# Patient Record
Sex: MALE | Race: WHITE | Employment: OTHER | URBAN - METROPOLITAN AREA
[De-identification: names, ages, dates, MRNs, and addresses within clinical notes are randomized per-mention and may not be internally consistent; named-entity substitution may affect disease eponyms.]

---

## 2018-01-19 ENCOUNTER — APPOINTMENT (OUTPATIENT)
Dept: GENERAL RADIOLOGY | Facility: HOSPITAL | Age: 58
End: 2018-01-19
Attending: EMERGENCY MEDICINE
Payer: MEDICARE

## 2018-01-19 ENCOUNTER — HOSPITAL ENCOUNTER (EMERGENCY)
Facility: HOSPITAL | Age: 58
Discharge: HOME | End: 2018-01-19
Attending: EMERGENCY MEDICINE
Payer: MEDICARE

## 2018-01-19 VITALS
HEIGHT: 70 IN | DIASTOLIC BLOOD PRESSURE: 72 MMHG | OXYGEN SATURATION: 92 % | RESPIRATION RATE: 18 BRPM | SYSTOLIC BLOOD PRESSURE: 117 MMHG | WEIGHT: 200 LBS | BODY MASS INDEX: 28.63 KG/M2 | HEART RATE: 50 BPM

## 2018-01-19 DIAGNOSIS — T42.4X1S: ICD-10-CM

## 2018-01-19 DIAGNOSIS — F41.9 ANXIETY: ICD-10-CM

## 2018-01-19 DIAGNOSIS — R52 WHOLE BODY PAIN: Primary | ICD-10-CM

## 2018-01-19 LAB
ALBUMIN SERPL-MCNC: 4 G/DL (ref 3.5–4.8)
ALP LIVER SERPL-CCNC: 48 U/L (ref 45–117)
ALT SERPL-CCNC: 22 U/L (ref 17–63)
AST SERPL-CCNC: 13 U/L (ref 15–41)
ATRIAL RATE: 55 BPM
BILIRUB SERPL-MCNC: 0.5 MG/DL (ref 0.1–2)
BUN BLD-MCNC: 32 MG/DL (ref 8–20)
CALCIUM BLD-MCNC: 9.2 MG/DL (ref 8.3–10.3)
CHLORIDE: 109 MMOL/L (ref 101–111)
CK: 106 IU/L (ref 39–308)
CO2: 30 MMOL/L (ref 22–32)
CREAT BLD-MCNC: 1.09 MG/DL (ref 0.7–1.3)
GLUCOSE BLD-MCNC: 87 MG/DL (ref 70–99)
M PROTEIN MFR SERPL ELPH: 7.4 G/DL (ref 6.1–8.3)
P AXIS: 50 DEGREES
P-R INTERVAL: 144 MS
POTASSIUM SERPL-SCNC: 4.2 MMOL/L (ref 3.6–5.1)
Q-T INTERVAL: 432 MS
QRS DURATION: 132 MS
QTC CALCULATION (BEZET): 413 MS
R AXIS: 32 DEGREES
SODIUM SERPL-SCNC: 144 MMOL/L (ref 136–144)
T AXIS: 5 DEGREES
TROPONIN: <0.046 NG/ML (ref ?–0.05)
VENTRICULAR RATE: 55 BPM

## 2018-01-19 PROCEDURE — 99285 EMERGENCY DEPT VISIT HI MDM: CPT

## 2018-01-19 PROCEDURE — 84484 ASSAY OF TROPONIN QUANT: CPT | Performed by: EMERGENCY MEDICINE

## 2018-01-19 PROCEDURE — 71046 X-RAY EXAM CHEST 2 VIEWS: CPT | Performed by: EMERGENCY MEDICINE

## 2018-01-19 PROCEDURE — 93010 ELECTROCARDIOGRAM REPORT: CPT

## 2018-01-19 PROCEDURE — 82550 ASSAY OF CK (CPK): CPT | Performed by: EMERGENCY MEDICINE

## 2018-01-19 PROCEDURE — 93005 ELECTROCARDIOGRAM TRACING: CPT

## 2018-01-19 PROCEDURE — 80053 COMPREHEN METABOLIC PANEL: CPT | Performed by: EMERGENCY MEDICINE

## 2018-01-19 PROCEDURE — 36415 COLL VENOUS BLD VENIPUNCTURE: CPT

## 2018-01-19 RX ORDER — ARIPIPRAZOLE 15 MG/1
15 TABLET ORAL DAILY
Status: ON HOLD | COMMUNITY
End: 2018-07-25

## 2018-01-19 RX ORDER — LISINOPRIL 2.5 MG/1
2.5 TABLET ORAL DAILY
Status: ON HOLD | COMMUNITY
End: 2018-07-25

## 2018-01-19 RX ORDER — CLONAZEPAM 2 MG/1
2 TABLET, ORALLY DISINTEGRATING ORAL 3 TIMES DAILY PRN
Status: ON HOLD | COMMUNITY
End: 2018-07-25

## 2018-01-19 NOTE — ED PROVIDER NOTES
Patient Seen in: [facility] Emergency Department    History   Patient presents with:  Dyspnea SALUD SOB (respiratory)    Stated Complaint: SOB, THIS MNG     HPI    Hx of anxiety  Supposed to only take 3 klonopin a day and has been taking [address] [facility] 90.7 kg   SpO2 92%   BMI 28.70 kg/m²         Physical Exam    Patient lying on an emergency department bed flat he has very flat facial affect.   His HEENT exam reveals mildly dry mucosa his neck is supple his lungs are clear to auscultation his heart has a clear.  No focal consolidation. Mediastinal and hilar contours are normal.         =====    CONCLUSION:  No focal consolidation is seen. If clinical symptoms persist     consider followup radiographs or CT.                    Dictated by: Catherine Cranker, Jeremiah Milo declined to give him more benzodiazepines or narcotics. Spoke with patient's primary care physician who wants the patient to be evaluated by psychiatry. I spoke with the patient about this.   He states that if his anxiety could be addressed by a psych

## 2018-01-19 NOTE — ED NOTES
While giving pt discharge instructions - pt states he doesn't feel [facility] is the best way to go. Pt states he will find his own psychiatrist - I recommended that he call the PMD and get a referral through is primary.

## 2018-01-19 NOTE — ED NOTES
MD ordered for pt to go to [facility] for assessment per pt request to receive help for his anxiety. RN called [facility] and was told if pt comes now he will wait a few hours to be seen or can make an appointment to been seen on Monday.   MD ok with pt being seen on Mo

## 2018-01-19 NOTE — ED INITIAL ASSESSMENT (HPI)
Couple hours ago pt started to feel anxious. Pt took extra Clonazepam, states he is suppose to take 3 a day and has been recently taking 6 tabs a day. Afterward started to have heaviness in chest and shortness of breath. Pt called 911.   Currently denies

## 2018-01-22 ENCOUNTER — HOSPITAL (OUTPATIENT)
Dept: OTHER | Age: 58
End: 2018-01-22
Attending: PSYCHIATRY & NEUROLOGY

## 2018-01-22 LAB
ALBUMIN SERPL-MCNC: 4.3 GM/DL (ref 3.6–5.1)
ALBUMIN/GLOB SERPL: 1.3 {RATIO} (ref 1–2.4)
ALP SERPL-CCNC: 39 UNIT/L (ref 45–117)
ALT SERPL-CCNC: 21 UNIT/L
AMPHETAMINES UR QL SCN>500 NG/ML: NEGATIVE
ANALYZER ANC (IANC): NORMAL
ANION GAP SERPL CALC-SCNC: 14 MMOL/L (ref 10–20)
APAP SERPL-MCNC: <2 MCG/ML (ref 10–30)
AST SERPL-CCNC: 24 UNIT/L
BARBITURATES UR QL SCN>200 NG/ML: NEGATIVE
BASOPHILS # BLD: 0.1 THOUSAND/MCL (ref 0–0.3)
BASOPHILS NFR BLD: 1 %
BENZODIAZ UR QL SCN>200 NG/ML: POSITIVE
BILIRUB SERPL-MCNC: 0.5 MG/DL (ref 0.2–1)
BUN SERPL-MCNC: 18 MG/DL (ref 6–20)
BUN/CREAT SERPL: 23 (ref 7–25)
BZE UR QL SCN>150 NG/ML: NEGATIVE
CALCIUM SERPL-MCNC: 9.7 MG/DL (ref 8.4–10.2)
CANNABINOIDS UR QL SCN>50 NG/ML: NEGATIVE
CHLORIDE: 106 MMOL/L (ref 98–107)
CO2 SERPL-SCNC: 26 MMOL/L (ref 21–32)
CREAT SERPL-MCNC: 0.8 MG/DL (ref 0.67–1.17)
DIFFERENTIAL METHOD BLD: NORMAL
EOSINOPHIL # BLD: 0.1 THOUSAND/MCL (ref 0.1–0.5)
EOSINOPHIL NFR BLD: 1 %
ERYTHROCYTE [DISTWIDTH] IN BLOOD: 13.3 % (ref 11–15)
ETHANOL SERPL-MCNC: NORMAL MG/DL
GLOBULIN SER-MCNC: 3.4 GM/DL (ref 2–4)
GLUCOSE SERPL-MCNC: 108 MG/DL (ref 65–99)
HEMATOCRIT: 45.7 % (ref 39–51)
HGB BLD-MCNC: 15.3 GM/DL (ref 13–17)
LIPASE SERPL-CCNC: 126 UNIT/L (ref 73–393)
LYMPHOCYTES # BLD: 3.6 THOUSAND/MCL (ref 1–4)
LYMPHOCYTES NFR BLD: 37 %
MAGNESIUM SERPL-MCNC: 2.1 MG/DL (ref 1.7–2.4)
MCH RBC QN AUTO: 31.4 PG (ref 26–34)
MCHC RBC AUTO-ENTMCNC: 33.5 GM/DL (ref 32–36.5)
MCV RBC AUTO: 93.8 FL (ref 78–100)
MONOCYTES # BLD: 0.7 THOUSAND/MCL (ref 0.3–0.9)
MONOCYTES NFR BLD: 7 %
NEUTROPHILS # BLD: 5.3 THOUSAND/MCL (ref 1.8–7.7)
NEUTROPHILS NFR BLD: 54 %
NEUTS SEG NFR BLD: NORMAL %
OPIATES UR QL SCN>300 NG/ML: NEGATIVE
PCP UR QL SCN>25 NG/ML: NEGATIVE
PERCENT NRBC: NORMAL
PLATELET # BLD: 222 THOUSAND/MCL (ref 140–450)
POTASSIUM SERPL-SCNC: 3.8 MMOL/L (ref 3.4–5.1)
PROT SERPL-MCNC: 7.7 GM/DL (ref 6.4–8.2)
RBC # BLD: 4.87 MILLION/MCL (ref 4.5–5.9)
SALICYLATES SERPL-MCNC: <2.8 MG/DL
SODIUM SERPL-SCNC: 142 MMOL/L (ref 135–145)
WBC # BLD: 9.8 THOUSAND/MCL (ref 4.2–11)

## 2018-01-23 LAB
AMORPH SED URNS QL MICRO: ABNORMAL
APPEARANCE UR: CLEAR
BILIRUB UR QL: NEGATIVE
CAOX CRY URNS QL MICRO: ABNORMAL
CHOLEST SERPL-MCNC: 155 MG/DL
CHOLEST/HDLC SERPL: 3.9 {RATIO}
COLOR UR: YELLOW
EPITH CASTS #/AREA URNS LPF: ABNORMAL /[LPF]
FATTY CASTS #/AREA URNS LPF: ABNORMAL /[LPF]
GLUCOSE UR-MCNC: NEGATIVE MG/DL
GLYCOHEMOGLOBIN: 5.4 % (ref 4.5–5.6)
GRAN CASTS #/AREA URNS LPF: ABNORMAL /[LPF]
HDLC SERPL-MCNC: 40 MG/DL
HGB UR QL: NEGATIVE
HYALINE CASTS #/AREA URNS LPF: ABNORMAL /[LPF]
KETONES UR-MCNC: NEGATIVE MG/DL
LDLC SERPL CALC-MCNC: 98 MG/DL
LEUKOCYTE ESTERASE UR QL STRIP: NEGATIVE
MICROSCOPIC (MT): ABNORMAL
MIXED CELL CASTS #/AREA URNS LPF: ABNORMAL /[LPF]
MUCOUS THREADS URNS QL MICRO: ABNORMAL
NITRITE UR QL: NEGATIVE
NONHDLC SERPL-MCNC: 115 MG/DL
PH UR: 6 UNIT (ref 5–7)
PROT UR QL: NEGATIVE MG/DL
RBC CASTS #/AREA URNS LPF: ABNORMAL /[LPF]
RENAL EPI CELLS #/AREA URNS HPF: ABNORMAL /[HPF]
SP GR UR: 1.02 (ref 1–1.03)
SPECIMEN SOURCE: ABNORMAL
SPERM URNS QL MICRO: ABNORMAL
T VAGINALIS URNS QL MICRO: ABNORMAL
T3 SERPL-MCNC: 1.01 NG/ML (ref 0.6–1.81)
T4 SERPL-MCNC: 10.9 MCG/DL (ref 4.7–13.3)
TRI-PHOS CRY URNS QL MICRO: ABNORMAL
TRIGLYCERIDE (TRIGP): 84 MG/DL
TSH SERPL-ACNC: 1.4 MCUNIT/ML (ref 0.35–5)
URATE CRY URNS QL MICRO: ABNORMAL
URNS CMNT MICRO: ABNORMAL
UROBILINOGEN UR QL: 4 MG/DL (ref 0–1)
WAXY CASTS #/AREA URNS LPF: ABNORMAL /[LPF]
WBC CASTS #/AREA URNS LPF: ABNORMAL /[LPF]
YEAST HYPHAE URNS QL MICRO: ABNORMAL
YEAST URNS QL MICRO: ABNORMAL

## 2018-01-29 ENCOUNTER — DIAGNOSTIC TRANS (OUTPATIENT)
Dept: OTHER | Age: 58
End: 2018-01-29

## 2018-01-31 LAB
ALBUMIN SERPL-MCNC: 4 GM/DL (ref 3.6–5.1)
ALBUMIN/GLOB SERPL: 1.2 {RATIO} (ref 1–2.4)
ALP SERPL-CCNC: 41 UNIT/L (ref 45–117)
ALT SERPL-CCNC: 15 UNIT/L
ANALYZER ANC (IANC): NORMAL
ANION GAP SERPL CALC-SCNC: 12 MMOL/L (ref 10–20)
AST SERPL-CCNC: 13 UNIT/L
BASOPHILS # BLD: 0 THOUSAND/MCL (ref 0–0.3)
BASOPHILS NFR BLD: 0 %
BILIRUB SERPL-MCNC: 0.5 MG/DL (ref 0.2–1)
BUN SERPL-MCNC: 19 MG/DL (ref 6–20)
BUN/CREAT SERPL: 22 (ref 7–25)
CALCIUM SERPL-MCNC: 9.3 MG/DL (ref 8.4–10.2)
CHLORIDE: 103 MMOL/L (ref 98–107)
CO2 SERPL-SCNC: 32 MMOL/L (ref 21–32)
CREAT SERPL-MCNC: 0.85 MG/DL (ref 0.67–1.17)
DIFFERENTIAL METHOD BLD: NORMAL
EOSINOPHIL # BLD: 0.2 THOUSAND/MCL (ref 0.1–0.5)
EOSINOPHIL NFR BLD: 4 %
ERYTHROCYTE [DISTWIDTH] IN BLOOD: 13.2 % (ref 11–15)
GLOBULIN SER-MCNC: 3.3 GM/DL (ref 2–4)
GLUCOSE SERPL-MCNC: 94 MG/DL (ref 65–99)
HEMATOCRIT: 44.5 % (ref 39–51)
HGB BLD-MCNC: 15 GM/DL (ref 13–17)
LYMPHOCYTES # BLD: 2 THOUSAND/MCL (ref 1–4)
LYMPHOCYTES NFR BLD: 44 %
MCH RBC QN AUTO: 31.6 PG (ref 26–34)
MCHC RBC AUTO-ENTMCNC: 33.7 GM/DL (ref 32–36.5)
MCV RBC AUTO: 93.9 FL (ref 78–100)
MONOCYTES # BLD: 0.5 THOUSAND/MCL (ref 0.3–0.9)
MONOCYTES NFR BLD: 11 %
NEUTROPHILS # BLD: 1.8 THOUSAND/MCL (ref 1.8–7.7)
NEUTROPHILS NFR BLD: 41 %
NEUTS SEG NFR BLD: NORMAL %
PERCENT NRBC: NORMAL
PLATELET # BLD: 192 THOUSAND/MCL (ref 140–450)
POTASSIUM SERPL-SCNC: 4.1 MMOL/L (ref 3.4–5.1)
PROT SERPL-MCNC: 7.3 GM/DL (ref 6.4–8.2)
RBC # BLD: 4.74 MILLION/MCL (ref 4.5–5.9)
SODIUM SERPL-SCNC: 143 MMOL/L (ref 135–145)
WBC # BLD: 4.5 THOUSAND/MCL (ref 4.2–11)

## 2018-03-03 ENCOUNTER — HOSPITAL ENCOUNTER (EMERGENCY)
Facility: HOSPITAL | Age: 58
Discharge: HOME | End: 2018-03-03
Payer: MEDICARE

## 2018-03-03 ENCOUNTER — APPOINTMENT (OUTPATIENT)
Dept: CT IMAGING | Facility: HOSPITAL | Age: 58
End: 2018-03-03
Payer: MEDICARE

## 2018-03-03 VITALS
WEIGHT: 195 LBS | OXYGEN SATURATION: 98 % | HEART RATE: 70 BPM | RESPIRATION RATE: 16 BRPM | DIASTOLIC BLOOD PRESSURE: 76 MMHG | BODY MASS INDEX: 27.92 KG/M2 | SYSTOLIC BLOOD PRESSURE: 102 MMHG | HEIGHT: 70 IN | TEMPERATURE: 97 F

## 2018-03-03 DIAGNOSIS — R10.9 ABDOMINAL PAIN OF UNKNOWN ETIOLOGY: Primary | ICD-10-CM

## 2018-03-03 DIAGNOSIS — R30.0 DYSURIA: ICD-10-CM

## 2018-03-03 LAB
ALBUMIN SERPL-MCNC: 4 G/DL (ref 3.5–4.8)
ALP LIVER SERPL-CCNC: 41 U/L (ref 45–117)
ALT SERPL-CCNC: 17 U/L (ref 17–63)
AST SERPL-CCNC: 24 U/L (ref 15–41)
BASOPHILS # BLD AUTO: 0.03 X10(3) UL (ref 0–0.1)
BASOPHILS NFR BLD AUTO: 0.6 %
BILIRUB SERPL-MCNC: 0.6 MG/DL (ref 0.1–2)
BUN BLD-MCNC: 20 MG/DL (ref 8–20)
CALCIUM BLD-MCNC: 9.7 MG/DL (ref 8.3–10.3)
CHLORIDE: 105 MMOL/L (ref 101–111)
CO2: 29 MMOL/L (ref 22–32)
CREAT BLD-MCNC: 0.91 MG/DL (ref 0.7–1.3)
EOSINOPHIL # BLD AUTO: 0.38 X10(3) UL (ref 0–0.3)
EOSINOPHIL NFR BLD AUTO: 7.2 %
ERYTHROCYTE [DISTWIDTH] IN BLOOD BY AUTOMATED COUNT: 13.1 % (ref 11.5–16)
GLUCOSE BLD-MCNC: 97 MG/DL (ref 70–99)
GLUCOSE UR STRIP.AUTO-MCNC: NEGATIVE MG/DL
HCT VFR BLD AUTO: 43.8 % (ref 37–53)
HGB BLD-MCNC: 14.5 G/DL (ref 13–17)
IMMATURE GRANULOCYTE COUNT: 0.01 X10(3) UL (ref 0–1)
IMMATURE GRANULOCYTE RATIO %: 0.2 %
LEUKOCYTE ESTERASE UR QL STRIP.AUTO: NEGATIVE
LYMPHOCYTES # BLD AUTO: 2.41 X10(3) UL (ref 0.9–4)
LYMPHOCYTES NFR BLD AUTO: 45.9 %
M PROTEIN MFR SERPL ELPH: 7 G/DL (ref 6.1–8.3)
MCH RBC QN AUTO: 30.9 PG (ref 27–33.2)
MCHC RBC AUTO-ENTMCNC: 33.1 G/DL (ref 31–37)
MCV RBC AUTO: 93.4 FL (ref 80–99)
MONOCYTES # BLD AUTO: 0.42 X10(3) UL (ref 0.1–1)
MONOCYTES NFR BLD AUTO: 8 %
NEUTROPHIL ABS PRELIM: 2 X10 (3) UL (ref 1.3–6.7)
NEUTROPHILS # BLD AUTO: 2 X10(3) UL (ref 1.3–6.7)
NEUTROPHILS NFR BLD AUTO: 38.1 %
NITRITE UR QL STRIP.AUTO: NEGATIVE
PH UR STRIP.AUTO: 5 [PH] (ref 4.5–8)
PLATELET # BLD AUTO: 170 10(3)UL (ref 150–450)
POTASSIUM SERPL-SCNC: 4.7 MMOL/L (ref 3.6–5.1)
PROT UR STRIP.AUTO-MCNC: 100 MG/DL
RBC # BLD AUTO: 4.69 X10(6)UL (ref 4.3–5.7)
RBC #/AREA URNS AUTO: >10 /HPF
RED CELL DISTRIBUTION WIDTH-SD: 44.2 FL (ref 35.1–46.3)
SODIUM SERPL-SCNC: 141 MMOL/L (ref 136–144)
SP GR UR STRIP.AUTO: 1.04 (ref 1–1.03)
UROBILINOGEN UR STRIP.AUTO-MCNC: 2 MG/DL
WBC # BLD AUTO: 5.3 X10(3) UL (ref 4–13)

## 2018-03-03 PROCEDURE — 87086 URINE CULTURE/COLONY COUNT: CPT

## 2018-03-03 PROCEDURE — 81001 URINALYSIS AUTO W/SCOPE: CPT

## 2018-03-03 PROCEDURE — 80053 COMPREHEN METABOLIC PANEL: CPT

## 2018-03-03 PROCEDURE — 74176 CT ABD & PELVIS W/O CONTRAST: CPT

## 2018-03-03 PROCEDURE — 85025 COMPLETE CBC W/AUTO DIFF WBC: CPT

## 2018-03-03 PROCEDURE — 99284 EMERGENCY DEPT VISIT MOD MDM: CPT

## 2018-03-03 PROCEDURE — 96361 HYDRATE IV INFUSION ADD-ON: CPT

## 2018-03-03 PROCEDURE — 96360 HYDRATION IV INFUSION INIT: CPT

## 2018-03-03 PROCEDURE — 51701 INSERT BLADDER CATHETER: CPT

## 2018-03-03 RX ORDER — TAMSULOSIN HYDROCHLORIDE 0.4 MG/1
0.4 CAPSULE ORAL DAILY
Qty: 7 CAPSULE | Refills: 0 | Status: SHIPPED | OUTPATIENT
Start: 2018-03-03 | End: 2018-03-10

## 2018-03-03 RX ORDER — SODIUM CHLORIDE 9 MG/ML
INJECTION, SOLUTION INTRAVENOUS CONTINUOUS
Status: DISCONTINUED | OUTPATIENT
Start: 2018-03-03 | End: 2018-03-03

## 2018-03-03 RX ORDER — SODIUM CHLORIDE 9 MG/ML
1000 INJECTION, SOLUTION INTRAVENOUS ONCE
Status: COMPLETED | OUTPATIENT
Start: 2018-03-03 | End: 2018-03-03

## 2018-03-03 RX ORDER — LIDOCAINE HYDROCHLORIDE 20 MG/ML
10 JELLY TOPICAL ONCE
Status: COMPLETED | OUTPATIENT
Start: 2018-03-03 | End: 2018-03-03

## 2018-03-03 NOTE — ED NOTES
Pt with suprapubic tenderness and lower abdominal distention. Pt straight cathed under aseptic technique. 30 ml of carmenza urine drained. Pt reports feeling worse with straight cath. Reports urge to urinate. Urinal provided.

## 2018-03-03 NOTE — ED PROVIDER NOTES
Patient Seen in: [facility] Emergency Department    History   Patient presents with:  Eval-G (gynecologic)    Stated Complaint: eval g    HPI    Pleasant 66-year-old male who presents to this emergency department in the middle the night with a compl Normocephalic and atraumatic. Sclera anicteric, conjunctiva pink and moist.    Mucus membranes pink and moist,   Neck: Supple with normal range of motion.    Chest: clear breath sounds      Abdomen: Soft, nondistended,  No tenderness, benign abdominal e intra-abdominal or intra-pelvic abnormality.    -No evidence of an obstructive uropathy. Small nonobstructing left renal calyceal stones.   Urinary bladder and prostate gland demonstrate a normal noncontrast appearance.    -Unremarkable appendix, and small and prescription treatment options and Physician follow up plan was discussed. The patient is discharged home in good condition.              Disposition and Plan     Clinical Impression:  Abdominal pain of unknown etiology  (primary encounter diagnosis)

## 2018-03-03 NOTE — ED INITIAL ASSESSMENT (HPI)
Patient has been treating for the past month for a swollen prostate. Patient sts unable to urinate since this afternoon. Pain in abd.  Denies n/v.

## 2018-07-25 ENCOUNTER — APPOINTMENT (OUTPATIENT)
Dept: GENERAL RADIOLOGY | Facility: HOSPITAL | Age: 58
DRG: 199 | End: 2018-07-25
Attending: EMERGENCY MEDICINE
Payer: MEDICARE

## 2018-07-25 ENCOUNTER — APPOINTMENT (OUTPATIENT)
Dept: GENERAL RADIOLOGY | Facility: HOSPITAL | Age: 58
DRG: 199 | End: 2018-07-25
Attending: INTERNAL MEDICINE
Payer: MEDICARE

## 2018-07-25 ENCOUNTER — HOSPITAL ENCOUNTER (INPATIENT)
Facility: HOSPITAL | Age: 58
LOS: 6 days | Discharge: HOME | DRG: 199 | End: 2018-07-31
Attending: EMERGENCY MEDICINE | Admitting: SPECIALIST
Payer: MEDICARE

## 2018-07-25 DIAGNOSIS — S27.0XXA TRAUMATIC PNEUMOTHORAX, INITIAL ENCOUNTER: Primary | ICD-10-CM

## 2018-07-25 LAB
ALBUMIN SERPL-MCNC: 3.9 G/DL (ref 3.5–4.8)
ALBUMIN/GLOB SERPL: 1.3 {RATIO} (ref 1–2)
ALP LIVER SERPL-CCNC: 53 U/L (ref 45–117)
ALT SERPL-CCNC: 35 U/L (ref 17–63)
AMPHET UR QL SCN: NEGATIVE
ANION GAP SERPL CALC-SCNC: 7 MMOL/L (ref 0–18)
AST SERPL-CCNC: 34 U/L (ref 15–41)
BARBITURATES UR QL SCN: NEGATIVE
BASOPHILS # BLD AUTO: 0.03 X10(3) UL (ref 0–0.1)
BASOPHILS NFR BLD AUTO: 0.3 %
BILIRUB SERPL-MCNC: 0.4 MG/DL (ref 0.1–2)
BUN BLD-MCNC: 28 MG/DL (ref 8–20)
BUN/CREAT SERPL: 32.6 (ref 10–20)
CALCIUM BLD-MCNC: 8.9 MG/DL (ref 8.3–10.3)
CANNABINOIDS UR QL SCN: NEGATIVE
CHLORIDE SERPL-SCNC: 105 MMOL/L (ref 101–111)
CO2 SERPL-SCNC: 29 MMOL/L (ref 22–32)
COCAINE UR QL: NEGATIVE
CREAT BLD-MCNC: 0.86 MG/DL (ref 0.7–1.3)
EOSINOPHIL # BLD AUTO: 0.18 X10(3) UL (ref 0–0.3)
EOSINOPHIL NFR BLD AUTO: 1.8 %
ERYTHROCYTE [DISTWIDTH] IN BLOOD BY AUTOMATED COUNT: 13.3 % (ref 11.5–16)
ETHANOL UR-MCNC: NEGATIVE MG/DL
ETHYL ALCOHOL: <3 MG/DL (ref ?–3)
GLOBULIN PLAS-MCNC: 3 G/DL (ref 2.5–3.7)
GLUCOSE BLD-MCNC: 99 MG/DL (ref 70–99)
HCT VFR BLD AUTO: 42.2 % (ref 37–53)
HGB BLD-MCNC: 14.2 G/DL (ref 13–17)
IMMATURE GRANULOCYTE COUNT: 0.04 X10(3) UL (ref 0–1)
IMMATURE GRANULOCYTE RATIO %: 0.4 %
LYMPHOCYTES # BLD AUTO: 1.76 X10(3) UL (ref 0.9–4)
LYMPHOCYTES NFR BLD AUTO: 17.9 %
M PROTEIN MFR SERPL ELPH: 6.9 G/DL (ref 6.1–8.3)
MCH RBC QN AUTO: 31.1 PG (ref 27–33.2)
MCHC RBC AUTO-ENTMCNC: 33.6 G/DL (ref 31–37)
MCV RBC AUTO: 92.5 FL (ref 80–99)
MONOCYTES # BLD AUTO: 0.77 X10(3) UL (ref 0.1–1)
MONOCYTES NFR BLD AUTO: 7.8 %
NEUTROPHIL ABS PRELIM: 7.03 X10 (3) UL (ref 1.3–6.7)
NEUTROPHILS # BLD AUTO: 7.03 X10(3) UL (ref 1.3–6.7)
NEUTROPHILS NFR BLD AUTO: 71.8 %
OSMOLALITY SERPL CALC.SUM OF ELEC: 298 MOSM/KG (ref 275–295)
PCP URINE: NEGATIVE
PLATELET # BLD AUTO: 180 10(3)UL (ref 150–450)
POTASSIUM SERPL-SCNC: 4.2 MMOL/L (ref 3.6–5.1)
RBC # BLD AUTO: 4.56 X10(6)UL (ref 4.3–5.7)
RED CELL DISTRIBUTION WIDTH-SD: 45.5 FL (ref 35.1–46.3)
SODIUM SERPL-SCNC: 141 MMOL/L (ref 136–144)
WBC # BLD AUTO: 9.8 X10(3) UL (ref 4–13)

## 2018-07-25 PROCEDURE — 71045 X-RAY EXAM CHEST 1 VIEW: CPT | Performed by: EMERGENCY MEDICINE

## 2018-07-25 PROCEDURE — 71045 X-RAY EXAM CHEST 1 VIEW: CPT | Performed by: INTERNAL MEDICINE

## 2018-07-25 PROCEDURE — 71101 X-RAY EXAM UNILAT RIBS/CHEST: CPT | Performed by: EMERGENCY MEDICINE

## 2018-07-25 PROCEDURE — 99223 1ST HOSP IP/OBS HIGH 75: CPT | Performed by: SURGERY

## 2018-07-25 PROCEDURE — 0W9930Z DRAINAGE OF RIGHT PLEURAL CAVITY WITH DRAINAGE DEVICE, PERCUTANEOUS APPROACH: ICD-10-PCS | Performed by: EMERGENCY MEDICINE

## 2018-07-25 RX ORDER — HYDROCODONE BITARTRATE AND ACETAMINOPHEN 10; 325 MG/1; MG/1
1 TABLET ORAL EVERY 6 HOURS PRN
COMMUNITY
End: 2020-06-20

## 2018-07-25 RX ORDER — DULOXETIN HYDROCHLORIDE 30 MG/1
60 CAPSULE, DELAYED RELEASE ORAL 2 TIMES DAILY
Status: DISCONTINUED | OUTPATIENT
Start: 2018-07-25 | End: 2018-07-31

## 2018-07-25 RX ORDER — MORPHINE SULFATE 4 MG/ML
INJECTION, SOLUTION INTRAMUSCULAR; INTRAVENOUS
Status: COMPLETED
Start: 2018-07-25 | End: 2018-07-25

## 2018-07-25 RX ORDER — SODIUM CHLORIDE 9 MG/ML
INJECTION, SOLUTION INTRAVENOUS CONTINUOUS
Status: DISCONTINUED | OUTPATIENT
Start: 2018-07-25 | End: 2018-07-29

## 2018-07-25 RX ORDER — AMITRIPTYLINE HYDROCHLORIDE 25 MG/1
25 TABLET, FILM COATED ORAL DAILY
COMMUNITY

## 2018-07-25 RX ORDER — DULOXETIN HYDROCHLORIDE 60 MG/1
60 CAPSULE, DELAYED RELEASE ORAL 2 TIMES DAILY
COMMUNITY

## 2018-07-25 RX ORDER — HYDROCODONE BITARTRATE AND ACETAMINOPHEN 5; 325 MG/1; MG/1
1 TABLET ORAL EVERY 6 HOURS PRN
Status: DISCONTINUED | OUTPATIENT
Start: 2018-07-25 | End: 2018-07-28

## 2018-07-25 RX ORDER — ARIPIPRAZOLE 5 MG/1
15 TABLET ORAL DAILY
Status: DISCONTINUED | OUTPATIENT
Start: 2018-07-25 | End: 2018-07-25

## 2018-07-25 RX ORDER — CLONAZEPAM 2 MG/1
2 TABLET ORAL 3 TIMES DAILY
Status: ON HOLD | COMMUNITY
End: 2018-07-30

## 2018-07-25 RX ORDER — ONDANSETRON 2 MG/ML
INJECTION INTRAMUSCULAR; INTRAVENOUS
Status: COMPLETED
Start: 2018-07-25 | End: 2018-07-25

## 2018-07-25 RX ORDER — AMITRIPTYLINE HYDROCHLORIDE 25 MG/1
25 TABLET, FILM COATED ORAL DAILY
Status: DISCONTINUED | OUTPATIENT
Start: 2018-07-25 | End: 2018-07-31

## 2018-07-25 RX ORDER — CLONAZEPAM 1 MG/1
2 TABLET ORAL 3 TIMES DAILY PRN
Status: DISCONTINUED | OUTPATIENT
Start: 2018-07-25 | End: 2018-07-25

## 2018-07-25 RX ORDER — CLONAZEPAM 0.5 MG/1
2 TABLET ORAL 3 TIMES DAILY
Status: DISCONTINUED | OUTPATIENT
Start: 2018-07-25 | End: 2018-07-27

## 2018-07-25 RX ORDER — IBUPROFEN 200 MG
400 TABLET ORAL NIGHTLY PRN
Status: ON HOLD | COMMUNITY
End: 2018-07-26

## 2018-07-25 RX ORDER — LISINOPRIL 2.5 MG/1
2.5 TABLET ORAL DAILY
Status: DISCONTINUED | OUTPATIENT
Start: 2018-07-25 | End: 2018-07-25

## 2018-07-25 RX ORDER — FENOFIBRATE 134 MG/1
134 CAPSULE ORAL
Status: DISCONTINUED | OUTPATIENT
Start: 2018-07-25 | End: 2018-07-31

## 2018-07-25 RX ORDER — FENOFIBRATE 160 MG/1
160 TABLET ORAL DAILY
COMMUNITY

## 2018-07-25 RX ORDER — MORPHINE SULFATE 4 MG/ML
4 INJECTION, SOLUTION INTRAMUSCULAR; INTRAVENOUS ONCE
Status: COMPLETED | OUTPATIENT
Start: 2018-07-25 | End: 2018-07-25

## 2018-07-25 RX ORDER — ENOXAPARIN SODIUM 100 MG/ML
40 INJECTION SUBCUTANEOUS DAILY
Status: DISCONTINUED | OUTPATIENT
Start: 2018-07-25 | End: 2018-07-31

## 2018-07-25 RX ORDER — PHENOL 1.4 %
10 AEROSOL, SPRAY (ML) MUCOUS MEMBRANE EVERY EVENING
COMMUNITY
End: 2020-06-20

## 2018-07-25 NOTE — ED PROVIDER NOTES
Patient Seen in: [facility] Emergency Department    History   Patient presents with:  Trauma (cardiovascular, musculoskeletal)    Stated Complaint: Jojo REINOSO    The patient is a 43-year-old male presenting to the [place] [address] %  O2 Device: None (Room air)    Current:/76   Pulse 77   Temp 97.5 °F (36.4 °C) (Temporal)   Resp 18   Ht 177.8 cm (5' 10\")   Wt 95.3 kg   SpO2 99%   BMI 30.13 kg/m²         Physical Exam  General: Well-appearing, nontoxic adult male who is convers ED Course     Labs Reviewed   COMP METABOLIC PANEL (14) - Abnormal; Notable for the following:        Result Value    BUN 28 (*)     BUN/CREA Ratio 32.6 (*)     Calculated Osmolality 298 (*)     All other components within normal limits   CBC W/ DIFFER was sterilized with chlorhexidine x 5 swabs and a sterile gown, sterile gloves, sterile drape were used in sterile fashion.   A linear incision was created with an #11 scalpel, blunt dissection was used to violate the pleural cavity immediately above the fi
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
## 2018-07-25 NOTE — CONSULTS
Pulmonary H&P/Consult       NAME: Wolf Bolden - ROOM: 9235/6598-E - MRN: [id number] - Age: 62year old - :  1960    Date of Admission: 2018  3:44 AM  Admission Diagnosis: Traumatic pneumothorax, initial encounter [S27. 0XXA]  Reason for [employer] Disp:  Rfl:  7/24/2018 at Unknown time   HYDROcodone-acetaminophen  MG Oral Tab Take 1 tablet by mouth every 6 (six) hours as needed for Pain. Disp:  Rfl:  Past Month at Unknown time   Amitriptyline HCl 25 MG Oral Tab Take 25 mg by mouth daily.  Disp: denies blurred vision or double vision   HEENT:  denies nasal congestion, sinus pain/tenderness  RESPIRATORY:  denies SOB, cough, dyspnea   CARDIOVASCULAR: no h/o arhythmia    GI:  denies abdominal pain  :  denies dysuria or changes in stream   MUSCULOSK in place + air leak   Heart:    Regular rate and rhythm, S1 and S2 normal, no murmur, rub   or gallop   Abdomen:     Soft, non-tender, bowel sounds active all four quadrants,     no masses, no organomegaly   Extremities:   Extremities normal, atraumatic, n

## 2018-07-25 NOTE — PROGRESS NOTES
Assumed care of patient at 0730  AOx4, 2LNC, NSR on tele  Reports controlled pain currently  Right side chest tube in place to wall suction, dressing C/D/I  Up with 1 assist  Admission database done, PTA meds reviewed with patient, patient not quite sure o
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
## 2018-07-25 NOTE — PROGRESS NOTES
Found patient sitting on side of bed, on his own. Has removed his gown, using urinal. Part of chest tube dressing off- patient did not call for assistance. Dr Marlene Aranda at bedside. CT dressing reapplied/reinforced.  Patient answering questions appropriatel

## 2018-07-25 NOTE — ED NOTES
Pt sitting uprt on cart, voiced feeling well, pt given telephone to talk to family. Pt updated with ED process awaiting transfer to floor.
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
## 2018-07-26 ENCOUNTER — APPOINTMENT (OUTPATIENT)
Dept: GENERAL RADIOLOGY | Facility: HOSPITAL | Age: 58
DRG: 199 | End: 2018-07-26
Attending: INTERNAL MEDICINE
Payer: MEDICARE

## 2018-07-26 PROCEDURE — 99232 SBSQ HOSP IP/OBS MODERATE 35: CPT | Performed by: SURGERY

## 2018-07-26 PROCEDURE — 71045 X-RAY EXAM CHEST 1 VIEW: CPT | Performed by: INTERNAL MEDICINE

## 2018-07-26 RX ORDER — ALFUZOSIN HYDROCHLORIDE 10 MG/1
10 TABLET, EXTENDED RELEASE ORAL
Status: DISCONTINUED | OUTPATIENT
Start: 2018-07-26 | End: 2018-07-31

## 2018-07-26 RX ORDER — ALFUZOSIN HYDROCHLORIDE 10 MG/1
10 TABLET, EXTENDED RELEASE ORAL
Status: DISCONTINUED | OUTPATIENT
Start: 2018-07-26 | End: 2018-07-26

## 2018-07-26 NOTE — PROGRESS NOTES
[facility]  Progress Note    Jacklyn Alfaro Patient Status:  Inpatient    1960 MRN [id number]   [facility] 7NE-A Attending Sahra Mckinney MD   Hosp Day # 1 PCP Gabriela Mijares MD         SUBJECTIVE:  Subjective:  Jacklyn Alfaro is chloride 0.9%  1,000 mL Intravenous Once   • enoxaparin  40 mg Subcutaneous Daily   • Amitriptyline HCl  25 mg Oral Daily   • ClonazePAM  2 mg Oral TID   • DULoxetine HCl  60 mg Oral BID   • fenofibrate micronized  134 mg Oral Daily with breakfast   • adán

## 2018-07-26 NOTE — PROGRESS NOTES
[facility]  Progress Note    Carmen Ying Patient Status:  Inpatient    1960 MRN [id number]   [facility] 7NE-A Attending Sheng Weiss MD   Hosp Day # 1 PCP Marta Araujo MD     Subjective:  Feels ok but sore.   No nausea or

## 2018-07-26 NOTE — PLAN OF CARE
Assumed care at 1100. Pt A&O x4. On 2L O2 via nc. Rt lung sounds diminished. CXR completed- see results. Rt chest tube to water seal- scant amounts of sanguineous drainage noted. NSR/ST on tele.  Pt reports pain to Rt chest and shoulder, states he generally

## 2018-07-26 NOTE — PLAN OF CARE
Assumed care at 1930  AOx4, VSS on 2L  NSR per tele  CT in place, Dressing C/D/I. IVF infusing. C/o of urinary urgency and unable to void. Bladder scan 646mL, Refusing straight cath. Able to void 200cc. Dr. Michale Lewis notified. Uroxatral ordered.     Via Contreras Castillo
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
## 2018-07-26 NOTE — CM/SW NOTE
07/26/18 1000   CM/SW Screening   Referral Source Social Work (self-referral)   [address] staff; Chart review;Nursing rounds     Patient was screened during rounds and no needs are identified at this time.   RN to contact SW/CM if needs arise

## 2018-07-27 ENCOUNTER — APPOINTMENT (OUTPATIENT)
Dept: CT IMAGING | Facility: HOSPITAL | Age: 58
DRG: 199 | End: 2018-07-27
Attending: INTERNAL MEDICINE
Payer: MEDICARE

## 2018-07-27 ENCOUNTER — APPOINTMENT (OUTPATIENT)
Dept: GENERAL RADIOLOGY | Facility: HOSPITAL | Age: 58
DRG: 199 | End: 2018-07-27
Attending: INTERNAL MEDICINE
Payer: MEDICARE

## 2018-07-27 PROCEDURE — 71045 X-RAY EXAM CHEST 1 VIEW: CPT | Performed by: INTERNAL MEDICINE

## 2018-07-27 PROCEDURE — 0W9930Z DRAINAGE OF RIGHT PLEURAL CAVITY WITH DRAINAGE DEVICE, PERCUTANEOUS APPROACH: ICD-10-PCS | Performed by: INTERNAL MEDICINE

## 2018-07-27 PROCEDURE — 70450 CT HEAD/BRAIN W/O DYE: CPT | Performed by: INTERNAL MEDICINE

## 2018-07-27 PROCEDURE — 99232 SBSQ HOSP IP/OBS MODERATE 35: CPT | Performed by: SURGERY

## 2018-07-27 RX ORDER — LIDOCAINE HYDROCHLORIDE 10 MG/ML
INJECTION, SOLUTION EPIDURAL; INFILTRATION; INTRACAUDAL; PERINEURAL
Status: DISCONTINUED
Start: 2018-07-27 | End: 2018-07-27 | Stop reason: WASHOUT

## 2018-07-27 RX ORDER — CLONAZEPAM 0.5 MG/1
1 TABLET ORAL 3 TIMES DAILY
Status: DISCONTINUED | OUTPATIENT
Start: 2018-07-27 | End: 2018-07-31

## 2018-07-27 RX ORDER — MORPHINE SULFATE 4 MG/ML
2 INJECTION, SOLUTION INTRAMUSCULAR; INTRAVENOUS ONCE
Status: COMPLETED | OUTPATIENT
Start: 2018-07-27 | End: 2018-07-27

## 2018-07-27 NOTE — PROGRESS NOTES
[facility]  Progress Note    Matthew Wood Patient Status:  Inpatient    1960 MRN [id number]   [facility] 7NE-A Attending Bruno Aragon MD   Hosp Day # 2 PCP Shankar Muñiz MD         SUBJECTIVE:  Subjective:  Matthew Wood is enoxaparin  40 mg Subcutaneous Daily   • Amitriptyline HCl  25 mg Oral Daily   • DULoxetine HCl  60 mg Oral BID   • fenofibrate micronized  134 mg Oral Daily with breakfast   • melatonin  10 mg Oral QPM     • sodium chloride 75 mL/hr at 07/27/18 0737     H

## 2018-07-27 NOTE — PROGRESS NOTES
[facility]  Progress Note    [facility] Patient Status:  Inpatient    1960 MRN [id number]   [facility] 7NE-A Attending Isai Deng MD   Hosp Day # 2 PCP Ai Caba MD     Subjective:  Sitting up, chest tube just remov

## 2018-07-27 NOTE — PROGRESS NOTES
[address] Patient Status:  Inpatient    1960 MRN [id number]   [facility] 7NE-A Attending Jeremias Major MD   Hosp Day # 2 PCP Jakub Lynn MD     SUBJECTIVE: No acute events overnight.   Pt complains of right non-tender, non-distended, positive BS.                         Extremity: No clubbing or cyanosis.  no edema                          Skin: No rashes or lesions        No results found for: PT, INR       Imaging: I have independently visualized all releva

## 2018-07-27 NOTE — PLAN OF CARE
Assumed care at 0730  A&Ox4, VSS, NSR on tele  Dr. Melina Lopez notified of critical CXR results. CT placed to suction as instructed  1530 - CT removed by Dr. Raphael Ochoa. Vaseline gauze and pressure dressing applied. Repeat CXR ordered for 1630.  Abigail JEFFRIES  C/O ri

## 2018-07-27 NOTE — PLAN OF CARE
Assumed care of pt at [address]. Pt A/Ox4. VSS. C/o of pain to right shoulder and chest and ribs 5/10, norco given. On 2L of O2 via NC. Rt lung sounds diminished. Right chest tube to water seal- scant amount of sanguineous drainage noted. NSR/ST on tele.  Voidin

## 2018-07-28 ENCOUNTER — APPOINTMENT (OUTPATIENT)
Dept: GENERAL RADIOLOGY | Facility: HOSPITAL | Age: 58
DRG: 199 | End: 2018-07-28
Attending: INTERNAL MEDICINE
Payer: MEDICARE

## 2018-07-28 PROCEDURE — 71045 X-RAY EXAM CHEST 1 VIEW: CPT | Performed by: INTERNAL MEDICINE

## 2018-07-28 RX ORDER — HYDROCODONE BITARTRATE AND ACETAMINOPHEN 5; 325 MG/1; MG/1
1 TABLET ORAL EVERY 4 HOURS PRN
Status: DISCONTINUED | OUTPATIENT
Start: 2018-07-28 | End: 2018-07-31
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
## 2018-07-28 NOTE — PROCEDURES
[address] Patient Status:  Inpatient    1960 MRN [id number]   [facility] 7NE-A Attending Daniel Jacobs MD   Hosp Day # 2 PCP Lisa Sabillon MD     Chest Tube Insertion    Indications:Pneumothorax    Anesthes

## 2018-07-28 NOTE — PLAN OF CARE
Assumed care at 0730  A&Ox4, VSS, NSR on tele  CT placed to water seal at 1400. Repeat CXR showing worsening pneumo. Dr. Andria Rachel notified  CT placed back to suction at 1730 as directed. Incision site redressed with vaseline gauze and tegaderm.  Plan for r

## 2018-07-28 NOTE — PLAN OF CARE
Assumed care at [address] [place] [address]. A&Ox4. VSS on RA. Right chest tube to suction. PRN Norco for pain. Voiding adequately. Resting in bed comfortably. Will continue to monitor.      DISCHARGE PLANNING    • Discharge to home or other facility with appropriate r

## 2018-07-28 NOTE — PROGRESS NOTES
[facility]  Progress Note    Nancy Simmons Patient Status:  Inpatient    1960 MRN [id number]   [facility] 7NE-A Attending Federica Lobo MD   Hosp Day # 3 PCP Dada Alberto MD         SUBJECTIVE:  Subjective:  Nancy Simmons is enoxaparin  40 mg Subcutaneous Daily   • Amitriptyline HCl  25 mg Oral Daily   • DULoxetine HCl  60 mg Oral BID   • fenofibrate micronized  134 mg Oral Daily with breakfast   • melatonin  10 mg Oral QPM     • sodium chloride Stopped (07/27/18 1100)     HYD

## 2018-07-28 NOTE — PROGRESS NOTES
Pulmonary Progress Note        NAME: Candance Jerry - ROOM: 8302/6068-L - MRN: [id number] - Age: 62year old - : 1960        SUBJECTIVE: No events overnight, feels ok this afternoon    OBJECTIVE:   18  0000 18  0445 18  0830  last 72 hours. Invalid input(s): ALPHOS, TBIL, DBIL, TPROT    Invalid input(s): ARTERIALPH, ARTERIALPO2, ARTERIALPCO2, ARTERIALHCO3    No results for input(s): BNP in the last 72 hours.     Invalid input(s): TROPI      Albumin   Date/Time Value Ref Range

## 2018-07-29 LAB
ANION GAP SERPL CALC-SCNC: 5 MMOL/L (ref 0–18)
BASOPHILS # BLD AUTO: 0.03 X10(3) UL (ref 0–0.1)
BASOPHILS NFR BLD AUTO: 0.6 %
BUN BLD-MCNC: 13 MG/DL (ref 8–20)
BUN/CREAT SERPL: 18.6 (ref 10–20)
CALCIUM BLD-MCNC: 9.3 MG/DL (ref 8.3–10.3)
CHLORIDE SERPL-SCNC: 102 MMOL/L (ref 101–111)
CO2 SERPL-SCNC: 31 MMOL/L (ref 22–32)
CREAT BLD-MCNC: 0.7 MG/DL (ref 0.7–1.3)
EOSINOPHIL # BLD AUTO: 0.32 X10(3) UL (ref 0–0.3)
EOSINOPHIL NFR BLD AUTO: 5.9 %
ERYTHROCYTE [DISTWIDTH] IN BLOOD BY AUTOMATED COUNT: 13.4 % (ref 11.5–16)
GLUCOSE BLD-MCNC: 91 MG/DL (ref 70–99)
HCT VFR BLD AUTO: 39.8 % (ref 37–53)
HGB BLD-MCNC: 13.1 G/DL (ref 13–17)
IMMATURE GRANULOCYTE COUNT: 0.02 X10(3) UL (ref 0–1)
IMMATURE GRANULOCYTE RATIO %: 0.4 %
LYMPHOCYTES # BLD AUTO: 1.88 X10(3) UL (ref 0.9–4)
LYMPHOCYTES NFR BLD AUTO: 34.7 %
MCH RBC QN AUTO: 30.5 PG (ref 27–33.2)
MCHC RBC AUTO-ENTMCNC: 32.9 G/DL (ref 31–37)
MCV RBC AUTO: 92.8 FL (ref 80–99)
MONOCYTES # BLD AUTO: 0.51 X10(3) UL (ref 0.1–1)
MONOCYTES NFR BLD AUTO: 9.4 %
NEUTROPHIL ABS PRELIM: 2.66 X10 (3) UL (ref 1.3–6.7)
NEUTROPHILS # BLD AUTO: 2.66 X10(3) UL (ref 1.3–6.7)
NEUTROPHILS NFR BLD AUTO: 49 %
OSMOLALITY SERPL CALC.SUM OF ELEC: 286 MOSM/KG (ref 275–295)
PLATELET # BLD AUTO: 190 10(3)UL (ref 150–450)
POTASSIUM SERPL-SCNC: 3.9 MMOL/L (ref 3.6–5.1)
RBC # BLD AUTO: 4.29 X10(6)UL (ref 4.3–5.7)
RED CELL DISTRIBUTION WIDTH-SD: 45.6 FL (ref 35.1–46.3)
SODIUM SERPL-SCNC: 138 MMOL/L (ref 136–144)
WBC # BLD AUTO: 5.4 X10(3) UL (ref 4–13)

## 2018-07-29 RX ORDER — DOCUSATE SODIUM 100 MG/1
100 CAPSULE, LIQUID FILLED ORAL 2 TIMES DAILY
Status: DISCONTINUED | OUTPATIENT
Start: 2018-07-29 | End: 2018-07-31

## 2018-07-29 RX ORDER — POLYETHYLENE GLYCOL 3350 17 G/17G
17 POWDER, FOR SOLUTION ORAL DAILY
Status: DISCONTINUED | OUTPATIENT
Start: 2018-07-29 | End: 2018-07-31

## 2018-07-29 RX ORDER — FLUTICASONE PROPIONATE 50 MCG
1 SPRAY, SUSPENSION (ML) NASAL DAILY
Status: DISCONTINUED | OUTPATIENT
Start: 2018-07-29 | End: 2018-07-31

## 2018-07-29 NOTE — PROGRESS NOTES
Pulmonary Progress Note        NAME: Mildred Vyas - ROOM: 2962/9122-A - MRN: [id number] - Age: 62year old - : 1960        SUBJECTIVE: no complaints this AM, denies chest pain/SOB    OBJECTIVE:   18  0005 18  0525 18  0844  72 hours. Invalid input(s): CREATININE    No results for input(s): ALT, AST, ALB, AMYLASE, LIPASE, LDH in the last 72 hours.     Invalid input(s): ALPHOS, TBIL, DBIL, TPROT    Invalid input(s): ARTERIALPH, ARTERIALPO2, ARTERIALPCO2, ARTERIALHCO3    No re

## 2018-07-29 NOTE — PROGRESS NOTES
[facility]  Progress Note    Jadiel Fang Patient Status:  Inpatient    1960 MRN [id number]   [facility] 7NE-A Attending Sunita Falk MD   [address] Day # 4 PCP Najma Smith MD         SUBJECTIVE:  Subjective:  Jadiel Fang is TID   • Alfuzosin HCl ER  10 mg Oral Daily with breakfast   • sodium chloride 0.9%  1,000 mL Intravenous Once   • enoxaparin  40 mg Subcutaneous Daily   • Amitriptyline HCl  25 mg Oral Daily   • DULoxetine HCl  60 mg Oral BID   • fenofibrate micronized  13

## 2018-07-29 NOTE — PLAN OF CARE
Assumed care at 0700. No acute issues today. Prn Norco for pain. A/ox 4  RA  NSR on tele. Right anterior CT to -20cm suction. No output today. Up walking in hallway w/ PCT, Richrd Duty. Vitals stable.      Repeat CXR in am.     DISCHARGE PLANNING    • Mikayla Ayala

## 2018-07-29 NOTE — PLAN OF CARE
Assumed care at [address] [facility]. A&Ox4. VSS on RA. NSR per tele. Norco given for pain with relief. Chest tube intact to suction. Resting in bed comfortably. Will continue to monitor.      DISCHARGE PLANNING    • Discharge to home or other facility with approp

## 2018-07-30 ENCOUNTER — APPOINTMENT (OUTPATIENT)
Dept: GENERAL RADIOLOGY | Facility: HOSPITAL | Age: 58
DRG: 199 | End: 2018-07-30
Attending: INTERNAL MEDICINE
Payer: MEDICARE

## 2018-07-30 PROCEDURE — 71045 X-RAY EXAM CHEST 1 VIEW: CPT | Performed by: INTERNAL MEDICINE

## 2018-07-30 RX ORDER — CLONAZEPAM 1 MG/1
1 TABLET ORAL 3 TIMES DAILY
Qty: 15 TABLET | Refills: 0 | Status: SHIPPED | OUTPATIENT
Start: 2018-07-30

## 2018-07-30 RX ORDER — ALFUZOSIN HYDROCHLORIDE 10 MG/1
10 TABLET, EXTENDED RELEASE ORAL
Qty: 30 TABLET | Refills: 0 | Status: SHIPPED | OUTPATIENT
Start: 2018-07-31 | End: 2020-06-20

## 2018-07-30 NOTE — PLAN OF CARE
Pt A/Ox4, on RA, NSR per tele  C/o Rt chest tube site pain, norco given with relief  Pt up with 1 assist, tolerating well  Rt chest tube in place, to water seal at 1000  PLAN: repeat CXR at 1200  Will cont to monitor     Addendum:  1445 Chest tube put back
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
## 2018-07-30 NOTE — PLAN OF CARE
Assumed care at [address] [facility]. A&Ox4. VSS on RA. NSR per tele. C/o pain to rt chest, PRN Norco w/ relief. CT to suction intact. Plan for repeat cxray today. Resting in bed comfortably. Will continue to monitor.     DISCHARGE PLANNING    • Discharge to mable

## 2018-07-30 NOTE — PROGRESS NOTES
[facility]  Progress Note    Karol Martinez Patient Status:  Inpatient    1960 MRN [id number]   [facility] 7NE-A Attending Gayathri Noyola MD   Hosp Day # 5 PCP Irene Fuchs MD         SUBJECTIVE:  Subjective:  Karol Martinez is sodium  100 mg Oral BID   • Fluticasone Propionate  1 spray Each Nare Daily   • ClonazePAM  1 mg Oral TID   • Alfuzosin HCl ER  10 mg Oral Daily with breakfast   • enoxaparin  40 mg Subcutaneous Daily   • Amitriptyline HCl  25 mg Oral Daily   • DULoxetine
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
## 2018-07-31 ENCOUNTER — APPOINTMENT (OUTPATIENT)
Dept: CT IMAGING | Facility: HOSPITAL | Age: 58
DRG: 199 | End: 2018-07-31
Attending: INTERNAL MEDICINE
Payer: MEDICARE

## 2018-07-31 ENCOUNTER — APPOINTMENT (OUTPATIENT)
Dept: GENERAL RADIOLOGY | Facility: HOSPITAL | Age: 58
DRG: 199 | End: 2018-07-31
Attending: INTERNAL MEDICINE
Payer: MEDICARE

## 2018-07-31 VITALS
HEIGHT: 70 IN | SYSTOLIC BLOOD PRESSURE: 117 MMHG | BODY MASS INDEX: 30.06 KG/M2 | WEIGHT: 210 LBS | RESPIRATION RATE: 18 BRPM | HEART RATE: 101 BPM | DIASTOLIC BLOOD PRESSURE: 80 MMHG | TEMPERATURE: 99 F | OXYGEN SATURATION: 94 %

## 2018-07-31 PROCEDURE — 71045 X-RAY EXAM CHEST 1 VIEW: CPT | Performed by: INTERNAL MEDICINE

## 2018-07-31 PROCEDURE — 71250 CT THORAX DX C-: CPT | Performed by: INTERNAL MEDICINE

## 2018-07-31 NOTE — PROGRESS NOTES
[facility]  Progress Note    Rachelle Arleth Patient Status:  Inpatient    1960 MRN [id number]   [facility] 7NE-A Attending Romario Kilpatrick MD   [facility] Day # 6 PCP Roby Barksdale MD         SUBJECTIVE:  Subjective:  Rachellecandice Reinoso is Fluticasone Propionate  1 spray Each Nare Daily   • ClonazePAM  1 mg Oral TID   • Alfuzosin HCl ER  10 mg Oral Daily with breakfast   • enoxaparin  40 mg Subcutaneous Daily   • Amitriptyline HCl  25 mg Oral Daily   • DULoxetine HCl  60 mg Oral BID   • feno

## 2018-07-31 NOTE — PROGRESS NOTES
THORACIC SURGERY PROGRESS NOTE  Hosea Dawson is a 62year old male. MRN [id number]. Admitted 2018    [address] [place] [address] EVENTS:     No acute events overnight. Pain at chest tube site. CT this morning.     VITALS:     Temp (24hrs), Av.3 °F (36.8 °C), Ngoc Vann  Thoracic Surgery  Pager: [phone]

## 2018-07-31 NOTE — PLAN OF CARE
Patient alert and oriented times four. Meds given per MAR. Vital signs stable. CT to suction. Up with 1 person standby to bathroom. Call light in reach. Resting comfortably in bed.      DISCHARGE PLANNING    • Discharge to home or other facility with appro
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
## 2018-07-31 NOTE — PLAN OF CARE
NURSING DISCHARGE NOTE    Discharged Home via Wheelchair. Accompanied by Support staff  Belongings Taken by patient/family. Discharge instructions given to patient, all questions and concerns answered.  Patient aware to get follow up chest xray in a

## 2018-08-01 NOTE — CM/SW NOTE
08/01/18 0900   Discharge disposition   Expected discharge disposition Home or Self   Name of [address] [place] [address] services after discharge None   Discharge transportation Private car

## 2018-08-07 NOTE — DISCHARGE SUMMARY
[facility]  Discharge Summary    Lemuel Hitchcock Patient Status:  Inpatient    1960 MRN [id number]   [facility] 7NE-A Attending No att. providers found   Hosp Day # 6 PCP Isabela Stokes MD     Date of Admission: 2018    Date o 138   K  4.2  3.9   CL  105  102   CO2  29.0  31.0   BUN  28*  13   CREATSERUM  0.86  0.70   CA  8.9  9.3   GLU  99  91             Recent Labs   Lab  07/25/18   0456   ALT  35   AST  34   ALB  3.9         No results for input(s): PGLU in the last 168 hour ct brain     8       NIDIA- pulm/ pcp to address after dc- he refuses cpap              See tests ordered,  Available and radiology reviewed  All consultant notes reviewed  Discussed with nursing on floor  dvt prophylaxis reviewed  PT and/or OT            Co

## 2019-04-30 ENCOUNTER — APPOINTMENT (OUTPATIENT)
Dept: CT IMAGING | Facility: HOSPITAL | Age: 59
End: 2019-04-30
Payer: MEDICARE

## 2019-04-30 PROCEDURE — 70486 CT MAXILLOFACIAL W/O DYE: CPT

## 2019-04-30 PROCEDURE — 70450 CT HEAD/BRAIN W/O DYE: CPT

## 2019-04-30 PROCEDURE — 76377 3D RENDER W/INTRP POSTPROCES: CPT

## 2019-04-30 PROCEDURE — 72125 CT NECK SPINE W/O DYE: CPT
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
## 2019-09-13 ENCOUNTER — HOSPITAL ENCOUNTER (OUTPATIENT)
Dept: GENERAL RADIOLOGY | Facility: HOSPITAL | Age: 59
Discharge: HOME | End: 2019-09-13
Attending: SPECIALIST
Payer: MEDICARE

## 2019-09-13 DIAGNOSIS — R05.9 COUGH: ICD-10-CM

## 2019-09-13 PROCEDURE — 71046 X-RAY EXAM CHEST 2 VIEWS: CPT | Performed by: SPECIALIST

## 2020-06-20 ENCOUNTER — APPOINTMENT (OUTPATIENT)
Dept: GENERAL RADIOLOGY | Facility: HOSPITAL | Age: 60
End: 2020-06-20
Attending: EMERGENCY MEDICINE
Payer: MEDICARE

## 2020-06-20 ENCOUNTER — HOSPITAL ENCOUNTER (EMERGENCY)
Facility: HOSPITAL | Age: 60
Discharge: HOME | End: 2020-06-20
Attending: EMERGENCY MEDICINE
Payer: MEDICARE

## 2020-06-20 ENCOUNTER — APPOINTMENT (OUTPATIENT)
Dept: CT IMAGING | Facility: HOSPITAL | Age: 60
End: 2020-06-20
Attending: EMERGENCY MEDICINE
Payer: MEDICARE

## 2020-06-20 VITALS
TEMPERATURE: 97 F | BODY MASS INDEX: 34.36 KG/M2 | SYSTOLIC BLOOD PRESSURE: 113 MMHG | DIASTOLIC BLOOD PRESSURE: 81 MMHG | HEIGHT: 70 IN | RESPIRATION RATE: 18 BRPM | WEIGHT: 240 LBS | OXYGEN SATURATION: 98 % | HEART RATE: 83 BPM

## 2020-06-20 DIAGNOSIS — S20.212A CONTUSION OF RIB ON LEFT SIDE, INITIAL ENCOUNTER: ICD-10-CM

## 2020-06-20 DIAGNOSIS — S63.91XA HAND SPRAIN, RIGHT, INITIAL ENCOUNTER: ICD-10-CM

## 2020-06-20 DIAGNOSIS — S09.8XXA BLUNT HEAD TRAUMA, INITIAL ENCOUNTER: Primary | ICD-10-CM

## 2020-06-20 PROCEDURE — 99284 EMERGENCY DEPT VISIT MOD MDM: CPT

## 2020-06-20 PROCEDURE — 99285 EMERGENCY DEPT VISIT HI MDM: CPT

## 2020-06-20 PROCEDURE — 70450 CT HEAD/BRAIN W/O DYE: CPT | Performed by: EMERGENCY MEDICINE

## 2020-06-20 PROCEDURE — 93010 ELECTROCARDIOGRAM REPORT: CPT

## 2020-06-20 PROCEDURE — 73130 X-RAY EXAM OF HAND: CPT | Performed by: EMERGENCY MEDICINE

## 2020-06-20 PROCEDURE — 71045 X-RAY EXAM CHEST 1 VIEW: CPT | Performed by: EMERGENCY MEDICINE

## 2020-06-20 PROCEDURE — 93005 ELECTROCARDIOGRAM TRACING: CPT

## 2020-06-20 RX ORDER — QUETIAPINE 50 MG/1
TABLET, FILM COATED ORAL
COMMUNITY

## 2020-06-21 NOTE — ED PROVIDER NOTES
Patient Seen in: [facility] Emergency Department      History   Patient presents with:  Fall    Stated Complaint: Fall    HPI    This is a 61-year-old male with past medical history of hypertension, obstructive sleep apnea, hyperlipidemia, anxiety, parietal scalp. Pupils equally round and reactive to light. Conjuctiva clear. Oropharynx is clear and moist.  No cervical spine tenderness. Lungs: Clear to auscultation bilaterally with no rales, no retractions, and no wheezing.   HEART:  Regular rate an Radiology Data Registry) which includes the Dose Index Registry. PATIENT STATED HISTORY: (As transcribed by Technologist)  Patient presents after a fall yesterday    FINDINGS:  VENTRICLES/SULCI:  Ventricles and sulci are normal in size.   INTRACRANIAL:  Th with primary Monday. Patient was discharged home in good condition.               Disposition and Plan     Clinical Impression:  Blunt head trauma, initial encounter  (primary encounter diagnosis)  Contusion of rib on left side, initial encounter  Hand spr

## 2021-05-31 ENCOUNTER — HOSPITAL ENCOUNTER (EMERGENCY)
Facility: HOSPITAL | Age: 61
Discharge: HOME | End: 2021-05-31
Attending: EMERGENCY MEDICINE
Payer: MEDICARE

## 2021-05-31 ENCOUNTER — APPOINTMENT (OUTPATIENT)
Dept: CT IMAGING | Facility: HOSPITAL | Age: 61
End: 2021-05-31
Attending: EMERGENCY MEDICINE
Payer: MEDICARE

## 2021-05-31 ENCOUNTER — APPOINTMENT (OUTPATIENT)
Dept: GENERAL RADIOLOGY | Facility: HOSPITAL | Age: 61
End: 2021-05-31
Attending: EMERGENCY MEDICINE
Payer: MEDICARE

## 2021-05-31 VITALS
RESPIRATION RATE: 13 BRPM | DIASTOLIC BLOOD PRESSURE: 92 MMHG | SYSTOLIC BLOOD PRESSURE: 131 MMHG | OXYGEN SATURATION: 100 % | HEART RATE: 87 BPM | TEMPERATURE: 98 F

## 2021-05-31 DIAGNOSIS — S82.141A CLOSED FRACTURE OF RIGHT TIBIAL PLATEAU, INITIAL ENCOUNTER: Primary | ICD-10-CM

## 2021-05-31 DIAGNOSIS — F10.920 ALCOHOLIC INTOXICATION WITHOUT COMPLICATION (HCC): ICD-10-CM

## 2021-05-31 DIAGNOSIS — T07.XXXA MULTIPLE ABRASIONS: ICD-10-CM

## 2021-05-31 PROCEDURE — 73502 X-RAY EXAM HIP UNI 2-3 VIEWS: CPT | Performed by: EMERGENCY MEDICINE

## 2021-05-31 PROCEDURE — 99284 EMERGENCY DEPT VISIT MOD MDM: CPT

## 2021-05-31 PROCEDURE — 36415 COLL VENOUS BLD VENIPUNCTURE: CPT

## 2021-05-31 PROCEDURE — 99285 EMERGENCY DEPT VISIT HI MDM: CPT

## 2021-05-31 PROCEDURE — 85025 COMPLETE CBC W/AUTO DIFF WBC: CPT | Performed by: EMERGENCY MEDICINE

## 2021-05-31 PROCEDURE — 72040 X-RAY EXAM NECK SPINE 2-3 VW: CPT | Performed by: EMERGENCY MEDICINE

## 2021-05-31 PROCEDURE — 70450 CT HEAD/BRAIN W/O DYE: CPT | Performed by: EMERGENCY MEDICINE

## 2021-05-31 PROCEDURE — 72125 CT NECK SPINE W/O DYE: CPT | Performed by: EMERGENCY MEDICINE

## 2021-05-31 PROCEDURE — 82077 ASSAY SPEC XCP UR&BREATH IA: CPT | Performed by: EMERGENCY MEDICINE

## 2021-05-31 PROCEDURE — 73080 X-RAY EXAM OF ELBOW: CPT | Performed by: EMERGENCY MEDICINE

## 2021-05-31 PROCEDURE — 80053 COMPREHEN METABOLIC PANEL: CPT | Performed by: EMERGENCY MEDICINE

## 2021-05-31 PROCEDURE — 73560 X-RAY EXAM OF KNEE 1 OR 2: CPT | Performed by: EMERGENCY MEDICINE

## 2021-05-31 RX ORDER — HYDROCODONE BITARTRATE AND ACETAMINOPHEN 5; 325 MG/1; MG/1
1 TABLET ORAL ONCE
Status: COMPLETED | OUTPATIENT
Start: 2021-05-31 | End: 2021-05-31

## 2021-05-31 RX ORDER — HYDROCODONE BITARTRATE AND ACETAMINOPHEN 5; 325 MG/1; MG/1
1-2 TABLET ORAL EVERY 6 HOURS PRN
Qty: 10 TABLET | Refills: 0 | Status: SHIPPED | OUTPATIENT
Start: 2021-05-31 | End: 2021-06-07
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
## 2021-06-02 PROCEDURE — 36415 COLL VENOUS BLD VENIPUNCTURE: CPT

## 2021-06-02 PROCEDURE — 99284 EMERGENCY DEPT VISIT MOD MDM: CPT

## 2021-06-03 ENCOUNTER — APPOINTMENT (OUTPATIENT)
Dept: GENERAL RADIOLOGY | Facility: HOSPITAL | Age: 61
End: 2021-06-03
Attending: EMERGENCY MEDICINE
Payer: MEDICARE

## 2021-06-03 ENCOUNTER — HOSPITAL ENCOUNTER (EMERGENCY)
Facility: HOSPITAL | Age: 61
Discharge: HOME | End: 2021-06-03
Attending: EMERGENCY MEDICINE
Payer: MEDICARE

## 2021-06-03 ENCOUNTER — APPOINTMENT (OUTPATIENT)
Dept: CT IMAGING | Facility: HOSPITAL | Age: 61
End: 2021-06-03
Attending: EMERGENCY MEDICINE
Payer: MEDICARE

## 2021-06-03 VITALS
WEIGHT: 250 LBS | TEMPERATURE: 97 F | RESPIRATION RATE: 20 BRPM | OXYGEN SATURATION: 94 % | HEART RATE: 98 BPM | SYSTOLIC BLOOD PRESSURE: 126 MMHG | HEIGHT: 70 IN | DIASTOLIC BLOOD PRESSURE: 77 MMHG | BODY MASS INDEX: 35.79 KG/M2

## 2021-06-03 DIAGNOSIS — S22.32XA CLOSED FRACTURE OF ONE RIB OF LEFT SIDE, INITIAL ENCOUNTER: Primary | ICD-10-CM

## 2021-06-03 PROCEDURE — 74177 CT ABD & PELVIS W/CONTRAST: CPT | Performed by: EMERGENCY MEDICINE

## 2021-06-03 PROCEDURE — 71260 CT THORAX DX C+: CPT | Performed by: EMERGENCY MEDICINE

## 2021-06-03 PROCEDURE — 80053 COMPREHEN METABOLIC PANEL: CPT | Performed by: EMERGENCY MEDICINE

## 2021-06-03 PROCEDURE — 85025 COMPLETE CBC W/AUTO DIFF WBC: CPT | Performed by: EMERGENCY MEDICINE

## 2021-06-03 PROCEDURE — 71045 X-RAY EXAM CHEST 1 VIEW: CPT | Performed by: EMERGENCY MEDICINE

## 2021-06-03 PROCEDURE — 82550 ASSAY OF CK (CPK): CPT | Performed by: EMERGENCY MEDICINE
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
## 2021-06-04 ENCOUNTER — TELEPHONE (OUTPATIENT)
Dept: ORTHOPEDICS CLINIC | Facility: CLINIC | Age: 61
End: 2021-06-04

## 2021-06-04 DIAGNOSIS — S82.141A CLOSED FRACTURE OF RIGHT TIBIAL PLATEAU, INITIAL ENCOUNTER: Primary | ICD-10-CM

## 2021-06-04 NOTE — TELEPHONE ENCOUNTER
OK to book for next Thursday  I put in an order for CT of the knee - patient should have this done prior to the appointment

## 2021-06-04 NOTE — TELEPHONE ENCOUNTER
Spoke with patient and notified him that Dr. Charline Gill would like him to have an appointment on Thursday, with CT scan done beforehand. Pt verbalized understanding. Appt time/date/location confirmed with patient. Pt verbalized understanding.  No further ques
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
## 2021-06-08 ENCOUNTER — HOSPITAL ENCOUNTER (OUTPATIENT)
Dept: CT IMAGING | Facility: HOSPITAL | Age: 61
Discharge: HOME | End: 2021-06-08
Attending: ORTHOPAEDIC SURGERY
Payer: MEDICARE

## 2021-06-08 DIAGNOSIS — S82.141A CLOSED FRACTURE OF RIGHT TIBIAL PLATEAU, INITIAL ENCOUNTER: ICD-10-CM

## 2021-06-08 PROCEDURE — 76377 3D RENDER W/INTRP POSTPROCES: CPT | Performed by: ORTHOPAEDIC SURGERY

## 2021-06-08 PROCEDURE — 73700 CT LOWER EXTREMITY W/O DYE: CPT | Performed by: ORTHOPAEDIC SURGERY

## 2021-06-10 ENCOUNTER — OFFICE VISIT (OUTPATIENT)
Dept: ORTHOPEDICS CLINIC | Facility: CLINIC | Age: 61
End: 2021-06-10
Payer: COMMERCIAL

## 2021-06-10 VITALS — OXYGEN SATURATION: 97 % | HEART RATE: 112 BPM

## 2021-06-10 DIAGNOSIS — S82.141A CLOSED FRACTURE OF RIGHT TIBIAL PLATEAU, INITIAL ENCOUNTER: Primary | ICD-10-CM

## 2021-06-10 PROCEDURE — L1833 KO ADJ JNT POS R SUP PRE OTS: HCPCS | Performed by: ORTHOPAEDIC SURGERY

## 2021-06-10 PROCEDURE — 27530 TREAT KNEE FRACTURE: CPT | Performed by: ORTHOPAEDIC SURGERY

## 2021-06-10 PROCEDURE — 99203 OFFICE O/P NEW LOW 30 MIN: CPT | Performed by: ORTHOPAEDIC SURGERY
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
## 2021-06-11 ENCOUNTER — TELEPHONE (OUTPATIENT)
Dept: ORTHOPEDICS CLINIC | Facility: CLINIC | Age: 61
End: 2021-06-11

## 2021-06-11 NOTE — TELEPHONE ENCOUNTER
Patient called again regarding message below.  Patient would like percocet to be placed in to [facility]     [phone]
Patient states Dr Reese Lozano was going to order Percocet for his right knee pain, after he was seen in the office yesterday. It has not been ordered. His [facility] [address] in [place] is on file. Patient would like a call once is it ordered.
Please advise     LOV: 6/10/21 Patient is asking for a stronger pain medication, he states he has used Percocet in the past.  We will refill this for him today. He will follow up in 1 month with x-rays of the knee.
order will be placed later tell him he can  tonight
7

## 2021-07-08 RX ORDER — OXYCODONE HYDROCHLORIDE AND ACETAMINOPHEN 5; 325 MG/1; MG/1
1 TABLET ORAL EVERY 8 HOURS PRN
Qty: 30 TABLET | Refills: 0 | Status: SHIPPED | OUTPATIENT
Start: 2021-07-08

## 2021-07-08 NOTE — TELEPHONE ENCOUNTER
LOV: 6/10/21  RTC: 1 mos  Filled:   Medication Quantity Refills Start End   oxyCODONE-acetaminophen (PERCOCET) 5-325 MG Oral Tab 30 tablet 0 6/11/2021    Sig:   Take 1-2 tablets by mouth every 6 (six) hours as needed for Pain.        Future Appointments   D

## 2021-07-14 ENCOUNTER — OFFICE VISIT (OUTPATIENT)
Dept: ORTHOPEDICS CLINIC | Facility: CLINIC | Age: 61
End: 2021-07-14
Payer: COMMERCIAL

## 2021-07-14 ENCOUNTER — HOSPITAL ENCOUNTER (OUTPATIENT)
Dept: GENERAL RADIOLOGY | Age: 61
Discharge: HOME | End: 2021-07-14
Attending: ORTHOPAEDIC SURGERY
Payer: MEDICARE

## 2021-07-14 VITALS — RESPIRATION RATE: 16 BRPM | BODY MASS INDEX: 36 KG/M2 | HEART RATE: 112 BPM | HEIGHT: 70 IN | OXYGEN SATURATION: 99 %

## 2021-07-14 DIAGNOSIS — S82.141D CLOSED FRACTURE OF RIGHT TIBIAL PLATEAU WITH ROUTINE HEALING, SUBSEQUENT ENCOUNTER: Primary | ICD-10-CM

## 2021-07-14 DIAGNOSIS — S82.141A CLOSED FRACTURE OF RIGHT TIBIAL PLATEAU, INITIAL ENCOUNTER: ICD-10-CM

## 2021-07-14 PROCEDURE — 73560 X-RAY EXAM OF KNEE 1 OR 2: CPT | Performed by: ORTHOPAEDIC SURGERY

## 2021-07-14 PROCEDURE — 99024 POSTOP FOLLOW-UP VISIT: CPT | Performed by: ORTHOPAEDIC SURGERY
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
## 2021-08-27 RX ORDER — OXYCODONE HYDROCHLORIDE AND ACETAMINOPHEN 5; 325 MG/1; MG/1
1 TABLET ORAL EVERY 8 HOURS PRN
Qty: 30 TABLET | Refills: 0 | OUTPATIENT
Start: 2021-08-27

## 2021-08-27 NOTE — TELEPHONE ENCOUNTER
LOV: 7/14/21  RTC: 6wks  Filled:    Quantity Refills Start End   oxyCODONE-acetaminophen 5-325 MG Oral Tab 30 tablet 0 7/8/2021    Sig:   Take 1 tablet by mouth every 8 (eight) hours as needed for Pain.        [place] Written Strength Quantity R

## 2021-09-01 NOTE — TELEPHONE ENCOUNTER
Patient states has been more active and thinks he may have over done it with the activity and he is now having pain at night. Pain level is 3/10 with OTC medications. Patient states hes tried Tylenol and Ibuprofen and this has not helped.  Patient has also

## 2021-09-03 NOTE — TELEPHONE ENCOUNTER
Attempted to reach patient, no answer, left message on identifying voicemail, to contact the office to discuss further

## 2021-09-29 ENCOUNTER — TELEPHONE (OUTPATIENT)
Dept: ORTHOPEDICS CLINIC | Facility: CLINIC | Age: 61
End: 2021-09-29

## 2021-09-29 ENCOUNTER — OFFICE VISIT (OUTPATIENT)
Dept: ORTHOPEDICS CLINIC | Facility: CLINIC | Age: 61
End: 2021-09-29
Payer: COMMERCIAL

## 2021-09-29 ENCOUNTER — HOSPITAL ENCOUNTER (OUTPATIENT)
Dept: GENERAL RADIOLOGY | Age: 61
Discharge: HOME | End: 2021-09-29
Attending: ORTHOPAEDIC SURGERY
Payer: MEDICARE

## 2021-09-29 VITALS — HEART RATE: 99 BPM | OXYGEN SATURATION: 95 %

## 2021-09-29 DIAGNOSIS — S82.141D CLOSED FRACTURE OF RIGHT TIBIAL PLATEAU WITH ROUTINE HEALING, SUBSEQUENT ENCOUNTER: ICD-10-CM

## 2021-09-29 DIAGNOSIS — S82.141D CLOSED FRACTURE OF RIGHT TIBIAL PLATEAU WITH ROUTINE HEALING, SUBSEQUENT ENCOUNTER: Primary | ICD-10-CM

## 2021-09-29 PROCEDURE — 73560 X-RAY EXAM OF KNEE 1 OR 2: CPT | Performed by: ORTHOPAEDIC SURGERY

## 2021-09-29 PROCEDURE — 99024 POSTOP FOLLOW-UP VISIT: CPT | Performed by: ORTHOPAEDIC SURGERY
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
## 2021-09-29 NOTE — TELEPHONE ENCOUNTER
Pt requesting medical records from June -July 14 , 2021. ER, discharge summary, H&P, consultations, CD and films etc.  Please mail to pt address on file. Faxed to [employer] [phone], confirmatiion received and copy sent to scanning.

## 2022-04-29 ENCOUNTER — HOSPITAL ENCOUNTER (OUTPATIENT)
Dept: GENERAL RADIOLOGY | Facility: HOSPITAL | Age: 62
Discharge: HOME | End: 2022-04-29
Attending: SPECIALIST
Payer: MEDICARE

## 2022-04-29 DIAGNOSIS — M54.13 RADICULOPATHY OF CERVICOTHORACIC REGION: ICD-10-CM

## 2022-04-29 PROCEDURE — 72050 X-RAY EXAM NECK SPINE 4/5VWS: CPT | Performed by: SPECIALIST

## 2022-06-02 ENCOUNTER — HOSPITAL ENCOUNTER (EMERGENCY)
Facility: HOSPITAL | Age: 62
Discharge: HOME | End: 2022-06-02
Attending: EMERGENCY MEDICINE
Payer: MEDICARE

## 2022-06-02 VITALS
DIASTOLIC BLOOD PRESSURE: 82 MMHG | HEART RATE: 87 BPM | RESPIRATION RATE: 16 BRPM | TEMPERATURE: 98 F | OXYGEN SATURATION: 98 % | SYSTOLIC BLOOD PRESSURE: 118 MMHG

## 2022-06-02 DIAGNOSIS — T24.231A PARTIAL THICKNESS BURN OF RIGHT LOWER LEG, INITIAL ENCOUNTER: Primary | ICD-10-CM

## 2022-06-02 PROCEDURE — 16020 DRESS/DEBRID P-THICK BURN S: CPT

## 2022-06-02 PROCEDURE — 99283 EMERGENCY DEPT VISIT LOW MDM: CPT

## 2022-06-02 RX ORDER — HYDROCODONE BITARTRATE AND ACETAMINOPHEN 5; 325 MG/1; MG/1
1 TABLET ORAL EVERY 6 HOURS PRN
Qty: 10 TABLET | Refills: 0 | Status: SHIPPED | OUTPATIENT
Start: 2022-06-02

## 2022-06-02 RX ORDER — IBUPROFEN 200 MG
400 TABLET ORAL ONCE
Status: COMPLETED | OUTPATIENT
Start: 2022-06-02 | End: 2022-06-02

## 2022-06-02 RX ORDER — HYDROCODONE BITARTRATE AND ACETAMINOPHEN 5; 325 MG/1; MG/1
1 TABLET ORAL ONCE
Status: COMPLETED | OUTPATIENT
Start: 2022-06-02 | End: 2022-06-02
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
## 2022-08-01 DIAGNOSIS — S82.091K: Primary | ICD-10-CM

## 2022-09-22 ENCOUNTER — HOSPITAL ENCOUNTER (OUTPATIENT)
Dept: GENERAL RADIOLOGY | Facility: HOSPITAL | Age: 62
Discharge: HOME | End: 2022-09-22
Attending: STUDENT IN AN ORGANIZED HEALTH CARE EDUCATION/TRAINING PROGRAM

## 2022-09-22 ENCOUNTER — HOSPITAL ENCOUNTER (OUTPATIENT)
Dept: MRI IMAGING | Facility: HOSPITAL | Age: 62
Discharge: HOME | End: 2022-09-22
Attending: STUDENT IN AN ORGANIZED HEALTH CARE EDUCATION/TRAINING PROGRAM

## 2022-09-22 DIAGNOSIS — S82.091K: ICD-10-CM

## 2022-09-22 DIAGNOSIS — M54.16 LUMBAR RADICULOPATHY: ICD-10-CM

## 2022-09-22 DIAGNOSIS — R42 DIZZINESS: ICD-10-CM

## 2022-09-22 PROCEDURE — 70551 MRI BRAIN STEM W/O DYE: CPT | Performed by: STUDENT IN AN ORGANIZED HEALTH CARE EDUCATION/TRAINING PROGRAM

## 2022-09-22 PROCEDURE — 73590 X-RAY EXAM OF LOWER LEG: CPT | Performed by: STUDENT IN AN ORGANIZED HEALTH CARE EDUCATION/TRAINING PROGRAM

## 2022-09-22 PROCEDURE — 72148 MRI LUMBAR SPINE W/O DYE: CPT | Performed by: STUDENT IN AN ORGANIZED HEALTH CARE EDUCATION/TRAINING PROGRAM

## 2022-09-22 PROCEDURE — 73562 X-RAY EXAM OF KNEE 3: CPT | Performed by: STUDENT IN AN ORGANIZED HEALTH CARE EDUCATION/TRAINING PROGRAM

## 2022-11-01 ENCOUNTER — EXTERNAL RECORD (OUTPATIENT)
Dept: OTHER | Age: 62
End: 2022-11-01

## 2022-12-07 ENCOUNTER — OFFICE VISIT (OUTPATIENT)
Dept: NEUROLOGY | Facility: CLINIC | Age: 62
End: 2022-12-07
Payer: COMMERCIAL

## 2022-12-07 VITALS
RESPIRATION RATE: 16 BRPM | HEART RATE: 80 BPM | DIASTOLIC BLOOD PRESSURE: 72 MMHG | BODY MASS INDEX: 31 KG/M2 | WEIGHT: 214 LBS | SYSTOLIC BLOOD PRESSURE: 128 MMHG

## 2022-12-07 DIAGNOSIS — R26.9 GAIT DISORDER: ICD-10-CM

## 2022-12-07 DIAGNOSIS — R42 DIZZINESS: Primary | ICD-10-CM

## 2022-12-07 DIAGNOSIS — G62.9 PERIPHERAL POLYNEUROPATHY: ICD-10-CM

## 2022-12-07 PROCEDURE — 99205 OFFICE O/P NEW HI 60 MIN: CPT | Performed by: OTHER

## 2022-12-07 PROCEDURE — 3078F DIAST BP <80 MM HG: CPT | Performed by: OTHER

## 2022-12-07 PROCEDURE — 3074F SYST BP LT 130 MM HG: CPT | Performed by: OTHER

## 2022-12-07 RX ORDER — MECOBAL/LEVOMEFOLAT CA/B6 PHOS 2-3-35 MG
1 TABLET ORAL DAILY
COMMUNITY
End: 2022-12-07

## 2022-12-07 RX ORDER — CLONAZEPAM 2 MG/1
1 TABLET ORAL 3 TIMES DAILY
COMMUNITY
Start: 2022-12-06

## 2022-12-07 RX ORDER — PRAVASTATIN SODIUM 20 MG
20 TABLET ORAL DAILY
COMMUNITY
Start: 2022-10-01

## 2022-12-07 RX ORDER — FOLIC ACID 1 MG/1
1 TABLET ORAL DAILY
COMMUNITY
Start: 2022-11-22

## 2022-12-07 RX ORDER — GABAPENTIN 300 MG/1
1 CAPSULE ORAL 4 TIMES DAILY
COMMUNITY
End: 2022-12-07

## 2022-12-07 RX ORDER — LOSARTAN POTASSIUM 50 MG/1
1 TABLET ORAL DAILY
COMMUNITY
Start: 2022-10-03

## 2022-12-07 RX ORDER — PREGABALIN 100 MG/1
100 CAPSULE ORAL 2 TIMES DAILY
COMMUNITY
Start: 2022-11-22

## 2022-12-07 RX ORDER — MULTIVITAMIN
TABLET ORAL
COMMUNITY

## 2022-12-07 RX ORDER — GAUZE BANDAGE 2" X 2"
1 BANDAGE TOPICAL DAILY
COMMUNITY
Start: 2022-07-26

## 2022-12-07 RX ORDER — ASPIRIN 81 MG/1
81 TABLET ORAL DAILY
COMMUNITY

## 2022-12-07 NOTE — PROGRESS NOTES
Patient states waking up with dizziness which is causing balance issues. Patient states nausea with dizziness. Patient fell last week due to the dizziness. Patient states last two falls included head trauma. Denies HA, however head pressure. Denies changes in speech. Patient states when turning his head to the right he begins to feel nauseous and a fainting sensation. Tingling in both shoulder blades. Denies vision changes. Patient states LBP, right side has numbness. Patient states burning and numbness sensation in feet. Patient states losing 60 pounds from October 2021 to May 2022.

## 2022-12-19 ENCOUNTER — HOSPITAL ENCOUNTER (EMERGENCY)
Facility: HOSPITAL | Age: 62
Discharge: HOME | End: 2022-12-20
Attending: EMERGENCY MEDICINE
Payer: MEDICARE

## 2022-12-19 ENCOUNTER — APPOINTMENT (OUTPATIENT)
Dept: GENERAL RADIOLOGY | Facility: HOSPITAL | Age: 62
End: 2022-12-19
Attending: EMERGENCY MEDICINE
Payer: MEDICARE

## 2022-12-19 DIAGNOSIS — S90.851A FOREIGN BODY IN RIGHT FOOT, INITIAL ENCOUNTER: Primary | ICD-10-CM

## 2022-12-19 PROCEDURE — 99283 EMERGENCY DEPT VISIT LOW MDM: CPT

## 2022-12-19 PROCEDURE — 73630 X-RAY EXAM OF FOOT: CPT | Performed by: EMERGENCY MEDICINE

## 2022-12-20 VITALS
OXYGEN SATURATION: 98 % | DIASTOLIC BLOOD PRESSURE: 86 MMHG | HEART RATE: 68 BPM | RESPIRATION RATE: 16 BRPM | SYSTOLIC BLOOD PRESSURE: 139 MMHG | TEMPERATURE: 99 F
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
## 2023-01-11 ENCOUNTER — TELEPHONE (OUTPATIENT)
Dept: ADMINISTRATIVE | Facility: HOSPITAL | Age: 63
End: 2023-01-11

## 2023-01-11 DIAGNOSIS — R42 VERTIGO: Primary | ICD-10-CM

## 2023-01-11 DIAGNOSIS — I20.8 STABLE ANGINA PECTORIS (HCC): Primary | ICD-10-CM

## 2023-01-11 NOTE — TELEPHONE ENCOUNTER
Good Afternoon, Please be advise that the [address] (ZRK=[id number]) was Denied by the Patient Health Plan. According to [facility] Dr. Romel Gonzalez can call and initiate a P2P to be done to see if the Denial can be overturned. All clinicals was uploaded. Patient is scheduled for tomorrow. [facility]#[id number] Tel#[phone] option#1    Thanks,  Arvid Blunt    Denial Reason:    Urgent Action Needed: Information needed in order to approve request for member   Agustin Carrion. Respond Before 1/19/2023. This notice is not a denial of coverage notice. This notice is an opportunity to provide   additional information, not previously provided, or to engage in a Xpcz-qy-Ujqv discussion,   prior to issuing a denial decision. Date: 1/10/2023 Member number: [id number]  Beneficiary's name: Agustin Carrion  This fax is to inform you that our Medical Director has reviewed your request for Pro Zhou, procedure code  Procedure Description Units   Requested  63539 Computed Tomography Angiography (CTA), a special kind   of picture of the blood vessels in your head without and   with contrast (dye)  1  28882 Computed Tomography Angiography (CTA), a special kind   of picture of the blood vessels in your neck, without and   with contrast (dye)  1  If we do not receive additional information, or a request for a Rffq-mt-Hudu discussion, to   support an approval by the date specified, the information below will be used in making a   determination. This information contains the reason why the request does not currently   meet medical necessity, and the information needed to support an approval:  For 42123 Computed Tomography Angiography (CTA), a special kind of picture of the   blood vessels in your head without and with contrast (dye) Based on evi[employer] Head   Imaging Guidelines Section(s): Dizziness/Vertigo (HD 23.1), we cannot approve this   request. Your healthcare provider told us that you have dizzy spells.  The request cannot   be approved because: You must have one of the following signs or symptoms suggestive of decreased blood   flow to the back or your brain.   -Vertigo (a sensation of feeling off balance) associated with nausea and vomiting.   -Diplopia (double vision). -Visual loss. -Dysarthria (weakness or difficulty controlling the muscles you use for speech). -Associated weakness. -Ataxia (a lack of muscle coordination and control). -Drop attack (fainting spell). For 57243 Computed Tomography Angiography (CTA), a special kind of picture of the   blood vessels in your neck, without and with contrast (dye) Based on eviCore Head   Imaging Guidelines Section(s): Dizziness/Vertigo (HD 23.1), we cannot approve this   request. Your healthcare provider told us that you have dizzy spells. The request cannot   be approved because: You must have one of the following signs or symptoms suggestive of decreased blood   flow to the back or your brain.   -Vertigo (a sensation of feeling off balance) associated with nausea and vomiting. Diplopia (double vision). -Visual loss. -Dysarthria (weakness or difficulty controlling the muscles you use for speech). -Associated weakness. -Ataxia (a lack of muscle coordination and control). -Drop attack (fainting spell). Please provide the requested clinical information Before 1/19/2023. Please call [phone],   select the prompt associated with the request type and enter in reference number [id number] to   speak with a clinician about this request. You may fax additional clinical information to support   this request to [phone] for review. You may also schedule a Puwy-zm-Pcss discussion regarding the case. You may schedule a   discussion with a Medical Director by calling [phone], select the prompt associated with   the request type and enter in reference number [id number].  You also have the opportunity to   schedule a Peer to Peer discussion online at [email or web address]. Search for the   case by using the Authorization Lookup tab, and then go to P2P Availability, to schedule the   discussion.

## 2023-01-11 NOTE — TELEPHONE ENCOUNTER
Denial d/t   You must have one of the following signs or symptoms suggestive of decreased blood   flow to the back or your brain.   -Vertigo (a sensation of feeling off balance) associated with nausea and vomiting.   -Diplopia (double vision). -Visual loss. -Dysarthria (weakness or difficulty controlling the muscles you use for speech). -Associated weakness. -Ataxia (a lack of muscle coordination and control). -Drop attack (fainting spell).

## 2023-01-11 NOTE — TELEPHONE ENCOUNTER
Spoke with Nelia in the clinical review department and gave her new Dx Vertigo (R42) and that Dr Gustavo Galindo ordered the CTA to r/o vascular narrowing/stenosis. Nelia states she will give the updated information to the clinical director as Urgent. Left message on patient's VM (ok per HIPAA consent) with above information. Bhanu Abebe in radiology also notified of above.

## 2023-01-12 ENCOUNTER — LAB ENCOUNTER (OUTPATIENT)
Dept: LAB | Facility: HOSPITAL | Age: 63
End: 2023-01-12
Attending: Other
Payer: MEDICARE

## 2023-01-12 ENCOUNTER — HOSPITAL ENCOUNTER (OUTPATIENT)
Dept: CT IMAGING | Facility: HOSPITAL | Age: 63
Discharge: HOME | End: 2023-01-12
Attending: Other
Payer: MEDICARE

## 2023-01-12 DIAGNOSIS — R42 DIZZINESS: ICD-10-CM

## 2023-01-12 DIAGNOSIS — G62.9 PERIPHERAL NERVE DISORDER: Primary | ICD-10-CM

## 2023-01-12 DIAGNOSIS — G62.9 PERIPHERAL POLYNEUROPATHY: ICD-10-CM

## 2023-01-12 LAB
CREAT BLD-MCNC: 0.7 MG/DL
ERYTHROCYTE [SEDIMENTATION RATE] IN BLOOD: 5 MM/HR
FOLATE SERPL-MCNC: 43.9 NG/ML (ref 8.7–?)
GFR SERPLBLD BASED ON 1.73 SQ M-ARVRAT: 104 ML/MIN/1.73M2 (ref 60–?)
HBV SURFACE AG SER-ACNC: <0.1 [IU]/L
HBV SURFACE AG SERPL QL IA: NONREACTIVE
IGA SERPL-MCNC: 85.8 MG/DL (ref 70–312)
IGM SERPL-MCNC: 73.1 MG/DL (ref 43–279)
IMMUNOGLOBULIN PNL SER-MCNC: 949 MG/DL (ref 791–1643)
RHEUMATOID FACT SERPL-ACNC: <10 IU/ML (ref ?–15)
VIT B12 SERPL-MCNC: 669 PG/ML (ref 193–986)

## 2023-01-12 PROCEDURE — 84165 PROTEIN E-PHORESIS SERUM: CPT

## 2023-01-12 PROCEDURE — 36415 COLL VENOUS BLD VENIPUNCTURE: CPT

## 2023-01-12 PROCEDURE — 86431 RHEUMATOID FACTOR QUANT: CPT

## 2023-01-12 PROCEDURE — 86334 IMMUNOFIX E-PHORESIS SERUM: CPT

## 2023-01-12 PROCEDURE — 84425 ASSAY OF VITAMIN B-1: CPT

## 2023-01-12 PROCEDURE — 87340 HEPATITIS B SURFACE AG IA: CPT

## 2023-01-12 PROCEDURE — 82784 ASSAY IGA/IGD/IGG/IGM EACH: CPT

## 2023-01-12 PROCEDURE — 83521 IG LIGHT CHAINS FREE EACH: CPT

## 2023-01-12 PROCEDURE — 70498 CT ANGIOGRAPHY NECK: CPT | Performed by: OTHER

## 2023-01-12 PROCEDURE — 70496 CT ANGIOGRAPHY HEAD: CPT | Performed by: OTHER

## 2023-01-12 PROCEDURE — 82746 ASSAY OF FOLIC ACID SERUM: CPT

## 2023-01-12 PROCEDURE — 82565 ASSAY OF CREATININE: CPT

## 2023-01-12 PROCEDURE — 85652 RBC SED RATE AUTOMATED: CPT | Performed by: OTHER

## 2023-01-12 PROCEDURE — 83921 ORGANIC ACID SINGLE QUANT: CPT

## 2023-01-12 PROCEDURE — 82607 VITAMIN B-12: CPT

## 2023-01-16 LAB
KAPPA LC FREE SER-MCNC: 1.89 MG/DL (ref 0.33–1.94)
KAPPA LC FREE/LAMBDA FREE SER NEPH: 1.7 {RATIO} (ref 0.26–1.65)
LAMBDA LC FREE SERPL-MCNC: 1.11 MG/DL (ref 0.57–2.63)
MMA: 0.18 UMOL/L

## 2023-01-17 LAB
ALBUMIN SERPL ELPH-MCNC: 4.13 G/DL (ref 3.75–5.21)
ALBUMIN/GLOB SERPL: 1.49 {RATIO} (ref 1–2)
ALPHA1 GLOB SERPL ELPH-MCNC: 0.26 G/DL (ref 0.19–0.46)
ALPHA2 GLOB SERPL ELPH-MCNC: 1.08 G/DL (ref 0.48–1.05)
B-GLOBULIN SERPL ELPH-MCNC: 0.55 G/DL (ref 0.68–1.23)
GAMMA GLOB SERPL ELPH-MCNC: 0.88 G/DL (ref 0.62–1.7)
PROT SERPL-MCNC: 6.9 G/DL (ref 6.4–8.2)

## 2023-01-18 LAB — VITAMIN B1 (THIAMINE), WHOLE B: 158 NMOL/L

## 2023-01-30 ENCOUNTER — HOSPITAL ENCOUNTER (OUTPATIENT)
Dept: ULTRASOUND IMAGING | Age: 63
Discharge: HOME | End: 2023-01-30
Attending: SURGERY
Payer: MEDICARE

## 2023-01-30 DIAGNOSIS — I65.21 STENOSIS OF RIGHT CAROTID ARTERY: ICD-10-CM

## 2023-01-30 PROCEDURE — 93880 EXTRACRANIAL BILAT STUDY: CPT | Performed by: SURGERY

## 2023-03-06 ENCOUNTER — OFFICE VISIT (OUTPATIENT)
Dept: CARDIOLOGY | Age: 63
End: 2023-03-06

## 2023-03-06 VITALS
WEIGHT: 207.12 LBS | SYSTOLIC BLOOD PRESSURE: 120 MMHG | OXYGEN SATURATION: 97 % | RESPIRATION RATE: 16 BRPM | BODY MASS INDEX: 29.65 KG/M2 | HEIGHT: 70 IN | HEART RATE: 89 BPM | DIASTOLIC BLOOD PRESSURE: 73 MMHG

## 2023-03-06 DIAGNOSIS — I65.23 BILATERAL CAROTID ARTERY STENOSIS: Primary | ICD-10-CM

## 2023-03-06 DIAGNOSIS — E78.2 MIXED HYPERLIPIDEMIA: ICD-10-CM

## 2023-03-06 DIAGNOSIS — I10 PRIMARY HYPERTENSION: ICD-10-CM

## 2023-03-06 PROCEDURE — 99205 OFFICE O/P NEW HI 60 MIN: CPT | Performed by: INTERNAL MEDICINE

## 2023-03-06 PROCEDURE — 3078F DIAST BP <80 MM HG: CPT | Performed by: INTERNAL MEDICINE

## 2023-03-06 PROCEDURE — 3074F SYST BP LT 130 MM HG: CPT | Performed by: INTERNAL MEDICINE

## 2023-03-06 RX ORDER — ROSUVASTATIN CALCIUM 20 MG/1
20 TABLET, COATED ORAL DAILY
COMMUNITY
Start: 2023-02-17

## 2023-03-06 RX ORDER — FOLIC ACID 1 MG/1
TABLET ORAL EVERY 24 HOURS
COMMUNITY
Start: 2022-11-22

## 2023-03-06 RX ORDER — QUETIAPINE FUMARATE 50 MG/1
TABLET, FILM COATED ORAL EVERY 24 HOURS
Status: ON HOLD | COMMUNITY
End: 2023-03-22 | Stop reason: HOSPADM

## 2023-03-06 RX ORDER — PRAVASTATIN SODIUM 20 MG
TABLET ORAL
Status: ON HOLD | COMMUNITY
Start: 2022-10-01 | End: 2023-03-22 | Stop reason: HOSPADM

## 2023-03-06 RX ORDER — PRAVASTATIN SODIUM 10 MG
TABLET ORAL NIGHTLY
COMMUNITY
End: 2023-03-06

## 2023-03-06 RX ORDER — BUSPIRONE HYDROCHLORIDE 7.5 MG/1
7.5 TABLET ORAL 2 TIMES DAILY
COMMUNITY
Start: 2023-02-14

## 2023-03-06 RX ORDER — DULOXETIN HYDROCHLORIDE 60 MG/1
CAPSULE, DELAYED RELEASE ORAL EVERY 24 HOURS
Status: ON HOLD | COMMUNITY
Start: 2023-01-30 | End: 2023-03-22 | Stop reason: HOSPADM

## 2023-03-06 RX ORDER — CLONAZEPAM 2 MG/1
2 TABLET ORAL AT BEDTIME
COMMUNITY
Start: 2023-02-21

## 2023-03-06 RX ORDER — HYDROCODONE BITARTRATE AND ACETAMINOPHEN 5; 325 MG/1; MG/1
TABLET ORAL
Status: ON HOLD | COMMUNITY
Start: 2023-01-11 | End: 2023-03-22 | Stop reason: HOSPADM

## 2023-03-06 RX ORDER — CLOPIDOGREL BISULFATE 75 MG/1
TABLET ORAL DAILY
COMMUNITY
Start: 2023-01-24

## 2023-03-06 RX ORDER — FENOFIBRATE 160 MG/1
160 TABLET ORAL DAILY
Status: ON HOLD | COMMUNITY
End: 2023-03-22 | Stop reason: HOSPADM

## 2023-03-06 RX ORDER — LOSARTAN POTASSIUM 50 MG/1
50 TABLET ORAL DAILY
COMMUNITY
Start: 2023-02-23

## 2023-03-06 RX ORDER — PREGABALIN 100 MG/1
100 CAPSULE ORAL 2 TIMES DAILY
COMMUNITY
Start: 2023-02-22

## 2023-03-06 RX ORDER — AMITRIPTYLINE HYDROCHLORIDE 25 MG/1
25 TABLET, FILM COATED ORAL DAILY
Status: ON HOLD | COMMUNITY
End: 2023-03-22 | Stop reason: HOSPADM

## 2023-03-06 RX ORDER — CYCLOBENZAPRINE HCL 10 MG
TABLET ORAL
Status: ON HOLD | COMMUNITY
Start: 2022-12-01 | End: 2023-03-22 | Stop reason: HOSPADM

## 2023-03-06 RX ORDER — ATORVASTATIN CALCIUM 20 MG/1
TABLET, FILM COATED ORAL DAILY
Status: ON HOLD | COMMUNITY
Start: 2023-01-30 | End: 2023-03-22 | Stop reason: HOSPADM

## 2023-03-06 SDOH — HEALTH STABILITY: MENTAL HEALTH: LITTLE INTEREST OR PLEASURE IN ACTIVITY?: NOT AT ALL

## 2023-03-06 SDOH — HEALTH STABILITY: MENTAL HEALTH: DEPRESSION SCREENING SCORE: 0

## 2023-03-06 SDOH — HEALTH STABILITY: MENTAL HEALTH: FEELING DOWN, DEPRESSED OR HOPELESS?: NOT AT ALL

## 2023-03-06 SDOH — HEALTH STABILITY: PHYSICAL HEALTH: ON AVERAGE, HOW MANY MINUTES DO YOU ENGAGE IN EXERCISE AT THIS LEVEL?: 120 MIN

## 2023-03-06 SDOH — HEALTH STABILITY: PHYSICAL HEALTH: ON AVERAGE, HOW MANY DAYS PER WEEK DO YOU ENGAGE IN MODERATE TO STRENUOUS EXERCISE (LIKE A BRISK WALK)?: 5 DAYS

## 2023-03-06 SDOH — HEALTH STABILITY: MENTAL HEALTH: PHQ2 INTERPRETATION: NO FURTHER SCREENING NEEDED

## 2023-03-06 ASSESSMENT — PATIENT HEALTH QUESTIONNAIRE - PHQ9: SUM OF ALL RESPONSES TO PHQ9 QUESTIONS 1 AND 2: 0

## 2023-03-07 PROBLEM — I65.23 BILATERAL CAROTID ARTERY STENOSIS: Status: ACTIVE | Noted: 2023-03-07

## 2023-03-07 PROBLEM — I10 PRIMARY HYPERTENSION: Status: ACTIVE | Noted: 2023-03-07

## 2023-03-07 PROBLEM — E78.2 MIXED HYPERLIPIDEMIA: Status: ACTIVE | Noted: 2023-03-07

## 2023-03-08 ENCOUNTER — OFFICE VISIT (OUTPATIENT)
Dept: NEUROLOGY | Facility: CLINIC | Age: 63
End: 2023-03-08
Payer: MEDICARE

## 2023-03-08 VITALS — WEIGHT: 210.63 LBS | BODY MASS INDEX: 30 KG/M2

## 2023-03-08 DIAGNOSIS — R26.9 GAIT DISORDER: Primary | ICD-10-CM

## 2023-03-10 ENCOUNTER — APPOINTMENT (OUTPATIENT)
Dept: CARDIOLOGY | Age: 63
End: 2023-03-10

## 2023-03-10 ENCOUNTER — EXTERNAL RECORD (OUTPATIENT)
Dept: HEALTH INFORMATION MANAGEMENT | Facility: OTHER | Age: 63
End: 2023-03-10

## 2023-03-14 ENCOUNTER — APPOINTMENT (OUTPATIENT)
Dept: GENERAL RADIOLOGY | Facility: HOSPITAL | Age: 63
End: 2023-03-14
Payer: MEDICARE

## 2023-03-14 ENCOUNTER — HOSPITAL ENCOUNTER (EMERGENCY)
Facility: HOSPITAL | Age: 63
Discharge: LEFT BEFORE DISCHARGE | End: 2023-03-14
Payer: MEDICARE

## 2023-03-14 VITALS
SYSTOLIC BLOOD PRESSURE: 113 MMHG | HEART RATE: 73 BPM | OXYGEN SATURATION: 95 % | RESPIRATION RATE: 14 BRPM | WEIGHT: 209.44 LBS | TEMPERATURE: 98 F | BODY MASS INDEX: 30 KG/M2 | DIASTOLIC BLOOD PRESSURE: 69 MMHG

## 2023-03-14 LAB
ALBUMIN SERPL-MCNC: 3.8 G/DL (ref 3.4–5)
ALBUMIN/GLOB SERPL: 1.2 {RATIO} (ref 1–2)
ALP LIVER SERPL-CCNC: 83 U/L
ALT SERPL-CCNC: 40 U/L
ANION GAP SERPL CALC-SCNC: 5 MMOL/L (ref 0–18)
AST SERPL-CCNC: 29 U/L (ref 15–37)
ATRIAL RATE: 71 BPM
BASOPHILS # BLD AUTO: 0.03 X10(3) UL (ref 0–0.2)
BASOPHILS NFR BLD AUTO: 0.5 %
BILIRUB SERPL-MCNC: 0.2 MG/DL (ref 0.1–2)
BUN BLD-MCNC: 19 MG/DL (ref 7–18)
CALCIUM BLD-MCNC: 9.2 MG/DL (ref 8.5–10.1)
CHLORIDE SERPL-SCNC: 106 MMOL/L (ref 98–112)
CO2 SERPL-SCNC: 29 MMOL/L (ref 21–32)
CREAT BLD-MCNC: 0.84 MG/DL
EOSINOPHIL # BLD AUTO: 0.29 X10(3) UL (ref 0–0.7)
EOSINOPHIL NFR BLD AUTO: 4.7 %
ERYTHROCYTE [DISTWIDTH] IN BLOOD BY AUTOMATED COUNT: 14.9 %
GFR SERPLBLD BASED ON 1.73 SQ M-ARVRAT: 98 ML/MIN/1.73M2 (ref 60–?)
GLOBULIN PLAS-MCNC: 3.2 G/DL (ref 2.8–4.4)
GLUCOSE BLD-MCNC: 91 MG/DL (ref 70–99)
HCT VFR BLD AUTO: 42.6 %
HGB BLD-MCNC: 13.8 G/DL
IMM GRANULOCYTES # BLD AUTO: 0.02 X10(3) UL (ref 0–1)
IMM GRANULOCYTES NFR BLD: 0.3 %
LYMPHOCYTES # BLD AUTO: 2.16 X10(3) UL (ref 1–4)
LYMPHOCYTES NFR BLD AUTO: 35.2 %
MCH RBC QN AUTO: 30.7 PG (ref 26–34)
MCHC RBC AUTO-ENTMCNC: 32.4 G/DL (ref 31–37)
MCV RBC AUTO: 94.7 FL
MONOCYTES # BLD AUTO: 0.58 X10(3) UL (ref 0.1–1)
MONOCYTES NFR BLD AUTO: 9.5 %
NEUTROPHILS # BLD AUTO: 3.05 X10 (3) UL (ref 1.5–7.7)
NEUTROPHILS # BLD AUTO: 3.05 X10(3) UL (ref 1.5–7.7)
NEUTROPHILS NFR BLD AUTO: 49.8 %
OSMOLALITY SERPL CALC.SUM OF ELEC: 292 MOSM/KG (ref 275–295)
P AXIS: 46 DEGREES
P-R INTERVAL: 140 MS
PLATELET # BLD AUTO: 196 10(3)UL (ref 150–450)
POTASSIUM SERPL-SCNC: 4.2 MMOL/L (ref 3.5–5.1)
PROT SERPL-MCNC: 7 G/DL (ref 6.4–8.2)
Q-T INTERVAL: 406 MS
QRS DURATION: 98 MS
QTC CALCULATION (BEZET): 441 MS
R AXIS: 22 DEGREES
RBC # BLD AUTO: 4.5 X10(6)UL
SODIUM SERPL-SCNC: 140 MMOL/L (ref 136–145)
T AXIS: 21 DEGREES
TROPONIN I HIGH SENSITIVITY: 8 NG/L
VENTRICULAR RATE: 71 BPM
WBC # BLD AUTO: 6.1 X10(3) UL (ref 4–11)

## 2023-03-14 PROCEDURE — 84484 ASSAY OF TROPONIN QUANT: CPT

## 2023-03-14 PROCEDURE — 80053 COMPREHEN METABOLIC PANEL: CPT

## 2023-03-14 PROCEDURE — 85025 COMPLETE CBC W/AUTO DIFF WBC: CPT

## 2023-03-14 PROCEDURE — 93005 ELECTROCARDIOGRAM TRACING: CPT

## 2023-03-14 PROCEDURE — 71046 X-RAY EXAM CHEST 2 VIEWS: CPT

## 2023-03-14 NOTE — ED INITIAL ASSESSMENT (HPI)
Pt presents to ed with chest pain for a few months with no relief with medication, pt was seen by primary for same complaint

## 2023-03-15 ENCOUNTER — TELEPHONE (OUTPATIENT)
Dept: CARDIOLOGY | Age: 63
End: 2023-03-15

## 2023-03-15 RX ORDER — ASPIRIN 325 MG
325 TABLET ORAL DAILY
Status: ON HOLD | COMMUNITY
End: 2023-03-22 | Stop reason: HOSPADM

## 2023-03-16 ENCOUNTER — PREP FOR CASE (OUTPATIENT)
Dept: CARDIOLOGY | Age: 63
End: 2023-03-16

## 2023-03-16 DIAGNOSIS — I65.21 CAROTID STENOSIS, RIGHT: Primary | ICD-10-CM

## 2023-03-20 ENCOUNTER — TELEPHONE (OUTPATIENT)
Dept: CARDIOLOGY | Age: 63
End: 2023-03-20

## 2023-03-21 ENCOUNTER — HOSPITAL ENCOUNTER (INPATIENT)
Age: 63
LOS: 1 days | Discharge: HOME | DRG: 034 | End: 2023-03-22
Attending: SURGERY | Admitting: SURGERY

## 2023-03-21 ENCOUNTER — ANESTHESIA EVENT (OUTPATIENT)
Dept: CARDIOLOGY | Age: 63
DRG: 034 | End: 2023-03-21

## 2023-03-21 ENCOUNTER — ANESTHESIA (OUTPATIENT)
Dept: CARDIOLOGY | Age: 63
DRG: 034 | End: 2023-03-21

## 2023-03-21 DIAGNOSIS — I65.21 CAROTID STENOSIS, RIGHT: ICD-10-CM

## 2023-03-21 LAB
ABO + RH BLD: NORMAL
ACT BLD: 160 BASELINE/TARGET RANGES ARE SET BY CLINICIANS FOR EACH PATIENT/PROCEDURE
ACT BLD: 382 BASELINE/TARGET RANGES ARE SET BY CLINICIANS FOR EACH PATIENT/PROCEDURE
ALBUMIN SERPL-MCNC: 3.9 G/DL (ref 3.6–5.1)
ALBUMIN/GLOB SERPL: 1.3 {RATIO} (ref 1–2.4)
ALP SERPL-CCNC: 76 UNITS/L (ref 45–117)
ALT SERPL-CCNC: 57 UNITS/L
ANION GAP SERPL CALC-SCNC: 8 MMOL/L (ref 7–19)
APTT PPP: 29 SEC (ref 22–30)
AST SERPL-CCNC: 32 UNITS/L
ATRIAL RATE (BPM): 83
BASOPHILS # BLD: 0 K/MCL (ref 0–0.3)
BASOPHILS NFR BLD: 1 %
BILIRUB SERPL-MCNC: 0.4 MG/DL (ref 0.2–1)
BLD GP AB SCN SERPL QL GEL: NEGATIVE
BUN SERPL-MCNC: 24 MG/DL (ref 6–20)
BUN/CREAT SERPL: 32 (ref 7–25)
CALCIUM SERPL-MCNC: 9.1 MG/DL (ref 8.4–10.2)
CHLORIDE SERPL-SCNC: 105 MMOL/L (ref 97–110)
CO2 SERPL-SCNC: 29 MMOL/L (ref 21–32)
CREAT SERPL-MCNC: 0.75 MG/DL (ref 0.67–1.17)
DEPRECATED RDW RBC: 52.7 FL (ref 39–50)
EOSINOPHIL # BLD: 0.3 K/MCL (ref 0–0.5)
EOSINOPHIL NFR BLD: 5 %
ERYTHROCYTE [DISTWIDTH] IN BLOOD: 14.7 % (ref 11–15)
FASTING DURATION TIME PATIENT: ABNORMAL H
GFR SERPLBLD BASED ON 1.73 SQ M-ARVRAT: >90 ML/MIN
GLOBULIN SER-MCNC: 3.1 G/DL (ref 2–4)
GLUCOSE BLDC GLUCOMTR-MCNC: 71 MG/DL (ref 70–99)
GLUCOSE SERPL-MCNC: 96 MG/DL (ref 70–99)
HCT VFR BLD CALC: 44.2 % (ref 39–51)
HGB BLD-MCNC: 14.4 G/DL (ref 13–17)
IMM GRANULOCYTES # BLD AUTO: 0 K/MCL (ref 0–0.2)
IMM GRANULOCYTES # BLD: 0 %
INR PPP: 1
LYMPHOCYTES # BLD: 1.9 K/MCL (ref 1–4)
LYMPHOCYTES NFR BLD: 32 %
MCH RBC QN AUTO: 31.2 PG (ref 26–34)
MCHC RBC AUTO-ENTMCNC: 32.6 G/DL (ref 32–36.5)
MCV RBC AUTO: 95.9 FL (ref 78–100)
MONOCYTES # BLD: 0.5 K/MCL (ref 0.3–0.9)
MONOCYTES NFR BLD: 8 %
NEUTROPHILS # BLD: 3.3 K/MCL (ref 1.8–7.7)
NEUTROPHILS NFR BLD: 54 %
NRBC BLD MANUAL-RTO: 0 /100 WBC
PLATELET # BLD AUTO: 186 K/MCL (ref 140–450)
POTASSIUM SERPL-SCNC: 4.1 MMOL/L (ref 3.4–5.1)
PR-INTERVAL (MSEC): 154
PROT SERPL-MCNC: 7 G/DL (ref 6.4–8.2)
PROTHROMBIN TIME: 10.5 SEC (ref 9.7–11.8)
QRS-INTERVAL (MSEC): 90
QT-INTERVAL (MSEC): 376
QTC: 442
R AXIS (DEGREES): 45
RBC # BLD: 4.61 MIL/MCL (ref 4.5–5.9)
REPORT TEXT: NORMAL
SODIUM SERPL-SCNC: 138 MMOL/L (ref 135–145)
T AXIS (DEGREES): 51
TYPE AND SCREEN EXPIRATION DATE: NORMAL
VENTRICULAR RATE EKG/MIN (BPM): 83
WBC # BLD: 6 K/MCL (ref 4.2–11)

## 2023-03-21 PROCEDURE — P9045 ALBUMIN (HUMAN), 5%, 250 ML: HCPCS | Performed by: SURGERY

## 2023-03-21 PROCEDURE — 13001086 HB INCENTIVE SPIROMETER W INSTRUCT

## 2023-03-21 PROCEDURE — 37215 TRANSCATH STENT CCA W/EPS: CPT | Performed by: SURGERY

## 2023-03-21 PROCEDURE — 10002803 HB RX 637: Performed by: SURGERY

## 2023-03-21 PROCEDURE — 99222 1ST HOSP IP/OBS MODERATE 55: CPT | Performed by: INTERNAL MEDICINE

## 2023-03-21 PROCEDURE — 10002800 HB RX 250 W HCPCS: Performed by: NURSE ANESTHETIST, CERTIFIED REGISTERED

## 2023-03-21 PROCEDURE — 10004651 HB RX, NO CHARGE ITEM: Performed by: INTERNAL MEDICINE

## 2023-03-21 PROCEDURE — 10004180 HB COUNTER-TRANSPORT

## 2023-03-21 PROCEDURE — 85347 COAGULATION TIME ACTIVATED: CPT | Performed by: SURGERY

## 2023-03-21 PROCEDURE — 10002807 HB RX 258: Performed by: NURSE ANESTHETIST, CERTIFIED REGISTERED

## 2023-03-21 PROCEDURE — 10002805 HB CONTRAST AGENT: Performed by: SURGERY

## 2023-03-21 PROCEDURE — 37215 TRANSCATH STENT CCA W/EPS: CPT | Performed by: INTERNAL MEDICINE

## 2023-03-21 PROCEDURE — 10004452 HB PACU ADDL 30 MINUTES: Performed by: INTERNAL MEDICINE

## 2023-03-21 PROCEDURE — 037K3DZ DILATION OF RIGHT INTERNAL CAROTID ARTERY WITH INTRALUMINAL DEVICE, PERCUTANEOUS APPROACH: ICD-10-PCS | Performed by: SURGERY

## 2023-03-21 PROCEDURE — 13000004 HB  ANESTHESIA  GENERAL OUTSIDE OR: Performed by: INTERNAL MEDICINE

## 2023-03-21 PROCEDURE — 80053 COMPREHEN METABOLIC PANEL: CPT | Performed by: SURGERY

## 2023-03-21 PROCEDURE — 10004651 HB RX, NO CHARGE ITEM: Performed by: SURGERY

## 2023-03-21 PROCEDURE — 85730 THROMBOPLASTIN TIME PARTIAL: CPT | Performed by: SURGERY

## 2023-03-21 PROCEDURE — 10002800 HB RX 250 W HCPCS: Performed by: SURGERY

## 2023-03-21 PROCEDURE — C1725 CATH, TRANSLUMIN NON-LASER: HCPCS | Performed by: INTERNAL MEDICINE

## 2023-03-21 PROCEDURE — 36415 COLL VENOUS BLD VENIPUNCTURE: CPT | Performed by: SURGERY

## 2023-03-21 PROCEDURE — 10004451 HB PACU RECOVERY 1ST 30 MINUTES: Performed by: INTERNAL MEDICINE

## 2023-03-21 PROCEDURE — 85025 COMPLETE CBC W/AUTO DIFF WBC: CPT | Performed by: SURGERY

## 2023-03-21 PROCEDURE — 10002807 HB RX 258: Performed by: INTERNAL MEDICINE

## 2023-03-21 PROCEDURE — 93005 ELECTROCARDIOGRAM TRACING: CPT | Performed by: SURGERY

## 2023-03-21 PROCEDURE — 10002801 HB RX 250 W/O HCPCS: Performed by: NURSE ANESTHETIST, CERTIFIED REGISTERED

## 2023-03-21 PROCEDURE — 10000008 HB ROOM CHARGE ICU OR CCU

## 2023-03-21 PROCEDURE — 10006027 HB SUPPLY 278: Performed by: INTERNAL MEDICINE

## 2023-03-21 PROCEDURE — C1876 STENT, NON-COA/NON-COV W/DEL: HCPCS | Performed by: INTERNAL MEDICINE

## 2023-03-21 PROCEDURE — 76937 US GUIDE VASCULAR ACCESS: CPT | Performed by: INTERNAL MEDICINE

## 2023-03-21 PROCEDURE — 10002016 HB COUNTER INCENTIVE SPIROMETRY

## 2023-03-21 PROCEDURE — 10006023 HB SUPPLY 272: Performed by: INTERNAL MEDICINE

## 2023-03-21 PROCEDURE — 10002807 HB RX 258: Performed by: SURGERY

## 2023-03-21 PROCEDURE — 86901 BLOOD TYPING SEROLOGIC RH(D): CPT | Performed by: SURGERY

## 2023-03-21 PROCEDURE — C1894 INTRO/SHEATH, NON-LASER: HCPCS | Performed by: INTERNAL MEDICINE

## 2023-03-21 PROCEDURE — 93010 ELECTROCARDIOGRAM REPORT: CPT | Performed by: SPECIALIST

## 2023-03-21 PROCEDURE — 85610 PROTHROMBIN TIME: CPT | Performed by: SURGERY

## 2023-03-21 PROCEDURE — C1769 GUIDE WIRE: HCPCS | Performed by: INTERNAL MEDICINE

## 2023-03-21 PROCEDURE — 99291 CRITICAL CARE FIRST HOUR: CPT | Performed by: SURGERY

## 2023-03-21 PROCEDURE — C1760 CLOSURE DEV, VASC: HCPCS | Performed by: INTERNAL MEDICINE

## 2023-03-21 DEVICE — HORIZON TI SMALL RED  24 CLIPS/POUCH
Type: IMPLANTABLE DEVICE | Site: EXTERNAL CAROTID ARTERY | Status: FUNCTIONAL
Brand: WECK

## 2023-03-21 DEVICE — CLIP INTERNAL MED CHEVRON 24 CRTDG LIGATE TRIANGULATE XSECT: Type: IMPLANTABLE DEVICE | Site: EXTERNAL CAROTID ARTERY | Status: FUNCTIONAL

## 2023-03-21 DEVICE — 8 MM X 30 MM
Type: IMPLANTABLE DEVICE | Site: CAROTID ARTERY | Status: FUNCTIONAL
Brand: ENROUTE TRANSCAROTID STENT

## 2023-03-21 DEVICE — THE VASCADE MVP VENOUS VASCULAR CLOSURE SYSTEM (VVCS) IS INTENDED TO SEAL FEMORAL VEINS WITH SINGLE OR MULTIPLE ACCESS SITES IN ONE OR BOTH LIMBS AT THE COMPLETION OF CATHETERIZATION PROCEDURES. THE SYSTEM IS DESIGNED TO DELIVER A RESORBABLE COLLAGEN PATCH, EXTRA-VASCULARLY, AT VESSEL PUNCTURE SITES TO ACHIEVE HEMOSTASIS. FOR USE WITH 6FR TO 12FR (15F MAXIMUM OUTER DIAMETER) INTRODUCER SHEATHS; OVERALL LENGTH OF THE SHEATH (INCLUDING THE HUB) NEEDS TO BE LESS THAN 15CM.
Type: IMPLANTABLE DEVICE | Site: FEMORAL VEIN | Status: FUNCTIONAL
Brand: CARDIVA VASCADE MVP VVCS 6-12F

## 2023-03-21 RX ORDER — SODIUM CHLORIDE 9 MG/ML
INJECTION, SOLUTION INTRAVENOUS CONTINUOUS
Status: DISCONTINUED | OUTPATIENT
Start: 2023-03-21 | End: 2023-03-22 | Stop reason: HOSPADM

## 2023-03-21 RX ORDER — 0.9 % SODIUM CHLORIDE 0.9 %
2 VIAL (ML) INJECTION EVERY 12 HOURS SCHEDULED
Status: DISCONTINUED | OUTPATIENT
Start: 2023-03-21 | End: 2023-03-22 | Stop reason: HOSPADM

## 2023-03-21 RX ORDER — TRAMADOL HYDROCHLORIDE 50 MG/1
50 TABLET ORAL EVERY 6 HOURS PRN
Status: DISCONTINUED | OUTPATIENT
Start: 2023-03-21 | End: 2023-03-22 | Stop reason: HOSPADM

## 2023-03-21 RX ORDER — ALBUMIN, HUMAN INJ 5% 5 %
12.5 SOLUTION INTRAVENOUS ONCE
Status: COMPLETED | OUTPATIENT
Start: 2023-03-21 | End: 2023-03-22

## 2023-03-21 RX ORDER — SODIUM CHLORIDE 9 MG/ML
INJECTION, SOLUTION INTRAVENOUS CONTINUOUS
Status: ACTIVE | OUTPATIENT
Start: 2023-03-21 | End: 2023-03-21

## 2023-03-21 RX ORDER — LIDOCAINE HYDROCHLORIDE 20 MG/ML
INJECTION, SOLUTION INFILTRATION; PERINEURAL PRN
Status: DISCONTINUED | OUTPATIENT
Start: 2023-03-21 | End: 2023-03-21

## 2023-03-21 RX ORDER — GLYCOPYRROLATE 0.2 MG/ML
INJECTION, SOLUTION INTRAMUSCULAR; INTRAVENOUS PRN
Status: DISCONTINUED | OUTPATIENT
Start: 2023-03-21 | End: 2023-03-21

## 2023-03-21 RX ORDER — ALBUMIN, HUMAN INJ 5% 5 %
12.5 SOLUTION INTRAVENOUS ONCE
Status: DISCONTINUED | OUTPATIENT
Start: 2023-03-21 | End: 2023-03-21

## 2023-03-21 RX ORDER — SODIUM CHLORIDE, SODIUM LACTATE, POTASSIUM CHLORIDE, CALCIUM CHLORIDE 600; 310; 30; 20 MG/100ML; MG/100ML; MG/100ML; MG/100ML
INJECTION, SOLUTION INTRAVENOUS CONTINUOUS PRN
Status: DISCONTINUED | OUTPATIENT
Start: 2023-03-21 | End: 2023-03-21

## 2023-03-21 RX ORDER — ROCURONIUM BROMIDE 10 MG/ML
INJECTION, SOLUTION INTRAVENOUS PRN
Status: DISCONTINUED | OUTPATIENT
Start: 2023-03-21 | End: 2023-03-21

## 2023-03-21 RX ORDER — PROPOFOL 10 MG/ML
INJECTION, EMULSION INTRAVENOUS PRN
Status: DISCONTINUED | OUTPATIENT
Start: 2023-03-21 | End: 2023-03-21

## 2023-03-21 RX ORDER — CLOPIDOGREL BISULFATE 75 MG/1
75 TABLET ORAL DAILY
Status: DISCONTINUED | OUTPATIENT
Start: 2023-03-22 | End: 2023-03-21

## 2023-03-21 RX ORDER — HEPARIN SODIUM 5000 [USP'U]/ML
5000 INJECTION, SOLUTION INTRAVENOUS; SUBCUTANEOUS EVERY 8 HOURS SCHEDULED
Status: DISCONTINUED | OUTPATIENT
Start: 2023-03-21 | End: 2023-03-22 | Stop reason: HOSPADM

## 2023-03-21 RX ORDER — CLOPIDOGREL BISULFATE 75 MG/1
75 TABLET ORAL DAILY
Status: DISCONTINUED | OUTPATIENT
Start: 2023-03-22 | End: 2023-03-22 | Stop reason: HOSPADM

## 2023-03-21 RX ORDER — ACETAMINOPHEN 325 MG/1
650 TABLET ORAL EVERY 4 HOURS PRN
Status: DISCONTINUED | OUTPATIENT
Start: 2023-03-21 | End: 2023-03-22 | Stop reason: HOSPADM

## 2023-03-21 RX ORDER — SODIUM CHLORIDE 9 MG/ML
INJECTION, SOLUTION INTRAVENOUS CONTINUOUS PRN
Status: DISCONTINUED | OUTPATIENT
Start: 2023-03-21 | End: 2023-03-22 | Stop reason: HOSPADM

## 2023-03-21 RX ORDER — NEOSTIGMINE METHYLSULFATE 4 MG/4 ML
SYRINGE (ML) INTRAVENOUS PRN
Status: DISCONTINUED | OUTPATIENT
Start: 2023-03-21 | End: 2023-03-21

## 2023-03-21 RX ORDER — MIDAZOLAM HYDROCHLORIDE 1 MG/ML
INJECTION, SOLUTION INTRAMUSCULAR; INTRAVENOUS PRN
Status: DISCONTINUED | OUTPATIENT
Start: 2023-03-21 | End: 2023-03-21

## 2023-03-21 RX ORDER — ONDANSETRON 2 MG/ML
4 INJECTION INTRAMUSCULAR; INTRAVENOUS
Status: DISCONTINUED | OUTPATIENT
Start: 2023-03-21 | End: 2023-03-21

## 2023-03-21 RX ORDER — HEPARIN SODIUM 1000 [USP'U]/ML
INJECTION, SOLUTION INTRAVENOUS; SUBCUTANEOUS PRN
Status: DISCONTINUED | OUTPATIENT
Start: 2023-03-21 | End: 2023-03-21

## 2023-03-21 RX ORDER — DROPERIDOL 2.5 MG/ML
0.62 INJECTION, SOLUTION INTRAMUSCULAR; INTRAVENOUS
Status: DISCONTINUED | OUTPATIENT
Start: 2023-03-21 | End: 2023-03-21

## 2023-03-21 RX ORDER — PROTAMINE SULFATE 10 MG/ML
INJECTION, SOLUTION INTRAVENOUS PRN
Status: DISCONTINUED | OUTPATIENT
Start: 2023-03-21 | End: 2023-03-21

## 2023-03-21 RX ORDER — ONDANSETRON 2 MG/ML
INJECTION INTRAMUSCULAR; INTRAVENOUS PRN
Status: DISCONTINUED | OUTPATIENT
Start: 2023-03-21 | End: 2023-03-21

## 2023-03-21 RX ORDER — DIPHENHYDRAMINE HYDROCHLORIDE 50 MG/ML
12.5 INJECTION INTRAMUSCULAR; INTRAVENOUS
Status: DISCONTINUED | OUTPATIENT
Start: 2023-03-21 | End: 2023-03-21

## 2023-03-21 RX ORDER — IODIXANOL 320 MG/ML
INJECTION, SOLUTION INTRAVASCULAR PRN
Status: DISCONTINUED | OUTPATIENT
Start: 2023-03-21 | End: 2023-03-21 | Stop reason: HOSPADM

## 2023-03-21 RX ORDER — SODIUM CHLORIDE, SODIUM LACTATE, POTASSIUM CHLORIDE, CALCIUM CHLORIDE 600; 310; 30; 20 MG/100ML; MG/100ML; MG/100ML; MG/100ML
INJECTION, SOLUTION INTRAVENOUS CONTINUOUS
Status: DISCONTINUED | OUTPATIENT
Start: 2023-03-21 | End: 2023-03-21 | Stop reason: HOSPADM

## 2023-03-21 RX ORDER — ROSUVASTATIN CALCIUM 20 MG/1
20 TABLET, COATED ORAL DAILY
Status: DISCONTINUED | OUTPATIENT
Start: 2023-03-22 | End: 2023-03-22 | Stop reason: HOSPADM

## 2023-03-21 RX ORDER — ALBUMIN, HUMAN INJ 5% 5 %
12.5 SOLUTION INTRAVENOUS ONCE
Status: COMPLETED | OUTPATIENT
Start: 2023-03-21 | End: 2023-03-21

## 2023-03-21 RX ORDER — CLONAZEPAM 1 MG/1
2 TABLET ORAL NIGHTLY
Status: DISCONTINUED | OUTPATIENT
Start: 2023-03-21 | End: 2023-03-22 | Stop reason: HOSPADM

## 2023-03-21 RX ORDER — NITROGLYCERIN 0.4 MG/1
0.4 TABLET SUBLINGUAL EVERY 5 MIN PRN
Status: DISCONTINUED | OUTPATIENT
Start: 2023-03-21 | End: 2023-03-22 | Stop reason: HOSPADM

## 2023-03-21 RX ORDER — 0.9 % SODIUM CHLORIDE 0.9 %
2 VIAL (ML) INJECTION EVERY 12 HOURS SCHEDULED
Status: DISCONTINUED | OUTPATIENT
Start: 2023-03-21 | End: 2023-03-21 | Stop reason: HOSPADM

## 2023-03-21 RX ORDER — SODIUM CHLORIDE 9 MG/ML
INJECTION, SOLUTION INTRAVENOUS CONTINUOUS PRN
Status: DISCONTINUED | OUTPATIENT
Start: 2023-03-21 | End: 2023-03-21

## 2023-03-21 RX ORDER — HYDRALAZINE HYDROCHLORIDE 20 MG/ML
10 INJECTION INTRAMUSCULAR; INTRAVENOUS EVERY 4 HOURS PRN
Status: DISCONTINUED | OUTPATIENT
Start: 2023-03-21 | End: 2023-03-21

## 2023-03-21 RX ORDER — ACETAMINOPHEN 650 MG/1
650 SUPPOSITORY RECTAL EVERY 4 HOURS PRN
Status: DISCONTINUED | OUTPATIENT
Start: 2023-03-21 | End: 2023-03-22 | Stop reason: HOSPADM

## 2023-03-21 RX ORDER — SODIUM CHLORIDE 9 MG/ML
INJECTION, SOLUTION INTRAVENOUS CONTINUOUS
Status: DISCONTINUED | OUTPATIENT
Start: 2023-03-21 | End: 2023-03-21 | Stop reason: HOSPADM

## 2023-03-21 RX ORDER — EPHEDRINE SULFATE/0.9% NACL/PF 50 MG/10ML
SYRINGE (ML) INTRAVENOUS PRN
Status: DISCONTINUED | OUTPATIENT
Start: 2023-03-21 | End: 2023-03-21

## 2023-03-21 RX ORDER — HYDRALAZINE HYDROCHLORIDE 20 MG/ML
5 INJECTION INTRAMUSCULAR; INTRAVENOUS EVERY 10 MIN PRN
Status: DISCONTINUED | OUTPATIENT
Start: 2023-03-21 | End: 2023-03-21 | Stop reason: HOSPADM

## 2023-03-21 RX ORDER — ASPIRIN 81 MG/1
81 TABLET, CHEWABLE ORAL DAILY
COMMUNITY

## 2023-03-21 RX ORDER — CLONIDINE HYDROCHLORIDE 0.1 MG/1
0.1 TABLET ORAL EVERY 4 HOURS PRN
Status: DISCONTINUED | OUTPATIENT
Start: 2023-03-21 | End: 2023-03-21

## 2023-03-21 RX ORDER — HYDRALAZINE HYDROCHLORIDE 20 MG/ML
10 INJECTION INTRAMUSCULAR; INTRAVENOUS EVERY 4 HOURS PRN
Status: DISCONTINUED | OUTPATIENT
Start: 2023-03-21 | End: 2023-03-22 | Stop reason: HOSPADM

## 2023-03-21 RX ADMIN — TRAMADOL HYDROCHLORIDE 50 MG: 50 TABLET, COATED ORAL at 22:14

## 2023-03-21 RX ADMIN — EPHEDRINE SULFATE 10 MG: 5 INJECTION INTRAVENOUS at 13:43

## 2023-03-21 RX ADMIN — LIDOCAINE HYDROCHLORIDE 3 ML: 20 INJECTION, SOLUTION INFILTRATION; PERINEURAL at 12:57

## 2023-03-21 RX ADMIN — SODIUM CHLORIDE 100 ML: 9 INJECTION, SOLUTION INTRAVENOUS at 15:06

## 2023-03-21 RX ADMIN — SODIUM CHLORIDE: 9 INJECTION, SOLUTION INTRAVENOUS at 16:22

## 2023-03-21 RX ADMIN — EPHEDRINE SULFATE 10 MG: 5 INJECTION INTRAVENOUS at 14:34

## 2023-03-21 RX ADMIN — SODIUM CHLORIDE, PRESERVATIVE FREE 2 ML: 5 INJECTION INTRAVENOUS at 20:34

## 2023-03-21 RX ADMIN — EPHEDRINE SULFATE 10 MG: 5 INJECTION INTRAVENOUS at 13:24

## 2023-03-21 RX ADMIN — EPHEDRINE SULFATE 10 MG: 5 INJECTION INTRAVENOUS at 14:38

## 2023-03-21 RX ADMIN — Medication 4.5 MG: at 13:57

## 2023-03-21 RX ADMIN — ONDANSETRON 4 MG: 2 INJECTION INTRAMUSCULAR; INTRAVENOUS at 13:57

## 2023-03-21 RX ADMIN — ALBUMIN HUMAN 12.5 G: 0.05 INJECTION, SOLUTION INTRAVENOUS at 22:14

## 2023-03-21 RX ADMIN — SODIUM CHLORIDE: 9 INJECTION, SOLUTION INTRAVENOUS at 12:48

## 2023-03-21 RX ADMIN — EPHEDRINE SULFATE 10 MG: 5 INJECTION INTRAVENOUS at 14:30

## 2023-03-21 RX ADMIN — ACETAMINOPHEN 650 MG: 325 TABLET ORAL at 20:34

## 2023-03-21 RX ADMIN — CLONAZEPAM 2 MG: 1 TABLET ORAL at 23:06

## 2023-03-21 RX ADMIN — HEPARIN SODIUM 5000 UNITS: 5000 INJECTION, SOLUTION INTRAVENOUS; SUBCUTANEOUS at 22:14

## 2023-03-21 RX ADMIN — SODIUM CHLORIDE: 9 INJECTION, SOLUTION INTRAVENOUS at 22:19

## 2023-03-21 RX ADMIN — HEPARIN SODIUM 10000 UNITS: 1000 INJECTION, SOLUTION INTRAVENOUS; SUBCUTANEOUS at 13:30

## 2023-03-21 RX ADMIN — TRAMADOL HYDROCHLORIDE 50 MG: 50 TABLET, COATED ORAL at 16:18

## 2023-03-21 RX ADMIN — PROTAMINE SULFATE 50 MG: 10 INJECTION, SOLUTION INTRAVENOUS at 13:56

## 2023-03-21 RX ADMIN — MIDAZOLAM HYDROCHLORIDE 2 MG: 1 INJECTION, SOLUTION INTRAMUSCULAR; INTRAVENOUS at 12:15

## 2023-03-21 RX ADMIN — PHENYLEPHRINE HYDROCHLORIDE 20 MCG/MIN: 10 INJECTION, SOLUTION INTRAVENOUS at 13:07

## 2023-03-21 RX ADMIN — SODIUM CHLORIDE: 9 INJECTION, SOLUTION INTRAVENOUS at 12:00

## 2023-03-21 RX ADMIN — EPHEDRINE SULFATE 10 MG: 5 INJECTION INTRAVENOUS at 14:23

## 2023-03-21 RX ADMIN — PROPOFOL 170 MG: 10 INJECTION, EMULSION INTRAVENOUS at 12:57

## 2023-03-21 RX ADMIN — EPHEDRINE SULFATE 10 MG: 5 INJECTION INTRAVENOUS at 14:26

## 2023-03-21 RX ADMIN — ALBUMIN HUMAN 12.5 G: 0.05 INJECTION, SOLUTION INTRAVENOUS at 23:13

## 2023-03-21 RX ADMIN — GLYCOPYRROLATE 0.5 MG: 0.2 INJECTION, SOLUTION INTRAMUSCULAR; INTRAVENOUS at 13:57

## 2023-03-21 RX ADMIN — SODIUM CHLORIDE, POTASSIUM CHLORIDE, SODIUM LACTATE AND CALCIUM CHLORIDE: 600; 310; 30; 20 INJECTION, SOLUTION INTRAVENOUS at 12:15

## 2023-03-21 RX ADMIN — ROCURONIUM BROMIDE 50 MG: 10 INJECTION, SOLUTION INTRAVENOUS at 12:58

## 2023-03-21 SDOH — SOCIAL STABILITY: SOCIAL INSECURITY: RISK FACTORS: BMI> 30 (OBESITY)

## 2023-03-21 SDOH — SOCIAL STABILITY: SOCIAL INSECURITY: RISK FACTORS: AGE

## 2023-03-21 SDOH — ECONOMIC STABILITY: FOOD INSECURITY: HOW OFTEN IN THE PAST 12 MONTHS WERE YOU WORRIED OR STRESSED ABOUT HAVING ENOUGH MONEY TO BUY NUTRITIOUS MEALS?: NEVER

## 2023-03-21 SDOH — SOCIAL STABILITY: SOCIAL INSECURITY: RISK FACTORS: SLEEP APNEA

## 2023-03-21 SDOH — SOCIAL STABILITY: SOCIAL NETWORK
HOW OFTEN DO YOU SEE OR TALK TO PEOPLE THAT YOU CARE ABOUT AND FEEL CLOSE TO? (FOR EXAMPLE: TALKING TO FRIENDS ON THE PHONE, VISITING FRIENDS OR FAMILY, GOING TO CHURCH OR CLUB MEETINGS): 5 OR MORE TIMES A WEEK

## 2023-03-21 SDOH — SOCIAL STABILITY: SOCIAL INSECURITY: RISK FACTORS: VASCULAR DISEASE

## 2023-03-21 SDOH — SOCIAL STABILITY: SOCIAL INSECURITY: RISK FACTORS: HEART DISEASE

## 2023-03-21 ASSESSMENT — LIFESTYLE VARIABLES
HOW MANY STANDARD DRINKS CONTAINING ALCOHOL DO YOU HAVE ON A TYPICAL DAY: 0,1 OR 2
HOW OFTEN DO YOU HAVE A DRINK CONTAINING ALCOHOL: NEVER
HOW OFTEN DO YOU HAVE 6 OR MORE DRINKS ON ONE OCCASION: NEVER
AUDIT-C TOTAL SCORE: 0

## 2023-03-21 ASSESSMENT — PAIN SCALES - GENERAL
PAINLEVEL_OUTOF10: 5
PAINLEVEL_OUTOF10: 0
PAINLEVEL_OUTOF10: 3
PAINLEVEL_OUTOF10: 5

## 2023-03-21 ASSESSMENT — ACTIVITIES OF DAILY LIVING (ADL)
ADL_BEFORE_ADMISSION: INDEPENDENT
RECENT_DECLINE_ADL: NO
ADL_SHORT_OF_BREATH: NO
ADL_SCORE: 12

## 2023-03-21 ASSESSMENT — PATIENT HEALTH QUESTIONNAIRE - PHQ9
1. LITTLE INTEREST OR PLEASURE IN DOING THINGS: NOT AT ALL
IS PATIENT ABLE TO COMPLETE PHQ2 OR PHQ9: YES
CLINICAL INTERPRETATION OF PHQ2 SCORE: NO FURTHER SCREENING NEEDED
SUM OF ALL RESPONSES TO PHQ9 QUESTIONS 1 AND 2: 0
SUM OF ALL RESPONSES TO PHQ9 QUESTIONS 1 AND 2: 0
2. FEELING DOWN, DEPRESSED OR HOPELESS: NOT AT ALL

## 2023-03-22 VITALS
WEIGHT: 213.85 LBS | BODY MASS INDEX: 30.61 KG/M2 | HEART RATE: 49 BPM | TEMPERATURE: 97.7 F | DIASTOLIC BLOOD PRESSURE: 67 MMHG | RESPIRATION RATE: 13 BRPM | HEIGHT: 70 IN | SYSTOLIC BLOOD PRESSURE: 99 MMHG | OXYGEN SATURATION: 98 %

## 2023-03-22 LAB
ANION GAP SERPL CALC-SCNC: 10 MMOL/L (ref 7–19)
BASOPHILS # BLD: 0 K/MCL (ref 0–0.3)
BASOPHILS NFR BLD: 0 %
BUN SERPL-MCNC: 25 MG/DL (ref 6–20)
BUN/CREAT SERPL: 38 (ref 7–25)
CALCIUM SERPL-MCNC: 7.9 MG/DL (ref 8.4–10.2)
CHLORIDE SERPL-SCNC: 107 MMOL/L (ref 97–110)
CO2 SERPL-SCNC: 25 MMOL/L (ref 21–32)
CREAT SERPL-MCNC: 0.66 MG/DL (ref 0.67–1.17)
DEPRECATED RDW RBC: 53.6 FL (ref 39–50)
EOSINOPHIL # BLD: 0 K/MCL (ref 0–0.5)
EOSINOPHIL NFR BLD: 0 %
ERYTHROCYTE [DISTWIDTH] IN BLOOD: 14.9 % (ref 11–15)
FASTING DURATION TIME PATIENT: ABNORMAL H
GFR SERPLBLD BASED ON 1.73 SQ M-ARVRAT: >90 ML/MIN
GLUCOSE SERPL-MCNC: 122 MG/DL (ref 70–99)
HCT VFR BLD CALC: 37 % (ref 39–51)
HGB BLD-MCNC: 11.9 G/DL (ref 13–17)
IMM GRANULOCYTES # BLD AUTO: 0.1 K/MCL (ref 0–0.2)
IMM GRANULOCYTES # BLD: 1 %
LYMPHOCYTES # BLD: 1 K/MCL (ref 1–4)
LYMPHOCYTES NFR BLD: 9 %
MCH RBC QN AUTO: 31.1 PG (ref 26–34)
MCHC RBC AUTO-ENTMCNC: 32.2 G/DL (ref 32–36.5)
MCV RBC AUTO: 96.6 FL (ref 78–100)
MONOCYTES # BLD: 0.5 K/MCL (ref 0.3–0.9)
MONOCYTES NFR BLD: 5 %
NEUTROPHILS # BLD: 9.6 K/MCL (ref 1.8–7.7)
NEUTROPHILS NFR BLD: 85 %
NRBC BLD MANUAL-RTO: 0 /100 WBC
PLATELET # BLD AUTO: 169 K/MCL (ref 140–450)
POTASSIUM SERPL-SCNC: 4.3 MMOL/L (ref 3.4–5.1)
RBC # BLD: 3.83 MIL/MCL (ref 4.5–5.9)
SODIUM SERPL-SCNC: 138 MMOL/L (ref 135–145)
WBC # BLD: 11.1 K/MCL (ref 4.2–11)

## 2023-03-22 PROCEDURE — 80048 BASIC METABOLIC PNL TOTAL CA: CPT | Performed by: SURGERY

## 2023-03-22 PROCEDURE — 10004651 HB RX, NO CHARGE ITEM: Performed by: SURGERY

## 2023-03-22 PROCEDURE — 10002807 HB RX 258: Performed by: SURGERY

## 2023-03-22 PROCEDURE — 10002803 HB RX 637: Performed by: SURGERY

## 2023-03-22 PROCEDURE — 10002800 HB RX 250 W HCPCS: Performed by: SURGERY

## 2023-03-22 PROCEDURE — 10002800 HB RX 250 W HCPCS: Performed by: INTERNAL MEDICINE

## 2023-03-22 PROCEDURE — 10004651 HB RX, NO CHARGE ITEM: Performed by: INTERNAL MEDICINE

## 2023-03-22 PROCEDURE — 99239 HOSP IP/OBS DSCHRG MGMT >30: CPT | Performed by: INTERNAL MEDICINE

## 2023-03-22 PROCEDURE — 85025 COMPLETE CBC W/AUTO DIFF WBC: CPT | Performed by: SURGERY

## 2023-03-22 PROCEDURE — 99291 CRITICAL CARE FIRST HOUR: CPT

## 2023-03-22 PROCEDURE — 99233 SBSQ HOSP IP/OBS HIGH 50: CPT | Performed by: NURSE PRACTITIONER

## 2023-03-22 PROCEDURE — 10002803 HB RX 637: Performed by: INTERNAL MEDICINE

## 2023-03-22 RX ADMIN — ATROPINE SULFATE 0.5 MG: 0.1 INJECTION INTRAVENOUS at 02:51

## 2023-03-22 RX ADMIN — ASPIRIN 81 MG: 81 TABLET, COATED ORAL at 08:06

## 2023-03-22 RX ADMIN — ACETAMINOPHEN 650 MG: 325 TABLET ORAL at 09:55

## 2023-03-22 RX ADMIN — SODIUM CHLORIDE, PRESERVATIVE FREE 2 ML: 5 INJECTION INTRAVENOUS at 08:06

## 2023-03-22 RX ADMIN — HEPARIN SODIUM 5000 UNITS: 5000 INJECTION, SOLUTION INTRAVENOUS; SUBCUTANEOUS at 06:50

## 2023-03-22 RX ADMIN — ROSUVASTATIN CALCIUM 20 MG: 20 TABLET, FILM COATED ORAL at 08:06

## 2023-03-22 RX ADMIN — TRAMADOL HYDROCHLORIDE 50 MG: 50 TABLET, COATED ORAL at 05:06

## 2023-03-22 RX ADMIN — CLOPIDOGREL BISULFATE 75 MG: 75 TABLET, FILM COATED ORAL at 08:05

## 2023-03-22 RX ADMIN — SODIUM CHLORIDE, POTASSIUM CHLORIDE, SODIUM LACTATE AND CALCIUM CHLORIDE 500 ML: 600; 310; 30; 20 INJECTION, SOLUTION INTRAVENOUS at 02:17

## 2023-03-22 RX ADMIN — HYDRALAZINE HYDROCHLORIDE 5 MG: 20 INJECTION, SOLUTION INTRAMUSCULAR; INTRAVENOUS at 04:24

## 2023-03-22 ASSESSMENT — PAIN SCALES - GENERAL
PAINLEVEL_OUTOF10: 5
PAINLEVEL_OUTOF10: 2
PAINLEVEL_OUTOF10: 3
PAINLEVEL_OUTOF10: 4

## 2023-04-21 DIAGNOSIS — I65.29 OCCLUSION AND STENOSIS OF UNSPECIFIED CAROTID ARTERY: Primary | ICD-10-CM

## 2023-05-06 ENCOUNTER — HOSPITAL ENCOUNTER (OUTPATIENT)
Age: 63
Discharge: HOME | End: 2023-05-06
Payer: MEDICARE

## 2023-05-06 ENCOUNTER — APPOINTMENT (OUTPATIENT)
Dept: GENERAL RADIOLOGY | Age: 63
End: 2023-05-06
Attending: PHYSICIAN ASSISTANT
Payer: MEDICARE

## 2023-05-06 ENCOUNTER — HOSPITAL ENCOUNTER (EMERGENCY)
Facility: HOSPITAL | Age: 63
Discharge: HOME | End: 2023-05-06
Attending: EMERGENCY MEDICINE
Payer: MEDICARE

## 2023-05-06 ENCOUNTER — APPOINTMENT (OUTPATIENT)
Dept: GENERAL RADIOLOGY | Facility: HOSPITAL | Age: 63
End: 2023-05-06
Attending: EMERGENCY MEDICINE
Payer: MEDICARE

## 2023-05-06 VITALS
HEART RATE: 80 BPM | TEMPERATURE: 97 F | RESPIRATION RATE: 18 BRPM | SYSTOLIC BLOOD PRESSURE: 110 MMHG | DIASTOLIC BLOOD PRESSURE: 88 MMHG | OXYGEN SATURATION: 97 %

## 2023-05-06 VITALS
WEIGHT: 213 LBS | HEART RATE: 83 BPM | DIASTOLIC BLOOD PRESSURE: 63 MMHG | RESPIRATION RATE: 20 BRPM | SYSTOLIC BLOOD PRESSURE: 115 MMHG | TEMPERATURE: 98 F | OXYGEN SATURATION: 96 % | BODY MASS INDEX: 31 KG/M2

## 2023-05-06 DIAGNOSIS — B34.9 VIRAL SYNDROME: Primary | ICD-10-CM

## 2023-05-06 DIAGNOSIS — J01.90 ACUTE SINUSITIS, RECURRENCE NOT SPECIFIED, UNSPECIFIED LOCATION: Primary | ICD-10-CM

## 2023-05-06 LAB
ALBUMIN SERPL-MCNC: 3.7 G/DL (ref 3.4–5)
ALBUMIN/GLOB SERPL: 1.1 {RATIO} (ref 1–2)
ALP LIVER SERPL-CCNC: 81 U/L
ALT SERPL-CCNC: 34 U/L
ANION GAP SERPL CALC-SCNC: 8 MMOL/L (ref 0–18)
AST SERPL-CCNC: 30 U/L (ref 15–37)
BASOPHILS # BLD AUTO: 0.04 X10(3) UL (ref 0–0.2)
BASOPHILS NFR BLD AUTO: 0.5 %
BILIRUB SERPL-MCNC: 0.2 MG/DL (ref 0.1–2)
BILIRUB UR QL STRIP.AUTO: NEGATIVE
BUN BLD-MCNC: 29 MG/DL (ref 7–18)
CALCIUM BLD-MCNC: 9.3 MG/DL (ref 8.5–10.1)
CHLORIDE SERPL-SCNC: 104 MMOL/L (ref 98–112)
CLARITY UR REFRACT.AUTO: CLEAR
CO2 SERPL-SCNC: 26 MMOL/L (ref 21–32)
COLOR UR AUTO: YELLOW
CREAT BLD-MCNC: 0.98 MG/DL
EOSINOPHIL # BLD AUTO: 0.25 X10(3) UL (ref 0–0.7)
EOSINOPHIL NFR BLD AUTO: 3.3 %
ERYTHROCYTE [DISTWIDTH] IN BLOOD BY AUTOMATED COUNT: 13.5 %
GFR SERPLBLD BASED ON 1.73 SQ M-ARVRAT: 87 ML/MIN/1.73M2 (ref 60–?)
GLOBULIN PLAS-MCNC: 3.4 G/DL (ref 2.8–4.4)
GLUCOSE BLD-MCNC: 106 MG/DL (ref 70–99)
GLUCOSE UR STRIP.AUTO-MCNC: NEGATIVE MG/DL
HCT VFR BLD AUTO: 42.8 %
HGB BLD-MCNC: 14 G/DL
IMM GRANULOCYTES # BLD AUTO: 0.02 X10(3) UL (ref 0–1)
IMM GRANULOCYTES NFR BLD: 0.3 %
KETONES UR STRIP.AUTO-MCNC: NEGATIVE MG/DL
LEUKOCYTE ESTERASE UR QL STRIP.AUTO: NEGATIVE
LIPASE SERPL-CCNC: 26 U/L (ref 13–75)
LYMPHOCYTES # BLD AUTO: 2.49 X10(3) UL (ref 1–4)
LYMPHOCYTES NFR BLD AUTO: 33.2 %
MCH RBC QN AUTO: 30.8 PG (ref 26–34)
MCHC RBC AUTO-ENTMCNC: 32.7 G/DL (ref 31–37)
MCV RBC AUTO: 94.3 FL
MONOCYTES # BLD AUTO: 0.68 X10(3) UL (ref 0.1–1)
MONOCYTES NFR BLD AUTO: 9.1 %
NEUTROPHILS # BLD AUTO: 4.01 X10 (3) UL (ref 1.5–7.7)
NEUTROPHILS # BLD AUTO: 4.01 X10(3) UL (ref 1.5–7.7)
NEUTROPHILS NFR BLD AUTO: 53.6 %
NITRITE UR QL STRIP.AUTO: NEGATIVE
OSMOLALITY SERPL CALC.SUM OF ELEC: 292 MOSM/KG (ref 275–295)
PH UR STRIP.AUTO: 7 [PH] (ref 5–8)
PLATELET # BLD AUTO: 201 10(3)UL (ref 150–450)
POCT INFLUENZA A: NEGATIVE
POCT INFLUENZA B: NEGATIVE
POTASSIUM SERPL-SCNC: 4.1 MMOL/L (ref 3.5–5.1)
PROT SERPL-MCNC: 7.1 G/DL (ref 6.4–8.2)
PROT UR STRIP.AUTO-MCNC: NEGATIVE MG/DL
RBC # BLD AUTO: 4.54 X10(6)UL
RBC UR QL AUTO: NEGATIVE
SARS-COV-2 RNA RESP QL NAA+PROBE: NOT DETECTED
SODIUM SERPL-SCNC: 138 MMOL/L (ref 136–145)
SP GR UR STRIP.AUTO: 1.02 (ref 1–1.03)
TROPONIN I HIGH SENSITIVITY: 4 NG/L
TSI SER-ACNC: 1.3 MIU/ML (ref 0.36–3.74)
UROBILINOGEN UR STRIP.AUTO-MCNC: <2 MG/DL
WBC # BLD AUTO: 7.5 X10(3) UL (ref 4–11)

## 2023-05-06 PROCEDURE — 87502 INFLUENZA DNA AMP PROBE: CPT | Performed by: PHYSICIAN ASSISTANT

## 2023-05-06 PROCEDURE — 96361 HYDRATE IV INFUSION ADD-ON: CPT

## 2023-05-06 PROCEDURE — 99215 OFFICE O/P EST HI 40 MIN: CPT | Performed by: PHYSICIAN ASSISTANT

## 2023-05-06 PROCEDURE — 71045 X-RAY EXAM CHEST 1 VIEW: CPT | Performed by: EMERGENCY MEDICINE

## 2023-05-06 PROCEDURE — 83690 ASSAY OF LIPASE: CPT | Performed by: EMERGENCY MEDICINE

## 2023-05-06 PROCEDURE — U0002 COVID-19 LAB TEST NON-CDC: HCPCS | Performed by: PHYSICIAN ASSISTANT

## 2023-05-06 PROCEDURE — 81001 URINALYSIS AUTO W/SCOPE: CPT | Performed by: EMERGENCY MEDICINE

## 2023-05-06 PROCEDURE — 93010 ELECTROCARDIOGRAM REPORT: CPT

## 2023-05-06 PROCEDURE — 80053 COMPREHEN METABOLIC PANEL: CPT | Performed by: EMERGENCY MEDICINE

## 2023-05-06 PROCEDURE — 93000 ELECTROCARDIOGRAM COMPLETE: CPT | Performed by: PHYSICIAN ASSISTANT

## 2023-05-06 PROCEDURE — 99284 EMERGENCY DEPT VISIT MOD MDM: CPT

## 2023-05-06 PROCEDURE — 84484 ASSAY OF TROPONIN QUANT: CPT | Performed by: EMERGENCY MEDICINE

## 2023-05-06 PROCEDURE — 84443 ASSAY THYROID STIM HORMONE: CPT | Performed by: EMERGENCY MEDICINE

## 2023-05-06 PROCEDURE — 99285 EMERGENCY DEPT VISIT HI MDM: CPT

## 2023-05-06 PROCEDURE — 96360 HYDRATION IV INFUSION INIT: CPT

## 2023-05-06 PROCEDURE — 93005 ELECTROCARDIOGRAM TRACING: CPT

## 2023-05-06 PROCEDURE — 85025 COMPLETE CBC W/AUTO DIFF WBC: CPT | Performed by: EMERGENCY MEDICINE

## 2023-05-06 RX ORDER — AMOXICILLIN AND CLAVULANATE POTASSIUM 875; 125 MG/1; MG/1
1 TABLET, FILM COATED ORAL 2 TIMES DAILY
Qty: 20 TABLET | Refills: 0 | Status: SHIPPED | OUTPATIENT
Start: 2023-05-06 | End: 2023-05-16

## 2023-05-06 NOTE — ED INITIAL ASSESSMENT (HPI)
C/o URI. Was in [place]. Has a sore throat, productive cough, n/v/d. Seen at  told \"Lungs clear\" Also having abdominal pain    - covid.

## 2023-05-06 NOTE — ED INITIAL ASSESSMENT (HPI)
Pt has had a cough , fatigue and loss of voice for the past 5-6 days with a runny nose, and some diarrhea.   Did have dark colored sputum, no fever

## 2023-05-07 LAB
ATRIAL RATE: 78 BPM
ATRIAL RATE: 78 BPM
P AXIS: 56 DEGREES
P AXIS: 59 DEGREES
P-R INTERVAL: 142 MS
P-R INTERVAL: 152 MS
Q-T INTERVAL: 388 MS
Q-T INTERVAL: 394 MS
QRS DURATION: 94 MS
QRS DURATION: 98 MS
QTC CALCULATION (BEZET): 442 MS
QTC CALCULATION (BEZET): 449 MS
R AXIS: 35 DEGREES
R AXIS: 45 DEGREES
T AXIS: 37 DEGREES
T AXIS: 52 DEGREES
VENTRICULAR RATE: 78 BPM
VENTRICULAR RATE: 78 BPM

## 2023-06-11 NOTE — PLAN OF CARE
History  Chief Complaint   Patient presents with   • Weakness - Generalized     Seen in Ed yesterday and signed out AMA     This is a 72-year-old male who presents with generalized weakness and confusion seen here yesterday signed out [address] was found to have hypothyroidism secondary to medication noncompliance CAT scan of the head was unremarkable he is oriented to person and place but not time without any focal neurologic deficits  History provided by:  Patient and relative  Medical Problem  Location:  Generalized  Quality:  Confusion and weakness  Severity:  Unable to specify  Onset quality:  Gradual  Timing:  Constant  Progression:  Waxing and waning  Chronicity:  New  Context:  Generalized weakness and confusion  Worsened by:  Medication noncompliance      Prior to Admission Medications   Prescriptions Last Dose Informant Patient Reported? Taking? Cyanocobalamin 1000 MCG SUBL   Yes No   Sig: every 24 hours   Levothyroxine Sodium 112 MCG CAPS   Yes No   Sig: Take 1 capsule by mouth every 24 hours   ferrous sulfate 325 (65 Fe) mg tablet   Yes No   Sig: Every 12 hours   fluticasone (FLONASE) 50 mcg/act nasal spray   No No   Si spray into each nostril daily   latanoprost (XALATAN) 0 005 % ophthalmic solution   Yes No   Sig: place 1 drop into both eyes at bedtime   levothyroxine 112 mcg tablet   Yes No   Sig: Take 112 mcg by mouth every morning Take on an empty stomach   losartan (COZAAR) 50 mg tablet   Yes No   Sig: Take 1 tablet by mouth every 24 hours   lovastatin (MEVACOR) 40 MG tablet   Yes No   Sig: Take 1 tablet by mouth every 24 hours      Facility-Administered Medications: None       Past Medical History:   Diagnosis Date   • Disease of thyroid gland    • Hyperlipidemia    • Hypertension        Past Surgical History:   Procedure Laterality Date   • HERNIA REPAIR     • REPLACEMENT TOTAL KNEE BILATERAL         History reviewed  No pertinent family history    I have reviewed and agree Pt A/Ox4, on RA, NSR per tele  C/o Rt chest tube site pain, norco given with relief  Pt up with 1 assist, tolerating well  Rt chest tube in place, to water seal at 0721  CT chest completed  PLAN: repeat CXR at 1200  Will cont to monitor     DISCHARGE PLANN with the history as documented  E-Cigarette/Vaping   • E-Cigarette Use Never User      E-Cigarette/Vaping Substances     Social History     Tobacco Use   • Smoking status: Former     Types: Cigarettes     Quit date: 10/23/2009     Years since quittin 6   • Smokeless tobacco: Never   Vaping Use   • Vaping Use: Never used   Substance Use Topics   • Alcohol use: Yes     Comment: socially   • Drug use: Never       Review of Systems   Neurological: Positive for weakness  All other systems reviewed and are negative  Physical Exam  Physical Exam  Vitals and nursing note reviewed  Constitutional:       General: He is not in acute distress  Appearance: He is not ill-appearing, toxic-appearing or diaphoretic  HENT:      Head: Normocephalic and atraumatic  Right Ear: Tympanic membrane, ear canal and external ear normal       Left Ear: Tympanic membrane, ear canal and external ear normal       Nose: Nose normal       Mouth/Throat:      Mouth: Mucous membranes are dry  Eyes:      General: No scleral icterus  Right eye: No discharge  Left eye: No discharge  Extraocular Movements: Extraocular movements intact  Pupils: Pupils are equal, round, and reactive to light  Cardiovascular:      Rate and Rhythm: Normal rate and regular rhythm  Pulses: Normal pulses  Heart sounds: No murmur heard  No friction rub  No gallop  Pulmonary:      Effort: Pulmonary effort is normal  No respiratory distress  Breath sounds: No stridor  No wheezing, rhonchi or rales  Abdominal:      General: There is no distension  Palpations: Abdomen is soft  Tenderness: There is no abdominal tenderness  There is no guarding or rebound  Musculoskeletal:         General: No swelling, tenderness, deformity or signs of injury  Normal range of motion  Cervical back: Normal range of motion and neck supple  No rigidity or tenderness  Right lower leg: No edema        Left lower leg: No edema  Skin:     General: Skin is warm and dry  Coloration: Skin is not jaundiced  Findings: No bruising, erythema or rash  Neurological:      General: No focal deficit present  Mental Status: He is alert  He is disoriented  Cranial Nerves: No cranial nerve deficit  Sensory: No sensory deficit  Motor: Weakness present        Coordination: Coordination normal    Psychiatric:         Mood and Affect: Mood normal          Vital Signs  ED Triage Vitals [06/11/23 1045]   Temperature Pulse Respirations Blood Pressure SpO2   98 9 °F (37 2 °C) 69 20 126/89 98 %      Temp Source Heart Rate Source Patient Position - Orthostatic VS BP Location FiO2 (%)   Oral Monitor Sitting Left arm --      Pain Score       No Pain           Vitals:    06/11/23 1045   BP: 126/89   Pulse: 69   Patient Position - Orthostatic VS: Sitting         Visual Acuity      ED Medications  Medications - No data to display    Diagnostic Studies  Results Reviewed     Procedure Component Value Units Date/Time    Comprehensive metabolic panel [[id number]]  (Abnormal) Collected: 06/11/23 1054    Lab Status: Final result Specimen: Blood from Arm, Right Updated: 06/11/23 1124     Sodium 138 mmol/L      Potassium 3 6 mmol/L      Chloride 101 mmol/L      CO2 29 mmol/L      ANION GAP 8 mmol/L      BUN 18 mg/dL      Creatinine 1 70 mg/dL      Glucose 135 mg/dL      Calcium 9 4 mg/dL      AST 52 U/L      ALT 31 U/L      Alkaline Phosphatase 48 U/L      Total Protein 7 2 g/dL      Albumin 4 6 g/dL      Total Bilirubin 2 66 mg/dL      eGFR 38 ml/min/1 73sq m     Narrative:      [place] guidelines for Chronic Kidney Disease (CKD):   •  Stage 1 with normal or high GFR (GFR > 90 mL/min/1 73 square meters)  •  Stage 2 Mild CKD (GFR = 60-89 mL/min/1 73 square meters)  •  Stage 3A Moderate CKD (GFR = 45-59 mL/min/1 73 square meters)  •  Stage 3B Moderate CKD (GFR = 30-44 mL/min/1 73 square meters)  • Stage 4 Severe CKD (GFR = 15-29 mL/min/1 73 square meters)  •  Stage 5 End Stage CKD (GFR <15 mL/min/1 73 square meters)  Note: GFR calculation is accurate only with a steady state creatinine    CBC and differential [[id number]] Collected: 06/11/23 1054    Lab Status: Final result Specimen: Blood from Arm, Right Updated: 06/11/23 1059     WBC 4 59 Thousand/uL      RBC 4 62 Million/uL      Hemoglobin 15 0 g/dL      Hematocrit 45 1 %      MCV 98 fL      MCH 32 5 pg      MCHC 33 3 g/dL      RDW 14 5 %      MPV 10 8 fL      Platelets 207 Thousands/uL      nRBC 0 /100 WBCs      Neutrophils Relative 61 %      Immat GRANS % 0 %      Lymphocytes Relative 29 %      Monocytes Relative 8 %      Eosinophils Relative 1 %      Basophils Relative 1 %      Neutrophils Absolute 2 80 Thousands/µL      Immature Grans Absolute 0 01 Thousand/uL      Lymphocytes Absolute 1 33 Thousands/µL      Monocytes Absolute 0 37 Thousand/µL      Eosinophils Absolute 0 05 Thousand/µL      Basophils Absolute 0 03 Thousands/µL     TSH [[id number]] Collected: 06/11/23 1054    Lab Status:  In process Specimen: Blood from Arm, Right Updated: 06/11/23 1057                 No orders to display              Procedures  ECG 12 Lead Documentation Only    Date/Time: 6/11/2023 12:37 PM    Performed by: Josep Heath DO  Authorized by: Josep Heath DO    ECG reviewed by me, the ED Provider: yes    Patient location:  ED  Rate:     ECG rate:  71  Rhythm:     Rhythm: sinus rhythm    Conduction:     Conduction: normal    T waves:     T waves: normal               ED Course                                             Medical Decision Making  Generalized weakness differential includes dehydration electrolyte abnormality hypothyroidism and exacerbation of possible underlying dementia work-up in progress we will recheck labs and admit for observation medical evaluation and PT OT and case management consultation    Amount and/or Complexity of Data Reviewed  Labs: ordered  Disposition  Final diagnoses:   Confusion   Weakness   Hypothyroidism     Time reflects when diagnosis was documented in both MDM as applicable and the Disposition within this note     Time User Action Codes Description Comment    6/11/2023 12:38 PM Anthonette Bucy Add [R41 0] Confusion     6/11/2023 12:38 PM Anthonette Bucy Add [R53 1] Weakness     6/11/2023 12:38 PM Anthonette Bucy Add [E03 9] Hypothyroidism       ED Disposition     ED Disposition   Admit    Condition   Stable    Date/Time   Sun Jun 11, 2023 12:38 PM    Comment   Case was discussed with *Dr Kary Perez** and the patient's admission status was agreed to be Admission Status: observation status to the service of Dr Kary Perez   Follow-up Information    None         Patient's Medications   Discharge Prescriptions    No medications on file       No discharge procedures on file      PDMP Review     None          ED Provider  Electronically Signed by           Ruben Torrez DO  06/11/23 0056

## 2023-07-06 ENCOUNTER — HOSPITAL ENCOUNTER (EMERGENCY)
Facility: HOSPITAL | Age: 63
Discharge: HOME | End: 2023-07-06
Attending: EMERGENCY MEDICINE
Payer: MEDICARE

## 2023-07-06 ENCOUNTER — APPOINTMENT (OUTPATIENT)
Dept: CT IMAGING | Facility: HOSPITAL | Age: 63
End: 2023-07-06
Attending: EMERGENCY MEDICINE
Payer: MEDICARE

## 2023-07-06 VITALS
RESPIRATION RATE: 18 BRPM | WEIGHT: 216 LBS | HEIGHT: 70 IN | HEART RATE: 67 BPM | OXYGEN SATURATION: 94 % | TEMPERATURE: 98 F | BODY MASS INDEX: 30.92 KG/M2 | SYSTOLIC BLOOD PRESSURE: 118 MMHG | DIASTOLIC BLOOD PRESSURE: 79 MMHG

## 2023-07-06 DIAGNOSIS — K52.9 COLITIS: Primary | ICD-10-CM

## 2023-07-06 LAB
ADENOVIRUS F 40/41 PCR: NEGATIVE
ALBUMIN SERPL-MCNC: 3.8 G/DL (ref 3.4–5)
ALBUMIN/GLOB SERPL: 1.1 {RATIO} (ref 1–2)
ALP LIVER SERPL-CCNC: 64 U/L
ALT SERPL-CCNC: 38 U/L
ANION GAP SERPL CALC-SCNC: 4 MMOL/L (ref 0–18)
AST SERPL-CCNC: 24 U/L (ref 15–37)
ASTROVIRUS PCR: NEGATIVE
BASOPHILS # BLD AUTO: 0.02 X10(3) UL (ref 0–0.2)
BASOPHILS NFR BLD AUTO: 0.4 %
BILIRUB SERPL-MCNC: 0.4 MG/DL (ref 0.1–2)
BILIRUB UR QL STRIP.AUTO: NEGATIVE
BUN BLD-MCNC: 21 MG/DL (ref 7–18)
C CAYETANENSIS DNA SPEC QL NAA+PROBE: NEGATIVE
C DIFF TOX B STL QL: NEGATIVE
CALCIUM BLD-MCNC: 8.6 MG/DL (ref 8.5–10.1)
CAMPY SP DNA.DIARRHEA STL QL NAA+PROBE: NEGATIVE
CHLORIDE SERPL-SCNC: 108 MMOL/L (ref 98–112)
CLARITY UR REFRACT.AUTO: CLEAR
CO2 SERPL-SCNC: 28 MMOL/L (ref 21–32)
COLOR UR AUTO: YELLOW
CREAT BLD-MCNC: 0.7 MG/DL
CRYPTOSP DNA SPEC QL NAA+PROBE: NEGATIVE
EAEC PAA PLAS AGGR+AATA ST NAA+NON-PRB: NEGATIVE
EC STX1+STX2 + H7 FLIC SPEC NAA+PROBE: NEGATIVE
ENTAMOEBA HISTOLYTICA PCR: NEGATIVE
EOSINOPHIL # BLD AUTO: 0.15 X10(3) UL (ref 0–0.7)
EOSINOPHIL NFR BLD AUTO: 3.1 %
EPEC EAE GENE STL QL NAA+NON-PROBE: NEGATIVE
ERYTHROCYTE [DISTWIDTH] IN BLOOD BY AUTOMATED COUNT: 13.3 %
ETEC LTA+ST1A+ST1B TOX ST NAA+NON-PROBE: NEGATIVE
GFR SERPLBLD BASED ON 1.73 SQ M-ARVRAT: 104 ML/MIN/1.73M2 (ref 60–?)
GIARDIA LAMBLIA PCR: NEGATIVE
GLOBULIN PLAS-MCNC: 3.5 G/DL (ref 2.8–4.4)
GLUCOSE BLD-MCNC: 89 MG/DL (ref 70–99)
GLUCOSE UR STRIP.AUTO-MCNC: NEGATIVE MG/DL
HCT VFR BLD AUTO: 47.3 %
HGB BLD-MCNC: 15.8 G/DL
IMM GRANULOCYTES # BLD AUTO: 0.01 X10(3) UL (ref 0–1)
IMM GRANULOCYTES NFR BLD: 0.2 %
KETONES UR STRIP.AUTO-MCNC: NEGATIVE MG/DL
LEUKOCYTE ESTERASE UR QL STRIP.AUTO: NEGATIVE
LIPASE SERPL-CCNC: 20 U/L (ref 13–75)
LYMPHOCYTES # BLD AUTO: 1.94 X10(3) UL (ref 1–4)
LYMPHOCYTES NFR BLD AUTO: 40.2 %
MCH RBC QN AUTO: 30.9 PG (ref 26–34)
MCHC RBC AUTO-ENTMCNC: 33.4 G/DL (ref 31–37)
MCV RBC AUTO: 92.4 FL
MONOCYTES # BLD AUTO: 0.65 X10(3) UL (ref 0.1–1)
MONOCYTES NFR BLD AUTO: 13.5 %
NEUTROPHILS # BLD AUTO: 2.05 X10 (3) UL (ref 1.5–7.7)
NEUTROPHILS # BLD AUTO: 2.05 X10(3) UL (ref 1.5–7.7)
NEUTROPHILS NFR BLD AUTO: 42.6 %
NITRITE UR QL STRIP.AUTO: NEGATIVE
NOROVIRUS GI/GII PCR: POSITIVE
OSMOLALITY SERPL CALC.SUM OF ELEC: 292 MOSM/KG (ref 275–295)
P SHIGELLOIDES DNA STL QL NAA+PROBE: NEGATIVE
PH UR STRIP.AUTO: 5 [PH] (ref 5–8)
PLATELET # BLD AUTO: 181 10(3)UL (ref 150–450)
POTASSIUM SERPL-SCNC: 3.3 MMOL/L (ref 3.5–5.1)
PROT SERPL-MCNC: 7.3 G/DL (ref 6.4–8.2)
PROT UR STRIP.AUTO-MCNC: 30 MG/DL
RBC # BLD AUTO: 5.12 X10(6)UL
RBC UR QL AUTO: NEGATIVE
ROTAVIRUS A PCR: NEGATIVE
SALMONELLA DNA SPEC QL NAA+PROBE: NEGATIVE
SAPOVIRUS PCR: NEGATIVE
SHIGELLA SP+EIEC IPAH ST NAA+NON-PROBE: NEGATIVE
SODIUM SERPL-SCNC: 140 MMOL/L (ref 136–145)
SP GR UR STRIP.AUTO: 1.03 (ref 1–1.03)
UROBILINOGEN UR STRIP.AUTO-MCNC: <2 MG/DL
V CHOLERAE DNA SPEC QL NAA+PROBE: NEGATIVE
VIBRIO DNA SPEC NAA+PROBE: NEGATIVE
WBC # BLD AUTO: 4.8 X10(3) UL (ref 4–11)
YERSINIA DNA SPEC NAA+PROBE: NEGATIVE

## 2023-07-06 PROCEDURE — 87507 IADNA-DNA/RNA PROBE TQ 12-25: CPT | Performed by: EMERGENCY MEDICINE

## 2023-07-06 PROCEDURE — 99284 EMERGENCY DEPT VISIT MOD MDM: CPT

## 2023-07-06 PROCEDURE — 96374 THER/PROPH/DIAG INJ IV PUSH: CPT

## 2023-07-06 PROCEDURE — 96372 THER/PROPH/DIAG INJ SC/IM: CPT

## 2023-07-06 PROCEDURE — 87493 C DIFF AMPLIFIED PROBE: CPT | Performed by: EMERGENCY MEDICINE

## 2023-07-06 PROCEDURE — 83690 ASSAY OF LIPASE: CPT | Performed by: EMERGENCY MEDICINE

## 2023-07-06 PROCEDURE — 74177 CT ABD & PELVIS W/CONTRAST: CPT | Performed by: EMERGENCY MEDICINE

## 2023-07-06 PROCEDURE — 96361 HYDRATE IV INFUSION ADD-ON: CPT

## 2023-07-06 PROCEDURE — 85025 COMPLETE CBC W/AUTO DIFF WBC: CPT | Performed by: EMERGENCY MEDICINE

## 2023-07-06 PROCEDURE — 99285 EMERGENCY DEPT VISIT HI MDM: CPT

## 2023-07-06 PROCEDURE — 80053 COMPREHEN METABOLIC PANEL: CPT | Performed by: EMERGENCY MEDICINE

## 2023-07-06 PROCEDURE — 81001 URINALYSIS AUTO W/SCOPE: CPT | Performed by: EMERGENCY MEDICINE

## 2023-07-06 RX ORDER — ONDANSETRON 2 MG/ML
4 INJECTION INTRAMUSCULAR; INTRAVENOUS ONCE
Status: COMPLETED | OUTPATIENT
Start: 2023-07-06 | End: 2023-07-06

## 2023-07-06 RX ORDER — DICYCLOMINE HCL 20 MG
20 TABLET ORAL 4 TIMES DAILY PRN
Qty: 30 TABLET | Refills: 0 | Status: SHIPPED | OUTPATIENT
Start: 2023-07-06 | End: 2023-08-05

## 2023-07-06 RX ORDER — DICYCLOMINE HYDROCHLORIDE 10 MG/ML
20 INJECTION INTRAMUSCULAR ONCE
Status: COMPLETED | OUTPATIENT
Start: 2023-07-06 | End: 2023-07-06

## 2023-07-06 RX ORDER — POTASSIUM CHLORIDE 20 MEQ/1
40 TABLET, EXTENDED RELEASE ORAL ONCE
Status: COMPLETED | OUTPATIENT
Start: 2023-07-06 | End: 2023-07-06

## 2023-07-06 RX ORDER — ONDANSETRON 4 MG/1
4 TABLET, ORALLY DISINTEGRATING ORAL EVERY 4 HOURS PRN
Qty: 10 TABLET | Refills: 0 | Status: SHIPPED | OUTPATIENT
Start: 2023-07-06 | End: 2023-07-13

## 2023-07-31 ENCOUNTER — HOSPITAL ENCOUNTER (OUTPATIENT)
Dept: GENERAL RADIOLOGY | Facility: HOSPITAL | Age: 63
Discharge: HOME | End: 2023-07-31
Attending: SPECIALIST
Payer: MEDICARE

## 2023-07-31 DIAGNOSIS — M79.671 RIGHT FOOT PAIN: ICD-10-CM

## 2023-07-31 PROCEDURE — 73630 X-RAY EXAM OF FOOT: CPT | Performed by: SPECIALIST

## 2024-01-26 NOTE — ED INITIAL ASSESSMENT (HPI)
Patient with c/o left sided rib pain after falling yesterday. Patient states he was walking and tripped in a hole, fell forward and landed primarily on his chest while trying to brace with his right hand.  Bruising noted to right hand, abrasion to left side 22.8

## 2024-04-17 DIAGNOSIS — N13.8 PROSTATE NODULE WITH URINARY OBSTRUCTION: Primary | ICD-10-CM

## 2024-04-17 DIAGNOSIS — N40.3 PROSTATE NODULE WITH URINARY OBSTRUCTION: Primary | ICD-10-CM

## 2024-04-21 ENCOUNTER — APPOINTMENT (OUTPATIENT)
Dept: GENERAL RADIOLOGY | Facility: HOSPITAL | Age: 64
End: 2024-04-21
Attending: STUDENT IN AN ORGANIZED HEALTH CARE EDUCATION/TRAINING PROGRAM
Payer: MEDICARE

## 2024-04-21 ENCOUNTER — HOSPITAL ENCOUNTER (EMERGENCY)
Facility: HOSPITAL | Age: 64
Discharge: HOME | End: 2024-04-21
Attending: STUDENT IN AN ORGANIZED HEALTH CARE EDUCATION/TRAINING PROGRAM
Payer: MEDICARE

## 2024-04-21 VITALS
HEART RATE: 81 BPM | TEMPERATURE: 98 F | DIASTOLIC BLOOD PRESSURE: 91 MMHG | RESPIRATION RATE: 18 BRPM | SYSTOLIC BLOOD PRESSURE: 159 MMHG | OXYGEN SATURATION: 98 %

## 2024-04-21 DIAGNOSIS — M54.31 SCIATICA OF RIGHT SIDE: Primary | ICD-10-CM

## 2024-04-21 LAB
ALBUMIN SERPL-MCNC: 3.9 G/DL (ref 3.4–5)
ALBUMIN/GLOB SERPL: 1.3 {RATIO} (ref 1–2)
ALP LIVER SERPL-CCNC: 60 U/L
ALT SERPL-CCNC: 20 U/L
ANION GAP SERPL CALC-SCNC: 7 MMOL/L (ref 0–18)
AST SERPL-CCNC: 18 U/L (ref 15–37)
BASOPHILS # BLD AUTO: 0.02 X10(3) UL (ref 0–0.2)
BASOPHILS NFR BLD AUTO: 0.3 %
BILIRUB SERPL-MCNC: 0.5 MG/DL (ref 0.1–2)
BILIRUB UR QL STRIP.AUTO: NEGATIVE
BUN BLD-MCNC: 18 MG/DL (ref 9–23)
CALCIUM BLD-MCNC: 8.5 MG/DL (ref 8.5–10.1)
CHLORIDE SERPL-SCNC: 108 MMOL/L (ref 98–112)
CLARITY UR REFRACT.AUTO: CLEAR
CO2 SERPL-SCNC: 25 MMOL/L (ref 21–32)
COLOR UR AUTO: YELLOW
CREAT BLD-MCNC: 1.13 MG/DL
EGFRCR SERPLBLD CKD-EPI 2021: 73 ML/MIN/1.73M2 (ref 60–?)
EOSINOPHIL # BLD AUTO: 0.15 X10(3) UL (ref 0–0.7)
EOSINOPHIL NFR BLD AUTO: 2.4 %
ERYTHROCYTE [DISTWIDTH] IN BLOOD BY AUTOMATED COUNT: 14.4 %
GLOBULIN PLAS-MCNC: 2.9 G/DL (ref 2.8–4.4)
GLUCOSE BLD-MCNC: 100 MG/DL (ref 70–99)
GLUCOSE UR STRIP.AUTO-MCNC: NORMAL MG/DL
HCT VFR BLD AUTO: 47.8 %
HGB BLD-MCNC: 15.5 G/DL
IMM GRANULOCYTES # BLD AUTO: 0.01 X10(3) UL (ref 0–1)
IMM GRANULOCYTES NFR BLD: 0.2 %
KETONES UR STRIP.AUTO-MCNC: NEGATIVE MG/DL
LEUKOCYTE ESTERASE UR QL STRIP.AUTO: NEGATIVE
LYMPHOCYTES # BLD AUTO: 1.57 X10(3) UL (ref 1–4)
LYMPHOCYTES NFR BLD AUTO: 25.3 %
MCH RBC QN AUTO: 30.5 PG (ref 26–34)
MCHC RBC AUTO-ENTMCNC: 32.4 G/DL (ref 31–37)
MCV RBC AUTO: 93.9 FL
MONOCYTES # BLD AUTO: 0.56 X10(3) UL (ref 0.1–1)
MONOCYTES NFR BLD AUTO: 9 %
NEUTROPHILS # BLD AUTO: 3.9 X10 (3) UL (ref 1.5–7.7)
NEUTROPHILS # BLD AUTO: 3.9 X10(3) UL (ref 1.5–7.7)
NEUTROPHILS NFR BLD AUTO: 62.8 %
NITRITE UR QL STRIP.AUTO: NEGATIVE
OSMOLALITY SERPL CALC.SUM OF ELEC: 292 MOSM/KG (ref 275–295)
PH UR STRIP.AUTO: 6.5 [PH] (ref 5–8)
PLATELET # BLD AUTO: 208 10(3)UL (ref 150–450)
POTASSIUM SERPL-SCNC: 4.4 MMOL/L (ref 3.5–5.1)
PROT SERPL-MCNC: 6.8 G/DL (ref 6.4–8.2)
PROT UR STRIP.AUTO-MCNC: 20 MG/DL
RBC # BLD AUTO: 5.09 X10(6)UL
RBC UR QL AUTO: NEGATIVE
SODIUM SERPL-SCNC: 140 MMOL/L (ref 136–145)
SP GR UR STRIP.AUTO: >1.03 (ref 1–1.03)
UROBILINOGEN UR STRIP.AUTO-MCNC: 2 MG/DL
WBC # BLD AUTO: 6.2 X10(3) UL (ref 4–11)

## 2024-04-21 PROCEDURE — 96375 TX/PRO/DX INJ NEW DRUG ADDON: CPT

## 2024-04-21 PROCEDURE — 81001 URINALYSIS AUTO W/SCOPE: CPT

## 2024-04-21 PROCEDURE — 96374 THER/PROPH/DIAG INJ IV PUSH: CPT

## 2024-04-21 PROCEDURE — 81001 URINALYSIS AUTO W/SCOPE: CPT | Performed by: STUDENT IN AN ORGANIZED HEALTH CARE EDUCATION/TRAINING PROGRAM

## 2024-04-21 PROCEDURE — 99284 EMERGENCY DEPT VISIT MOD MDM: CPT

## 2024-04-21 PROCEDURE — 99285 EMERGENCY DEPT VISIT HI MDM: CPT

## 2024-04-21 PROCEDURE — 72100 X-RAY EXAM L-S SPINE 2/3 VWS: CPT | Performed by: STUDENT IN AN ORGANIZED HEALTH CARE EDUCATION/TRAINING PROGRAM

## 2024-04-21 PROCEDURE — 71045 X-RAY EXAM CHEST 1 VIEW: CPT | Performed by: STUDENT IN AN ORGANIZED HEALTH CARE EDUCATION/TRAINING PROGRAM

## 2024-04-21 PROCEDURE — 80053 COMPREHEN METABOLIC PANEL: CPT | Performed by: EMERGENCY MEDICINE

## 2024-04-21 PROCEDURE — 73560 X-RAY EXAM OF KNEE 1 OR 2: CPT | Performed by: STUDENT IN AN ORGANIZED HEALTH CARE EDUCATION/TRAINING PROGRAM

## 2024-04-21 PROCEDURE — 85025 COMPLETE CBC W/AUTO DIFF WBC: CPT | Performed by: EMERGENCY MEDICINE

## 2024-04-21 RX ORDER — METHYLPREDNISOLONE 4 MG/1
TABLET ORAL
Qty: 21 TABLET | Refills: 0 | Status: SHIPPED | OUTPATIENT
Start: 2024-04-21 | End: 2024-04-24

## 2024-04-21 RX ORDER — MORPHINE SULFATE 4 MG/ML
4 INJECTION, SOLUTION INTRAMUSCULAR; INTRAVENOUS ONCE
Status: COMPLETED | OUTPATIENT
Start: 2024-04-21 | End: 2024-04-21

## 2024-04-21 RX ORDER — HYDROCODONE BITARTRATE AND ACETAMINOPHEN 5; 325 MG/1; MG/1
1-2 TABLET ORAL EVERY 6 HOURS PRN
Qty: 10 TABLET | Refills: 0 | Status: SHIPPED | OUTPATIENT
Start: 2024-04-21 | End: 2024-04-24

## 2024-04-21 RX ORDER — KETOROLAC TROMETHAMINE 15 MG/ML
15 INJECTION, SOLUTION INTRAMUSCULAR; INTRAVENOUS ONCE
Status: COMPLETED | OUTPATIENT
Start: 2024-04-21 | End: 2024-04-21

## 2024-04-21 NOTE — ED PROVIDER NOTES
Patient Seen in: [facility] Emergency Department      History     Chief Complaint   Patient presents with    Back Pain     Stated Complaint: back pain after lifting    Subjective:   HPI    Is a 64-year-old male presenting to the emergency room with back pain for a little over a week.  He states symptoms started after he lifted 20 pound object.  He notes he has a history of DJD between L4 and L5 and also sciatica.  Is worse with movement.  He denies any bladder incontinence or bowel incontinence or saddle anesthesia.  He does have a long history of peripheral neuropathy but states he has no new tingling in either extremity.  He has been taking naproxen which has not provided him with any relief.  Of note patient also tells me about 2 to 3 weeks ago he fell and landed on his right knee and that is been also bothering him    Objective:   Past Medical History:    Anxiety    Depression    Enlarged prostate    NIDIA (obstructive sleep apnea)    Other and unspecified hyperlipidemia    Unspecified essential hypertension              Past Surgical History:   Procedure Laterality Date    Stent placemt retro carotid Right                 Social History     Socioeconomic History    Marital status:    Tobacco Use    Smoking status: Former    Smokeless tobacco: Never   Vaping Use    Vaping status: Never Used   Substance and Sexual Activity    Alcohol use: Not Currently    Drug use: Never   Other Topics Concern    Caffeine Concern Yes     Comment: iced tea    Exercise Yes     Comment: walking     Social Determinants of Health      Received from [facility]    Food Insecurity   Physical Activity: Low Risk  (3/6/2023)    Received from [facility]    Exercise Vital Sign     On average, how many days per week do you engage in moderate to strenuous exercise (like a brisk walk)?: 5 days     On average, how many minutes do you engage in exercise at this level?: 120 min    Received from [facility]    Social Connections              Review of Systems    Positive for stated complaint: back pain after lifting  Other systems are as noted in HPI.  Constitutional and vital signs reviewed.      All other systems reviewed and negative except as noted above.    Physical Exam     ED Triage Vitals [04/21/24 1503]   /86   Pulse 82   Resp 18   Temp 98.2 °F (36.8 °C)   Temp src Oral   SpO2 96 %   O2 Device None (Room air)       Current:BP (!) 159/91   Pulse 81   Temp 98.2 °F (36.8 °C) (Oral)   Resp 18   SpO2 98%         Physical Exam  Vitals and nursing note reviewed.   Constitutional:       General: He is not in acute distress.     Appearance: Normal appearance.      Comments: Lying in the bed and appears uncomfortable   HENT:      Head: Normocephalic.      Nose: Nose normal.      Mouth/Throat:      Mouth: Mucous membranes are moist.   Eyes:      Extraocular Movements: Extraocular movements intact.      Pupils: Pupils are equal, round, and reactive to light.   Cardiovascular:      Rate and Rhythm: Normal rate and regular rhythm.      Pulses: Normal pulses.   Pulmonary:      Effort: Pulmonary effort is normal.   Abdominal:      General: Abdomen is flat. Bowel sounds are normal. There is no distension.      Palpations: Abdomen is soft.      Tenderness: There is no abdominal tenderness. There is no right CVA tenderness, left CVA tenderness, guarding or rebound.      Hernia: No hernia is present.   Musculoskeletal:         General: Normal range of motion.      Cervical back: Normal range of motion.      Comments: Pt with positive + straight leg test   Skin:     General: Skin is warm and dry.   Neurological:      Mental Status: He is alert and oriented to person, place, and time. Mental status is at baseline.   Psychiatric:         Mood and Affect: Mood normal.               ED Course     Labs Reviewed   URINALYSIS WITH CULTURE REFLEX - Abnormal; Notable for the following components:       Result Value    Spec  Gravity >1.030 (*)     Protein Urine 20 (*)     Urobilinogen Urine 2 (*)     All other components within normal limits   COMP METABOLIC PANEL (14) - Abnormal; Notable for the following components:    Glucose 100 (*)     All other components within normal limits   CBC WITH DIFFERENTIAL WITH PLATELET    Narrative:     The following orders were created for panel order CBC With Differential With Platelet.  Procedure                               Abnormality         Status                     ---------                               -----------         ------                     CBC W/ DIFFERENTIAL[[id number]]                              Final result                 Please view results for these tests on the individual orders.   RAINBOW DRAW LAVENDER   RAINBOW DRAW LIGHT GREEN   CBC W/ DIFFERENTIAL             MDM      The differential includes the following  Sciatica, lumbar strain, unlikely fracture but I have considered those.    Pertinent comorbidities include  Anxiety, depression, degenerative disease, sciatica, peripheral neuropathy secondary to heavy alcohol use    Pertinent social history includes  As listed below      Imaging studies  I reviewed the images and my independent interpreation after review is no evidence of fracture or dislocation. Additionaly, I reviewd the radiology report that states the following findings of djd on lumbar spine xray    External data reviewed  Prior MRI from 2022 as detailed above     Discussion of management with external providers    ER course  Patient is slightly hypertensive but otherwise hemodynamically stable.  Does have some mild right mid lower lumbar spine tenderness to palpation but no palpable step-offs and more so right paraspinal lumbar tenderness to palpation and a positive straight leg test on the right.  Patient tells me he has had numerous MRIs of his back which the latest I can see is from 2022 detailing multilevel degenerative disc disease and compression  deformity of T6.  Patient here has been given morphine along with Toradol.  He is asked for an x-ray of his right knee which I feel is reasonable along with his back given his recent fall.    X-rays ultimately negative.  His pain improved with his interventions here.  Patient has been given steroids and Norco for severe breakthrough pain.  He is also been given a referral to Dr. Mejía who is on-call for spine today with very strict return precautions.          Medical Decision Making      Disposition and Plan     Clinical Impression:  1. Sciatica of right side         Disposition:  Discharge  4/21/2024  7:06 pm    Follow-up:  Deric Millan MD  [address] [facility] [address] [phone]    Follow up      Kashif Ross MD  [address]   [address] [address] [phone]    Follow up            Medications Prescribed:  Discharge Medication List as of 4/21/2024  7:17 PM        START taking these medications    Details   methylPREDNISolone 4 MG Oral Tablet Therapy Pack Take as directed on dose pack instructions, Normal, Disp-21 tablet, R-0      HYDROcodone-acetaminophen 5-325 MG Oral Tab Take 1-2 tablets by mouth every 6 (six) hours as needed for Pain., Normal, Disp-10 tablet, R-0

## 2024-04-21 NOTE — ED INITIAL ASSESSMENT (HPI)
Pt to ED via walk in c/o R lower back pain since having a fall 3 weeks ago. Pt reports pain has become unbearable, feels like a radiating burning sensation. Pt also c/o urinary frequency. A&Ox4.

## 2024-04-24 ENCOUNTER — OFFICE VISIT (OUTPATIENT)
Dept: SURGERY | Facility: CLINIC | Age: 64
End: 2024-04-24
Payer: MEDICARE

## 2024-04-24 VITALS — HEART RATE: 87 BPM | SYSTOLIC BLOOD PRESSURE: 138 MMHG | DIASTOLIC BLOOD PRESSURE: 74 MMHG | OXYGEN SATURATION: 96 %

## 2024-04-24 DIAGNOSIS — M54.16 LUMBAR BACK PAIN WITH RADICULOPATHY AFFECTING RIGHT LOWER EXTREMITY: Primary | ICD-10-CM

## 2024-04-24 DIAGNOSIS — M54.31 SCIATICA OF RIGHT SIDE: ICD-10-CM

## 2024-04-24 PROCEDURE — 99205 OFFICE O/P NEW HI 60 MIN: CPT

## 2024-04-24 RX ORDER — ERGOCALCIFEROL 1.25 MG/1
1 CAPSULE ORAL
COMMUNITY

## 2024-04-24 RX ORDER — CLOPIDOGREL BISULFATE 75 MG
TABLET ORAL
COMMUNITY
Start: 2023-01-24

## 2024-04-24 RX ORDER — CARIPRAZINE 1.5 MG/1
1 CAPSULE, GELATIN COATED ORAL
COMMUNITY
Start: 2024-03-28

## 2024-04-24 RX ORDER — SERTRALINE HCL 50 MG
1 TABLET ORAL DAILY
COMMUNITY

## 2024-04-24 RX ORDER — NORTRIPTYLINE HYDROCHLORIDE 25 MG/1
25 CAPSULE ORAL DAILY
COMMUNITY
Start: 2023-11-30

## 2024-04-24 RX ORDER — NORTRIPTYLINE HYDROCHLORIDE 50 MG/1
50 CAPSULE ORAL DAILY
COMMUNITY
Start: 2024-02-15

## 2024-04-24 RX ORDER — TAMSULOSIN HYDROCHLORIDE 0.4 MG/1
1 CAPSULE ORAL
COMMUNITY
Start: 2024-04-17

## 2024-04-24 RX ORDER — TIZANIDINE 4 MG/1
TABLET ORAL
COMMUNITY

## 2024-04-24 NOTE — PROGRESS NOTES
New patient is here today for follow up to ED visit on 2024 Sciatica of right side      Imagin24 XR Lumbar Spine    Treatments: Medrol pk had to stop due to interaction with other medication.  Norco 5-325 mg w/ some relief.    Pain Scale:  7/10

## 2024-04-24 NOTE — PROGRESS NOTES
[facility]  Neurosurgery Clinic Visit    Tong León     1960   MRN [id number]     Location [facility], [facility] PCP Deric Millan MD       Reason for Visit:  Low back pain    SUBJECTIVE     Tong León is a pleasant 64 year old male who presents for Neurosurgical consultation following ED visit for LBP on 24.  Patient reports intermittent episodes of right-sided LBP since Lena. Denies trauma or inciting event. Episodes typically resolve with stretching, Norco, and muscle relaxants as prescribed by his PCP. One week ago, pt lifted a 60-lb box of mayela shingles which acutely exacerbated his pain. His pain continued to progress thus he presented to ED. XR lumbar spine showed disc heigh loss at L2-3 and L5-S1. Pt remained without neurological deficits and demonstrated improvement in pain with medications in ED, thus was sent home with a medrol pack and Norco.     Today, Tong states his pain has somewhat improved. He continues to report right-sided LBP with intermittent radiation down the posterior RLE, from hip to ankle. He has burning in both feet secondary to neuropathy, R > L. He is taking Norco 1-2 times daily with relief. He stopped Medrol as he felt it was making him depressed. His PCP switched him to baclofen, which he feels is helping. He has no new numbness or tingling in LE. He has no weakness. No changes in bowel/bladder habits or saddle anesthesia. No neck pain or numbness/tingling/weakness of UE. He has no difficulty ambulating.     He has completed multiple rounds of dedicated spine PT in the past, to no relief.   Received one LESI 10-20 years ago (believes it was at L5-S1), without relief.   MRI L spine from 2022 shows multilevel DDD and DJD and prior T6 compression fracture    MEDICAL HISTORY     Past Medical History:    Anxiety    Depression    Enlarged prostate    NIDIA (obstructive sleep apnea)    Other and unspecified hyperlipidemia     Unspecified essential hypertension      Past Surgical History:   Procedure Laterality Date    Stent placemt retro carotid Right       No family history on file.   Social History     Socioeconomic History    Marital status:    Tobacco Use    Smoking status: Former    Smokeless tobacco: Never   Vaping Use    Vaping status: Never Used   Substance and Sexual Activity    Alcohol use: Not Currently    Drug use: Never   Other Topics Concern    Caffeine Concern Yes     Comment: iced tea    Exercise Yes     Comment: walking      No Known Allergies     MEDICATIONS     Cannot display prior to admission medications because the patient has not been admitted in this contact.      (Not in a hospital admission)    REVIEW OF SYSTEMS     Comprehensive Review of Systems obtained, and is negative other than that mentioned in the History of Present Illness.    Denies fever, chills, shortness of breath or chest pain.    PHYSICAL EXAMINATION     VITALS: /74   Pulse 87   SpO2 96%     GENERAL: No acute distress, non-toxic appearing, mood appropriate. Sitting uncomfortably in exam chair; needed to lay on exam table during visit.     MUSCULOSKELETAL: Even tone. Moving bilateral upper and lower extremities spontaneously and against resistance. Positive (R) SLR, negative (L) SLR.    NEURO: Alert and oriented x3.  Sensation to light touch is intact bilaterally. Gait intact, non-antalgic. Reports decreased strength on right toe balance. Intact heel balance bilaterally, reports equal strength. BRENNAN x 4. Gait deferred. Negative Canseco's. No clonus.    UPPER EXTREMITY STRENGTH:    Deltoid  Biceps  Triceps     Finger abduction     Right 5 5 5 5 5     Left 5 5 5 5 5     LOWER EXTREMITY STRENGTH:    Iliospoas  Hamstrings  Quads  D-flexion  P-flexion EHL     Right 5 5 5 5 5 5     Left 5 5 5 5 5 5     DTRs:     Biceps    Triceps   Brachioradialis     Patellar     Ankle     Right       2+         2+            2+         2+        2+      Left       2+         2+             2+         2+        2+      IMAGING     XR LUMBAR SPINE (MIN 2 VIEWS) (CPT=72100)    Result Date: 4/21/2024  CONCLUSION:  No acute radiographic findings in the lumbar spine.   LOCATION:  [facility]   Dictated by (CST): Campos Granados MD on 4/21/2024 at 6:13 PM     Finalized by (CST): Campos Granados MD on 4/21/2024 at 6:14 PM         ASSESSMENT AND PLAN     ASSESSMENT:   1. Lumbar back pain with radiculopathy affecting right lower extremity    2. Sciatica of right side      Had a long discussion with patient regarding management of acute on chronic LBP. He declines additional PT at this time, as he feels he has exhausted this in the past without relief. He has no interest in pursuing additional injections. For now, will obtain new imaging given acute onset of severe LBP with radicular symptoms. Patient states he is amenable to a Physiatry referral for additional conservative treatment recommendations, pending review of his imaging.     PLAN:   MRI lumbar spine  XR lumbar flex/ex  Short course of Norco provided, as patient states his PCP will not provide a refill until Friday and he has 2 doses remaining. Discussed that I will not continue to provide refills of this medication, to which he voiced understanding.   Rx for Meloxicam  Short course of 15 tabs, given risk of interaction with antidepressants.  Patient advised to inform this provider of how medication works for him.  Instructed to take with food to minimize GI upset.  Instructed not to take other NSAIDs while on this medication.  Follow up after imaging to review and discuss.  Instructed to return to the ED with any new or worsening symptoms    Tamiko Quinones, APRN-NP  [address], [address]  [place], [place] [id number] [phone]

## 2024-04-24 NOTE — PATIENT INSTRUCTIONS
Refill policies:    Allow 2-3 business days for refills; controlled substances may take longer.  Contact your pharmacy at least 5 days prior to running out of medication and have them send an electronic request or submit request through the “request refill” option in your [employer] account.  Refills are not addressed on weekends; covering physicians do not authorize routine medications on weekends.  No narcotics or controlled substances are refilled after noon on Fridays or by on call physicians.  By law, narcotics must be electronically prescribed.  A 30 day supply with no refills is the maximum allowed.  If your prescription is due for a refill, you may be due for a follow up appointment.  To best provide you care, patients receiving routine medications need to be seen at least once a year.  Patients receiving narcotic/controlled substance medications need to be seen at least once every 3 months.  In the event that your preferred pharmacy does not have the requested medication in stock (e.g. Backordered), it is your responsibility to find another pharmacy that has the requested medication available.  We will gladly send a new prescription to that pharmacy at your request.    Scheduling Tests:    If your physician has ordered radiology tests such as MRI or CT scans, please contact Central Scheduling at [phone] right away to schedule the test.  Once scheduled, the [facility] Centralized Referral Team will work with your insurance carrier to obtain pre-certification or prior authorization.  Depending on your insurance carrier, approval may take 3-10 days.  It is highly recommended patients assure they have received an authorization before having a test performed.  If test is done without insurance authorization, patient may be responsible for the entire amount billed.      Precertification and Prior Authorizations:  If your physician has recommended that you have a procedure or additional testing performed the [facility]  Centralized Referral Team will contact your insurance carrier to obtain pre-certification or prior authorization.    You are strongly encouraged to contact your insurance carrier to verify that your procedure/test has been approved and is a COVERED benefit.  Although the [facility] Centralized Referral Team does its due diligence, the insurance carrier gives the disclaimer that \"Although the procedure is authorized, this does not guarantee payment.\"    Ultimately the patient is responsible for payment.   Thank you for your understanding in this matter.  Paperwork Completion:  If you require FMLA or disability paperwork for your recovery, please make sure to either drop it off or have it faxed to our office at [phone]. Be sure the form has your name and date of birth on it.  The form will be faxed to our Forms Department and they will complete it for you.  There is a 25$ fee for all forms that need to be filled out.  Please be aware there is a 10-14 day turnaround time.  You will need to sign a release of information (ZOILA) form if your paperwork does not come with one.  You may call the Forms Department with any questions at [phone].  Their fax number is [phone].

## 2024-04-25 RX ORDER — HYDROCODONE BITARTRATE AND ACETAMINOPHEN 5; 325 MG/1; MG/1
1 TABLET ORAL EVERY 6 HOURS PRN
Qty: 15 TABLET | Refills: 0 | Status: SHIPPED | OUTPATIENT
Start: 2024-04-25 | End: 2024-04-30

## 2024-04-25 RX ORDER — MELOXICAM 7.5 MG/1
7.5 TABLET ORAL DAILY
Qty: 15 TABLET | Refills: 0 | Status: SHIPPED | OUTPATIENT
Start: 2024-04-25

## 2024-04-28 ENCOUNTER — APPOINTMENT (OUTPATIENT)
Dept: GENERAL RADIOLOGY | Facility: HOSPITAL | Age: 64
End: 2024-04-28
Attending: EMERGENCY MEDICINE
Payer: MEDICARE

## 2024-04-28 ENCOUNTER — HOSPITAL ENCOUNTER (EMERGENCY)
Facility: HOSPITAL | Age: 64
Discharge: HOME | End: 2024-04-28
Attending: EMERGENCY MEDICINE
Payer: MEDICARE

## 2024-04-28 VITALS
HEART RATE: 73 BPM | WEIGHT: 249.81 LBS | BODY MASS INDEX: 35.76 KG/M2 | DIASTOLIC BLOOD PRESSURE: 86 MMHG | TEMPERATURE: 98 F | HEIGHT: 70 IN | RESPIRATION RATE: 14 BRPM | SYSTOLIC BLOOD PRESSURE: 138 MMHG | OXYGEN SATURATION: 95 %

## 2024-04-28 DIAGNOSIS — R31.9 URINARY TRACT INFECTION WITH HEMATURIA, SITE UNSPECIFIED: Primary | ICD-10-CM

## 2024-04-28 DIAGNOSIS — N39.0 URINARY TRACT INFECTION WITH HEMATURIA, SITE UNSPECIFIED: Primary | ICD-10-CM

## 2024-04-28 DIAGNOSIS — F41.1 ANXIETY REACTION: ICD-10-CM

## 2024-04-28 LAB
ALBUMIN SERPL-MCNC: 4.4 G/DL (ref 3.4–5)
ALBUMIN/GLOB SERPL: 1.4 {RATIO} (ref 1–2)
ALP LIVER SERPL-CCNC: 72 U/L
ALT SERPL-CCNC: 34 U/L
ANION GAP SERPL CALC-SCNC: 8 MMOL/L (ref 0–18)
AST SERPL-CCNC: 26 U/L (ref 15–37)
ATRIAL RATE: 84 BPM
BASOPHILS # BLD AUTO: 0.03 X10(3) UL (ref 0–0.2)
BASOPHILS NFR BLD AUTO: 0.4 %
BILIRUB SERPL-MCNC: 0.4 MG/DL (ref 0.1–2)
BILIRUB UR QL STRIP.AUTO: NEGATIVE
BUN BLD-MCNC: 30 MG/DL (ref 9–23)
CALCIUM BLD-MCNC: 9.4 MG/DL (ref 8.5–10.1)
CHLORIDE SERPL-SCNC: 105 MMOL/L (ref 98–112)
CLARITY UR REFRACT.AUTO: CLEAR
CO2 SERPL-SCNC: 24 MMOL/L (ref 21–32)
CREAT BLD-MCNC: 0.78 MG/DL
D DIMER PPP FEU-MCNC: <0.27 UG/ML FEU (ref ?–0.64)
EGFRCR SERPLBLD CKD-EPI 2021: 100 ML/MIN/1.73M2 (ref 60–?)
EOSINOPHIL # BLD AUTO: 0.06 X10(3) UL (ref 0–0.7)
EOSINOPHIL NFR BLD AUTO: 0.8 %
ERYTHROCYTE [DISTWIDTH] IN BLOOD BY AUTOMATED COUNT: 14.3 %
GLOBULIN PLAS-MCNC: 3.1 G/DL (ref 2.8–4.4)
GLUCOSE BLD-MCNC: 107 MG/DL (ref 70–99)
GLUCOSE UR STRIP.AUTO-MCNC: 100 MG/DL
HCT VFR BLD AUTO: 51.7 %
HGB BLD-MCNC: 16.4 G/DL
IMM GRANULOCYTES # BLD AUTO: 0.02 X10(3) UL (ref 0–1)
IMM GRANULOCYTES NFR BLD: 0.3 %
KETONES UR STRIP.AUTO-MCNC: 15 MG/DL
LYMPHOCYTES # BLD AUTO: 1.65 X10(3) UL (ref 1–4)
LYMPHOCYTES NFR BLD AUTO: 22.7 %
MCH RBC QN AUTO: 29.9 PG (ref 26–34)
MCHC RBC AUTO-ENTMCNC: 31.7 G/DL (ref 31–37)
MCV RBC AUTO: 94.2 FL
MONOCYTES # BLD AUTO: 0.43 X10(3) UL (ref 0.1–1)
MONOCYTES NFR BLD AUTO: 5.9 %
NEUTROPHILS # BLD AUTO: 5.08 X10 (3) UL (ref 1.5–7.7)
NEUTROPHILS # BLD AUTO: 5.08 X10(3) UL (ref 1.5–7.7)
NEUTROPHILS NFR BLD AUTO: 69.9 %
NITRITE UR QL STRIP.AUTO: POSITIVE
NT-PROBNP SERPL-MCNC: 94 PG/ML (ref ?–125)
OSMOLALITY SERPL CALC.SUM OF ELEC: 291 MOSM/KG (ref 275–295)
P AXIS: 54 DEGREES
P-R INTERVAL: 140 MS
PH UR STRIP.AUTO: 5 [PH] (ref 5–8)
PLATELET # BLD AUTO: 211 10(3)UL (ref 150–450)
POTASSIUM SERPL-SCNC: 4 MMOL/L (ref 3.5–5.1)
PROT SERPL-MCNC: 7.5 G/DL (ref 6.4–8.2)
Q-T INTERVAL: 382 MS
QRS DURATION: 90 MS
QTC CALCULATION (BEZET): 451 MS
R AXIS: 13 DEGREES
RBC # BLD AUTO: 5.49 X10(6)UL
RBC UR QL AUTO: NEGATIVE
SODIUM SERPL-SCNC: 137 MMOL/L (ref 136–145)
SP GR UR STRIP.AUTO: >=1.03 (ref 1–1.03)
T AXIS: 8 DEGREES
TROPONIN I SERPL HS-MCNC: 6 NG/L
TROPONIN I SERPL HS-MCNC: 7 NG/L
UROBILINOGEN UR STRIP.AUTO-MCNC: 2 MG/DL
VENTRICULAR RATE: 84 BPM
WBC # BLD AUTO: 7.3 X10(3) UL (ref 4–11)

## 2024-04-28 PROCEDURE — 81001 URINALYSIS AUTO W/SCOPE: CPT | Performed by: EMERGENCY MEDICINE

## 2024-04-28 PROCEDURE — 85025 COMPLETE CBC W/AUTO DIFF WBC: CPT | Performed by: EMERGENCY MEDICINE

## 2024-04-28 PROCEDURE — 71045 X-RAY EXAM CHEST 1 VIEW: CPT | Performed by: EMERGENCY MEDICINE

## 2024-04-28 PROCEDURE — 87086 URINE CULTURE/COLONY COUNT: CPT | Performed by: EMERGENCY MEDICINE

## 2024-04-28 PROCEDURE — 84484 ASSAY OF TROPONIN QUANT: CPT | Performed by: EMERGENCY MEDICINE

## 2024-04-28 PROCEDURE — 85379 FIBRIN DEGRADATION QUANT: CPT | Performed by: EMERGENCY MEDICINE

## 2024-04-28 PROCEDURE — 83880 ASSAY OF NATRIURETIC PEPTIDE: CPT | Performed by: EMERGENCY MEDICINE

## 2024-04-28 PROCEDURE — 93005 ELECTROCARDIOGRAM TRACING: CPT

## 2024-04-28 PROCEDURE — 99284 EMERGENCY DEPT VISIT MOD MDM: CPT

## 2024-04-28 PROCEDURE — 99285 EMERGENCY DEPT VISIT HI MDM: CPT

## 2024-04-28 PROCEDURE — 81015 MICROSCOPIC EXAM OF URINE: CPT | Performed by: EMERGENCY MEDICINE

## 2024-04-28 PROCEDURE — 93010 ELECTROCARDIOGRAM REPORT: CPT

## 2024-04-28 PROCEDURE — 36415 COLL VENOUS BLD VENIPUNCTURE: CPT

## 2024-04-28 PROCEDURE — 80053 COMPREHEN METABOLIC PANEL: CPT | Performed by: EMERGENCY MEDICINE

## 2024-04-28 RX ORDER — CEPHALEXIN 500 MG/1
500 CAPSULE ORAL 2 TIMES DAILY
Qty: 20 CAPSULE | Refills: 0 | Status: SHIPPED | OUTPATIENT
Start: 2024-04-28 | End: 2024-05-01

## 2024-04-28 RX ORDER — LORAZEPAM 1 MG/1
1 TABLET ORAL ONCE
Status: COMPLETED | OUTPATIENT
Start: 2024-04-28 | End: 2024-04-28

## 2024-04-28 NOTE — ED INITIAL ASSESSMENT (HPI)
Complaining of anxiety  and shortness of breath  since last night  patient is already on  ciprofloxacin  and pyridium for UTI . Patient said all this symptoms started  after taking this medications  unable to sit still . Able to talk .

## 2024-04-28 NOTE — ED PROVIDER NOTES
Patient Seen in: [facility] Emergency Department      History     Chief Complaint   Patient presents with    Medication Reaction     Stated Complaint: started new medication and is now feeling anxious, can't sit still, short of br*    Subjective:   HPI    This is a 64-year-old male with history of hypertension, depression, enlarged prostate, anxiety presents emergency room with chief complaint of anxiety, patient states he was recently diagnosed with urinary tract infection, initially was placed on Bactrim but this was switched yesterday to Cipro by his physician.  Patient was also given Pyridium.  Patient states after taking 1 dose of the antibiotic today he started to feel anxious, states he feels like he cannot calm down.  States this feels the same as previous panic attacks, does report history of anxiety.  Patient denies any chest pain, states he feels short of breath like his typical anxiety attack, denies any exertional symptoms.  Denies any pleuritic type chest pain denies tearing type chest or abdominal pain denies any lower extremity swelling or calf pain denies PND orthopnea.  Denies cough sore throat or runny nose and denies any fevers or chills.  Denies abdominal pain denies back or flank pain.  States that he has had some urinary frequency with slight dysuria recently.  Denies penile lesions or discharge.    Objective:   Past Medical History:    Anxiety    Depression    Enlarged prostate    NIDIA (obstructive sleep apnea)    Other and unspecified hyperlipidemia    Unspecified essential hypertension              Past Surgical History:   Procedure Laterality Date    Stent placemt retro carotid Right                 Social History     Socioeconomic History    Marital status:    Tobacco Use    Smoking status: Former    Smokeless tobacco: Never   Vaping Use    Vaping status: Never Used   Substance and Sexual Activity    Alcohol use: Yes     Comment: last drink 4 am    Drug use: Never   Other  Topics Concern    Caffeine Concern Yes     Comment: iced tea    Exercise Yes     Comment: walking     Social Determinants of Health      Received from [employer]    Food Insecurity   Physical Activity: Low Risk  (3/6/2023)    Received from [employer]    Exercise Vital Sign     On average, how many days per week do you engage in moderate to strenuous exercise (like a brisk walk)?: 5 days     On average, how many minutes do you engage in exercise at this level?: 120 min    Received from [employer]    Social Connections              Review of Systems    Positive for stated complaint: started new medication and is now feeling anxious, can't sit still, short of br*  Other systems are as noted in HPI.  Constitutional and vital signs reviewed.      All other systems reviewed and negative except as noted above.    Physical Exam     ED Triage Vitals [04/28/24 1323]   BP (!) 176/95   Pulse 90   Resp 18   Temp 98.3 °F (36.8 °C)   Temp src Temporal   SpO2 95 %   O2 Device None (Room air)       Current:/86   Pulse 73   Temp 98.3 °F (36.8 °C) (Temporal)   Resp 14   Ht 177.8 cm (5' 10\")   Wt 113.3 kg   SpO2 95%   BMI 35.84 kg/m²         Physical Exam    GENERAL: Patient is awake, alert,in no acute distress.  HEENT:  no scleral icterus.  Mucous membranes are slightly dry, oropharynx is clear, uvula midline.    NECK: Neck is supple, there is no nuchal rigidity.   HEART: Regular rate and rhythm, no murmurs.  LUNGS: Clear to auscultation bilaterally.  No Rales, no rhonchi, no wheezing, no stridor.  ABDOMEN: Soft, nondistended,non tender,  no rebound, no rigidity, no guarding.no pulsatile masses. No CVA tenderness  EXTREMITIES: No peripheral edema, no calf tenderness,    ED Course     Labs Reviewed   COMP METABOLIC PANEL (14) - Abnormal; Notable for the following components:       Result Value    Glucose 107 (*)     BUN 30 (*)     All other components within normal limits   URINALYSIS,  ROUTINE - Abnormal; Notable for the following components:    Glucose Urine 100 (*)     Ketones Urine 15 (*)     Protein Urine 100 mg/dL (*)     Urobilinogen Urine 2.0 (*)     Nitrite Urine Positive (*)     Leukocyte Esterase Urine Large (*)     RBC Urine 3-5 (*)     Squamous Epi. Cells Few (*)     All other components within normal limits   UA MICROSCOPIC ONLY, URINE - Abnormal; Notable for the following components:    RBC Urine 3-5 (*)     Squamous Epi. Cells Few (*)     All other components within normal limits   TROPONIN I HIGH SENSITIVITY - Normal   PRO BETA NATRIURETIC PEPTIDE - Normal   D-DIMER - Normal   TROPONIN I HIGH SENSITIVITY - Normal   CBC WITH DIFFERENTIAL WITH PLATELET    Narrative:     The following orders were created for panel order CBC With Differential With Platelet.  Procedure                               Abnormality         Status                     ---------                               -----------         ------                     CBC W/ DIFFERENTIAL[[id number]]                              Final result                 Please view results for these tests on the individual orders.   RAINBOW DRAW LAVENDER   RAINBOW DRAW LIGHT GREEN   RAINBOW DRAW BLUE   URINE CULTURE, ROUTINE   CBC W/ DIFFERENTIAL     EKG    Rate, intervals and axes as noted on EKG Report.  Rate: 84  Rhythm: Sinus Rhythm  Reading: Normal sinus rhythm, no ST elevation                          MDM        Differential diagnosis before testing includes but not limited to pneumonia, pneumothorax, pulmonary edema, pleural effusion, pulmonary embolism, electrolyte abnormality, anxiety reaction, which is a medical condition that poses a threat to life/function    Radiographic images  I personally reviewed the radiographs and my individual interpretation shows chest x-ray no pneumothorax  I also reviewed the official reports that showed chest x-ray normal heart size and vascularity.      External chart review included chart  reviewed, no urine culture results available    Medications Provided: Ativan    Course of Events during Emergency Room Visit include patient placed on cardiac monitor and pulse ox.  Given IV fluid hydration and Ativan for anxiety.  CBC and chemistry were unremarkable.  BNP 94.  2 sets of cardiac enzymes/troponin were unremarkable.  D-dimer less than 0.27.  Urinalysis positive nitrate large leukocyte esterase.  On reevaluation patient states he is feeling much better, I discussed with patient on review of literature fluoroquinolones/Cipro has been noted to cause exacerbation of anxiety in patients with underlying anxiety disorder.  Patient therefore will be switched from Cipro to Keflex and urine culture was ordered.  Patient structured to stay well-hydrated, follow-up with his primary care physician, return to ER if any change or worsening symptoms.  Patient well-appearing agrees plan was discharged good condition    Shared decision making was utilized           Discharge  I have discussed with the patient the results of test, differential diagnosis, treatment plan, warning signs and symptoms which should prompt immediate return.  They expressed understanding of these instructions and agrees to the following plan provided.  They were given written discharge instructions and agrees to return for any concerns and voiced understanding and all questions were answered.    Note to patient: The 21st Century Cures Act makes medical notes like these available to patients in the interest of transparency. However, this is a medical document intended as peer to peer communication. It is written in medical language and may contain abbreviations or verbiage that are unfamiliar. It may appear blunt or direct. Medical documents are intended to carry relevant information, facts as evident, and the clinical opinion of the practitioner.                                            Medical Decision Making      Disposition and Plan      Clinical Impression:  1. Urinary tract infection with hematuria, site unspecified    2. Anxiety reaction         Disposition:  Discharge  4/28/2024  4:14 pm    Follow-up:  Deric Millan MD  [address] [facility] [address]  [phone]    Follow up in 2 day(s)            Medications Prescribed:  Discharge Medication List as of 4/28/2024  4:24 PM        START taking these medications    Details   cephalexin 500 MG Oral Cap Take 1 capsule (500 mg total) by mouth 2 (two) times daily for 10 days., Normal, Disp-20 capsule, R-0

## 2024-04-29 ENCOUNTER — HOSPITAL ENCOUNTER (INPATIENT)
Facility: HOSPITAL | Age: 64
LOS: 1 days | Discharge: HOME | End: 2024-05-01
Attending: EMERGENCY MEDICINE | Admitting: HOSPITALIST
Payer: MEDICARE

## 2024-04-29 ENCOUNTER — HOSPITAL ENCOUNTER (EMERGENCY)
Facility: HOSPITAL | Age: 64
Discharge: HOME | End: 2024-04-29
Attending: EMERGENCY MEDICINE
Payer: MEDICARE

## 2024-04-29 ENCOUNTER — APPOINTMENT (OUTPATIENT)
Dept: CT IMAGING | Facility: HOSPITAL | Age: 64
End: 2024-04-29
Attending: EMERGENCY MEDICINE
Payer: MEDICARE

## 2024-04-29 VITALS
DIASTOLIC BLOOD PRESSURE: 81 MMHG | HEIGHT: 70 IN | RESPIRATION RATE: 20 BRPM | SYSTOLIC BLOOD PRESSURE: 143 MMHG | OXYGEN SATURATION: 95 % | BODY MASS INDEX: 35.63 KG/M2 | TEMPERATURE: 98 F | WEIGHT: 248.88 LBS | HEART RATE: 90 BPM

## 2024-04-29 VITALS
HEART RATE: 77 BPM | HEIGHT: 70 IN | WEIGHT: 249 LBS | BODY MASS INDEX: 35.65 KG/M2 | DIASTOLIC BLOOD PRESSURE: 87 MMHG | SYSTOLIC BLOOD PRESSURE: 126 MMHG | OXYGEN SATURATION: 96 % | RESPIRATION RATE: 15 BRPM | TEMPERATURE: 98 F

## 2024-04-29 DIAGNOSIS — R33.9 URINARY RETENTION: Primary | ICD-10-CM

## 2024-04-29 DIAGNOSIS — R31.9 URINARY TRACT INFECTION WITH HEMATURIA, SITE UNSPECIFIED: ICD-10-CM

## 2024-04-29 DIAGNOSIS — N39.0 URINARY TRACT INFECTION WITHOUT HEMATURIA, SITE UNSPECIFIED: ICD-10-CM

## 2024-04-29 DIAGNOSIS — M54.9 BACK PAIN WITHOUT RADIATION: ICD-10-CM

## 2024-04-29 DIAGNOSIS — R39.9 COMPLAINT ABOUT URINARY CATHETER: Primary | ICD-10-CM

## 2024-04-29 DIAGNOSIS — R33.9 URINARY RETENTION: ICD-10-CM

## 2024-04-29 DIAGNOSIS — R10.9 ABDOMINAL PAIN, ACUTE: ICD-10-CM

## 2024-04-29 DIAGNOSIS — Z97.8 COMPLAINT ABOUT URINARY CATHETER: Primary | ICD-10-CM

## 2024-04-29 DIAGNOSIS — N39.0 URINARY TRACT INFECTION WITH HEMATURIA, SITE UNSPECIFIED: ICD-10-CM

## 2024-04-29 PROBLEM — R73.9 HYPERGLYCEMIA: Status: ACTIVE | Noted: 2024-04-29

## 2024-04-29 PROBLEM — N30.01 ACUTE CYSTITIS WITH HEMATURIA: Status: ACTIVE | Noted: 2024-04-29

## 2024-04-29 PROBLEM — R79.89 AZOTEMIA: Status: ACTIVE | Noted: 2024-04-29

## 2024-04-29 LAB
ALBUMIN SERPL-MCNC: 4 G/DL (ref 3.4–5)
ALBUMIN/GLOB SERPL: 1.2 {RATIO} (ref 1–2)
ALP LIVER SERPL-CCNC: 70 U/L
ALT SERPL-CCNC: 54 U/L
ANION GAP SERPL CALC-SCNC: 3 MMOL/L (ref 0–18)
AST SERPL-CCNC: 44 U/L (ref 15–37)
BASOPHILS # BLD AUTO: 0.02 X10(3) UL (ref 0–0.2)
BASOPHILS NFR BLD AUTO: 0.2 %
BILIRUB SERPL-MCNC: 0.8 MG/DL (ref 0.1–2)
BUN BLD-MCNC: 31 MG/DL (ref 9–23)
CALCIUM BLD-MCNC: 9.3 MG/DL (ref 8.5–10.1)
CHLORIDE SERPL-SCNC: 105 MMOL/L (ref 98–112)
CO2 SERPL-SCNC: 28 MMOL/L (ref 21–32)
CREAT BLD-MCNC: 0.92 MG/DL
EGFRCR SERPLBLD CKD-EPI 2021: 93 ML/MIN/1.73M2 (ref 60–?)
EOSINOPHIL # BLD AUTO: 0.04 X10(3) UL (ref 0–0.7)
EOSINOPHIL NFR BLD AUTO: 0.3 %
ERYTHROCYTE [DISTWIDTH] IN BLOOD BY AUTOMATED COUNT: 14.6 %
GLOBULIN PLAS-MCNC: 3.4 G/DL (ref 2.8–4.4)
GLUCOSE BLD-MCNC: 105 MG/DL (ref 70–99)
GLUCOSE UR STRIP.AUTO-MCNC: NEGATIVE MG/DL
HCT VFR BLD AUTO: 50.9 %
HGB BLD-MCNC: 16.7 G/DL
IMM GRANULOCYTES # BLD AUTO: 0.04 X10(3) UL (ref 0–1)
IMM GRANULOCYTES NFR BLD: 0.3 %
KETONES UR STRIP.AUTO-MCNC: 15 MG/DL
LACTATE SERPL-SCNC: 1.2 MMOL/L (ref 0.4–2)
LEUKOCYTE ESTERASE UR QL STRIP.AUTO: NEGATIVE
LYMPHOCYTES # BLD AUTO: 1.53 X10(3) UL (ref 1–4)
LYMPHOCYTES NFR BLD AUTO: 12 %
MCH RBC QN AUTO: 30.6 PG (ref 26–34)
MCHC RBC AUTO-ENTMCNC: 32.8 G/DL (ref 31–37)
MCV RBC AUTO: 93.4 FL
MONOCYTES # BLD AUTO: 1.01 X10(3) UL (ref 0.1–1)
MONOCYTES NFR BLD AUTO: 7.9 %
NEUTROPHILS # BLD AUTO: 10.1 X10 (3) UL (ref 1.5–7.7)
NEUTROPHILS # BLD AUTO: 10.1 X10(3) UL (ref 1.5–7.7)
NEUTROPHILS NFR BLD AUTO: 79.3 %
NITRITE UR QL STRIP.AUTO: POSITIVE
OSMOLALITY SERPL CALC.SUM OF ELEC: 289 MOSM/KG (ref 275–295)
PH UR STRIP.AUTO: 6.5 [PH] (ref 5–8)
PLATELET # BLD AUTO: 192 10(3)UL (ref 150–450)
PLATELETS.RETICULATED NFR BLD AUTO: 5.1 % (ref 0–7)
POTASSIUM SERPL-SCNC: 4.6 MMOL/L (ref 3.5–5.1)
PROT SERPL-MCNC: 7.4 G/DL (ref 6.4–8.2)
PROT UR STRIP.AUTO-MCNC: >=300 MG/DL
RBC # BLD AUTO: 5.45 X10(6)UL
RBC #/AREA URNS AUTO: >10 /HPF
RBC #/AREA URNS AUTO: >10 /HPF
SODIUM SERPL-SCNC: 136 MMOL/L (ref 136–145)
SP GR UR STRIP.AUTO: >=1.03 (ref 1–1.03)
UROBILINOGEN UR STRIP.AUTO-MCNC: 0.2 MG/DL
WBC # BLD AUTO: 12.7 X10(3) UL (ref 4–11)

## 2024-04-29 PROCEDURE — 51702 INSERT TEMP BLADDER CATH: CPT

## 2024-04-29 PROCEDURE — 99283 EMERGENCY DEPT VISIT LOW MDM: CPT

## 2024-04-29 PROCEDURE — 74176 CT ABD & PELVIS W/O CONTRAST: CPT | Performed by: EMERGENCY MEDICINE

## 2024-04-29 PROCEDURE — 99223 1ST HOSP IP/OBS HIGH 75: CPT | Performed by: HOSPITALIST

## 2024-04-29 RX ORDER — LIDOCAINE HYDROCHLORIDE 20 MG/ML
10 JELLY TOPICAL ONCE
Status: COMPLETED | OUTPATIENT
Start: 2024-04-29 | End: 2024-04-29

## 2024-04-29 RX ORDER — KETOROLAC TROMETHAMINE 15 MG/ML
15 INJECTION, SOLUTION INTRAMUSCULAR; INTRAVENOUS ONCE
Status: COMPLETED | OUTPATIENT
Start: 2024-04-29 | End: 2024-04-29

## 2024-04-29 RX ORDER — PHENAZOPYRIDINE HYDROCHLORIDE 200 MG/1
200 TABLET, FILM COATED ORAL 3 TIMES DAILY PRN
Qty: 6 TABLET | Refills: 0 | Status: ON HOLD | OUTPATIENT
Start: 2024-04-29 | End: 2024-05-06

## 2024-04-29 RX ORDER — PHENAZOPYRIDINE HYDROCHLORIDE 200 MG/1
200 TABLET, FILM COATED ORAL ONCE
Status: DISCONTINUED | OUTPATIENT
Start: 2024-04-29 | End: 2024-04-29

## 2024-04-29 RX ORDER — HYDROCODONE BITARTRATE AND ACETAMINOPHEN 5; 325 MG/1; MG/1
1 TABLET ORAL ONCE
Status: COMPLETED | OUTPATIENT
Start: 2024-04-29 | End: 2024-04-29

## 2024-04-29 RX ORDER — SODIUM CHLORIDE 9 MG/ML
INJECTION, SOLUTION INTRAVENOUS CONTINUOUS
Status: DISCONTINUED | OUTPATIENT
Start: 2024-04-29 | End: 2024-05-01

## 2024-04-29 RX ORDER — CEPHALEXIN 500 MG/1
500 CAPSULE ORAL ONCE
Status: COMPLETED | OUTPATIENT
Start: 2024-04-29 | End: 2024-04-29

## 2024-04-29 NOTE — ED INITIAL ASSESSMENT (HPI)
Patient c/o frequency and urgency but inability to start flow of urine \"going 40x an hour\" Patient seen earlier today for adverse reaction to antibiotic \" had anxiety reaction\"  Patient states recently diagnosed with UTI.

## 2024-04-29 NOTE — ED INITIAL ASSESSMENT (HPI)
Pt was here yesterday for UTI with jenkins catheter placed and went home. Pt then returned back to ED for discomfort and had them remove the jenkins catheter around 4 am. Pt left hospital around 5 am. Pt urinated twice before 7 am. Pt went to pcp office this morning and was sent back to ED for admission and septic work up. Denies fevers. Pt has not picked up abx from pharmacy. Pt feels distended. Pt is a/o x4.

## 2024-04-29 NOTE — DISCHARGE INSTRUCTIONS
Make sure drink plenty of fluids I will call urology office to follow-up over the next couple days return for worsening symptoms

## 2024-04-29 NOTE — ED INITIAL ASSESSMENT (HPI)
Pt to ED requesting for jenkins cath to removed because \"it's too painful.\" Pt states \"I just want to see if I can pee on my own.\" Pt seen in the ED today for Urinary Retention and discharged at 0415 AM.  Pt presents with jenkins cath draining to leg bag with 100 ml urine output noted.

## 2024-04-29 NOTE — ED PROVIDER NOTES
Patient Seen in: [facility] Emergency Department      History     Chief Complaint   Patient presents with    Urinary Retention     Stated Complaint: here yesterday and had jenkins placed, had them remove d/t discomfort, sent by PM*    Subjective:   HPI  Patient is a 64-year-old male who states for the past week he has had some dysuria urgency and frequency.  Patient states he was diagnosed with a UTI yesterday.  Patient states he did have a dose of Keflex.  Patient states he had a catheter placed this morning and states he could not tolerate it and came back and had it removed this morning.  Patient went to his primary doctor complaining of suprapubic pain unable to void and abdominal pain was sent back to the emerged part for further evaluation.  Patient denies fevers or chills, no vomiting, no diarrhea.  Patient denies frequent UTIs states he may have had 1 as a child.  Patient was also seen a week ago for right-sided back pain had x-rays at that time.  Remainder of review of systems negative.    Objective:   Past Medical History:    Anxiety    Depression    Enlarged prostate    NIDIA (obstructive sleep apnea)    Other and unspecified hyperlipidemia    Unspecified essential hypertension    Urinary retention              Past Surgical History:   Procedure Laterality Date    Stent placemt retro carotid Right                 Social History     Socioeconomic History    Marital status:    Tobacco Use    Smoking status: Former    Smokeless tobacco: Never   Vaping Use    Vaping status: Never Used   Substance and Sexual Activity    Alcohol use: Yes     Comment: last drink 4 am    Drug use: Never   Other Topics Concern    Caffeine Concern Yes     Comment: iced tea    Exercise Yes     Comment: walking     Social Determinants of Health      Received from [employer]    Food Insecurity   Physical Activity: Low Risk  (3/6/2023)    Received from [employer]    Exercise Vital Sign     On average,  how many days per week do you engage in moderate to strenuous exercise (like a brisk walk)?: 5 days     On average, how many minutes do you engage in exercise at this level?: 120 min    Received from [facility]    [facility]              Review of Systems    Positive for stated complaint: here yesterday and had jenkins placed, had them remove d/t discomfort, sent by PM*  Other systems are as noted in HPI.  Constitutional and vital signs reviewed.      All other systems reviewed and negative except as noted above.    Physical Exam     ED Triage Vitals [04/29/24 1652]   /81   Pulse 102   Resp 20   Temp 97.7 °F (36.5 °C)   Temp src Oral   SpO2 94 %   O2 Device None (Room air)       Current:/87   Pulse 87   Temp 97.7 °F (36.5 °C) (Oral)   Resp 18   Ht 177.8 cm (5' 10\")   Wt 68 kg   SpO2 97%   BMI 21.52 kg/m²         Physical Exam  GENERAL: Patient resting on the cart in no acute distress.  HEENT: Extraocular muscles intact, pupils equal round reactive to light, neck supple, no meningismus.  LUNGS: Lungs clear to auscultation bilaterally.  CARDIOVASCULAR: + S1-S2, regular rate and rhythm, no murmurs.  BACK: No CVA tenderness, no midline bony tenderness.  ABDOMEN: + Bowel sounds, soft, moderate tenderness suprapubic, nondistended.  No rebound, no guarding, no hepatosplenomegaly.  EXTREMITIES: Full range of motion, no tenderness, good capillary refill.  SKIN: No rash, good turgor.  NEURO: Patient answers questions appropriately.  No focal deficits appreciated.           ED Course     Labs Reviewed   COMP METABOLIC PANEL (14) - Abnormal; Notable for the following components:       Result Value    Glucose 105 (*)     BUN 31 (*)     AST 44 (*)     All other components within normal limits   URINALYSIS WITH CULTURE REFLEX - Abnormal; Notable for the following components:    Urine Color Red (*)     Clarity Urine Cloudy (*)     Bilirubin Urine Small (*)     Ketones Urine 15 (*)     Blood  Urine Large (*)     Protein Urine >=300 (*)     Nitrite Urine Positive (*)     WBC Urine 6-10 (*)     RBC Urine >10 (*)     All other components within normal limits   UA MICROSCOPIC ONLY, URINE - Abnormal; Notable for the following components:    WBC Urine 6-10 (*)     RBC Urine >10 (*)     All other components within normal limits   CBC W/ DIFFERENTIAL - Abnormal; Notable for the following components:    WBC 12.7 (*)     Neutrophil Absolute Prelim 10.10 (*)     Neutrophil Absolute 10.10 (*)     Monocyte Absolute 1.01 (*)     All other components within normal limits   LACTIC ACID, PLASMA - Normal   CBC WITH DIFFERENTIAL WITH PLATELET    Narrative:     The following orders were created for panel order CBC With Differential With Platelet.  Procedure                               Abnormality         Status                     ---------                               -----------         ------                     CBC W/ DIFFERENTIAL[[id number]]          Abnormal            Final result                 Please view results for these tests on the individual orders.   URINE CULTURE, ROUTINE                      MDM      Patient did have a bladder scan with 775 mL of urine.  Patient request something for pain.  Patient given Toradol.  Patient with back pain, dysuria will have CT to rule out kidney stone.  Patient CT did show stones in the kidney but not into the ureter.  Patient was given a dose of IV Rocephin for likely UTI.  Patient is a note from his primary physician wanting him admitted to see urology.  Patient does not feel comfortable going home.  Patient has had 3 prior visits to the emerged part in the past 24 hours and this is his fourth visit.  Patient will be admitted for further evaluation.  Patient does have significant hematuria on his Leon catheter.  Patient was discussed with Gildardo mirandaist.  I did consider kidney stone, UTI, hematuria, urinary retention.  Admission disposition: 4/29/2024  9:43 PM                                         Medical Decision Making      Disposition and Plan     Clinical Impression:  1. Urinary retention    2. Urinary tract infection with hematuria, site unspecified    3. Abdominal pain, acute    4. Back pain without radiation         Disposition:  Admit  4/29/2024  9:43 pm    Follow-up:  No follow-up provider specified.        Medications Prescribed:  Current Discharge Medication List                            Hospital Problems       Present on Admission  Date Reviewed: 4/25/2024            ICD-10-CM Noted POA    * (Principal) Urinary retention R33.9 4/29/2024 Unknown    Acute cystitis with hematuria N30.01 4/29/2024 Unknown    Azotemia R79.89 4/29/2024 Yes    Hematuria R31.9 4/29/2024 Unknown    Hyperglycemia R73.9 4/29/2024 Yes

## 2024-04-29 NOTE — ED PROVIDER NOTES
Patient Seen in: [facility] Emergency Department      History     Chief Complaint   Patient presents with    Cath Tube Problem     Stated Complaint: pt requesting for jenkins cath to be removed, too painful    Subjective:   HPI    64-year-old male here because he wants his catheter out.  The patient was seen on Saturday initially in the afternoon about 36 hours ago at that time for frequent urination on was started on Keflex for urinary tract infection came back last night with difficulty urinating had urinary retention a catheter was placed went home but returns now because of pain and burning sensation at the tip of his penis and he states he does not feel he can tolerate catheter and wants it out.    Objective:   Past Medical History:    Anxiety    Depression    Enlarged prostate    NIDIA (obstructive sleep apnea)    Other and unspecified hyperlipidemia    Unspecified essential hypertension    Urinary retention              Past Surgical History:   Procedure Laterality Date    Stent placemt retro carotid Right                 Social History     Socioeconomic History    Marital status:    Tobacco Use    Smoking status: Former    Smokeless tobacco: Never   Vaping Use    Vaping status: Never Used   Substance and Sexual Activity    Alcohol use: Yes     Comment: last drink 4 am    Drug use: Never   Other Topics Concern    Caffeine Concern Yes     Comment: iced tea    Exercise Yes     Comment: walking     Social Determinants of Health      Received from [employer]    Food Insecurity   Physical Activity: Low Risk  (3/6/2023)    Received from [employer]    Exercise Vital Sign     On average, how many days per week do you engage in moderate to strenuous exercise (like a brisk walk)?: 5 days     On average, how many minutes do you engage in exercise at this level?: 120 min    Received from [employer]    Social Connections              Review of Systems    Positive for stated  complaint: pt requesting for jenkins cath to be removed, too painful  Other systems are as noted in HPI.  Constitutional and vital signs reviewed.      All other systems reviewed and negative except as noted above.    Physical Exam     ED Triage Vitals [04/29/24 0522]   /81   Pulse 90   Resp 20   Temp 97.7 °F (36.5 °C)   Temp src Oral   SpO2 95 %   O2 Device None (Room air)       Current:/81   Pulse 90   Temp 97.7 °F (36.5 °C) (Oral)   Resp 20   Ht 177.8 cm (5' 10\")   Wt 112.9 kg   SpO2 95%   BMI 35.71 kg/m²         Physical Exam    Patient is alert orient x 3 no acute distress abdomen soft and nontender Jenkins catheter is in place there is orange appearing urine within the leg bag consistent with Pyridium intake.    ED Course   Labs Reviewed - No data to display                   MDM      Initial differential diagnosis includes urinary tract infection urinary retention prostatic hypertrophy catheter complication.  Patient was advised of possibility of urinary retention understands accepts this risk was given follow-up for urology was given Pyridium and catheter was removed.                                     MDM    Disposition and Plan     Clinical Impression:  1. Complaint about urinary catheter    2. Urinary retention    3. Urinary tract infection without hematuria, site unspecified         Disposition:  Discharge  4/29/2024  6:12 am    Follow-up:  Raul Urias MD  [address]  [address]  [facility] [address] [phone]    Follow up in 2 day(s)            Medications Prescribed:  Current Discharge Medication List        START taking these medications    Details   phenazopyridine 200 MG Oral Tab Take 1 tablet (200 mg total) by mouth 3 (three) times daily as needed for Pain.  Qty: 6 tablet, Refills: 0

## 2024-04-29 NOTE — ED PROVIDER NOTES
Patient Seen in: [facility] Emergency Department      History     Chief Complaint   Patient presents with    Urinary Symptoms     Stated Complaint: urinary frequency unable to empty bladder completely    Subjective:   HPI    64-year-old male past medical history anxiety enlarged prostate NIDIA presents ED with complaints of urinary frequency.  Reports that he is gone to the bathroom 40 times has  has been unable to clear out his bladder.  Denies any nausea vomiting.  He was diagnosed with UTI earlier today and discharged home with Keflex has not picked up the prescription.  No fever or chills.    Objective:   Past Medical History:    Anxiety    Depression    Enlarged prostate    NIDIA (obstructive sleep apnea)    Other and unspecified hyperlipidemia    Unspecified essential hypertension              Past Surgical History:   Procedure Laterality Date    Stent placemt retro carotid Right                 Social History     Socioeconomic History    Marital status:    Tobacco Use    Smoking status: Former    Smokeless tobacco: Never   Vaping Use    Vaping status: Never Used   Substance and Sexual Activity    Alcohol use: Yes     Comment: last drink 4 am    Drug use: Never   Other Topics Concern    Caffeine Concern Yes     Comment: iced tea    Exercise Yes     Comment: walking     Social Determinants of Health      Received from [employer]    Food Insecurity   Physical Activity: Low Risk  (3/6/2023)    Received from [employer]    Exercise Vital Sign     On average, how many days per week do you engage in moderate to strenuous exercise (like a brisk walk)?: 5 days     On average, how many minutes do you engage in exercise at this level?: 120 min    Received from [employer]    Social Connections              Review of Systems    Positive for stated complaint: urinary frequency unable to empty bladder completely  Other systems are as noted in HPI.  Constitutional and vital signs  reviewed.      All other systems reviewed and negative except as noted above.    Physical Exam     ED Triage Vitals [04/29/24 0037]   /82   Pulse 86   Resp 18   Temp 97.6 °F (36.4 °C)   Temp src    SpO2 95 %   O2 Device None (Room air)       Current:/87   Pulse 82   Temp 97.6 °F (36.4 °C)   Resp 18   Ht 177.8 cm (5' 10\")   Wt 112.9 kg   SpO2 94%   BMI 35.73 kg/m²         Physical Exam  Vitals and nursing note reviewed.   Constitutional:       Appearance: He is well-developed.   HENT:      Head: Normocephalic and atraumatic.   Eyes:      Pupils: Pupils are equal, round, and reactive to light.   Cardiovascular:      Rate and Rhythm: Normal rate and regular rhythm.   Pulmonary:      Effort: Pulmonary effort is normal.      Breath sounds: Normal breath sounds.   Abdominal:      General: Bowel sounds are normal.      Palpations: Abdomen is soft.   Musculoskeletal:         General: No deformity.      Cervical back: Normal range of motion and neck supple.   Skin:     General: Skin is warm and dry.      Capillary Refill: Capillary refill takes less than 2 seconds.   Neurological:      Mental Status: He is alert and oriented to person, place, and time.               ED Course   Labs Reviewed - No data to display                   MDM      64-year-old male who presents ED with complaints of urinary frequency.  Vital signs stable arrival patient appears nontoxic examination abdomen soft nontender no rebound guarding or rigidity.  Postvoid residual greater than 200 will place Leon catheter.  Leon catheter placed patient admits to feeling significant improved dose Keflex given here as patient missed his second dose of Keflex.  Discussed close follow-up with urology strict return precautions as well.  Patient was understand the plan and is in agreement plan discharged home in stable condition.                                   Medical Decision Making      Disposition and Plan     Clinical Impression:  1.  Urinary retention         Disposition:  Discharge  4/29/2024  3:03 am    Follow-up:  Mesfin Millan MD  [address] [facility] [address]  [phone]    Call in 1 day(s)      Raul Urias MD  [address]  [address]  [facility] [id number] [phone]    Call in 1 day(s)            Medications Prescribed:  Current Discharge Medication List

## 2024-04-29 NOTE — DISCHARGE INSTRUCTIONS
Please call urology in the morning schedule an appointment to be seen within the week.  Drink lots of fluids throughout the day.  Please follow-up with your primary care physician 1-2 days return to the ER if your symptoms worsen progress or if you have any further concerns.

## 2024-04-29 NOTE — ED QUICK NOTES
Patient requested to have jenkins catheter removed d/t pain, patient educated on need for catheter but requested it be removed.

## 2024-04-30 PROBLEM — M54.9 BACK PAIN WITHOUT RADIATION: Status: ACTIVE | Noted: 2024-04-30

## 2024-04-30 PROBLEM — N39.0 URINARY TRACT INFECTION WITH HEMATURIA, SITE UNSPECIFIED: Status: ACTIVE | Noted: 2024-04-30

## 2024-04-30 PROBLEM — R10.9 ABDOMINAL PAIN, ACUTE: Status: ACTIVE | Noted: 2024-04-30

## 2024-04-30 PROBLEM — R31.9 URINARY TRACT INFECTION WITH HEMATURIA, SITE UNSPECIFIED: Status: ACTIVE | Noted: 2024-04-30

## 2024-04-30 LAB
ANION GAP SERPL CALC-SCNC: 3 MMOL/L (ref 0–18)
BASOPHILS # BLD AUTO: 0.02 X10(3) UL (ref 0–0.2)
BASOPHILS NFR BLD AUTO: 0.2 %
BUN BLD-MCNC: 25 MG/DL (ref 9–23)
CALCIUM BLD-MCNC: 8.9 MG/DL (ref 8.5–10.1)
CHLORIDE SERPL-SCNC: 107 MMOL/L (ref 98–112)
CO2 SERPL-SCNC: 29 MMOL/L (ref 21–32)
CREAT BLD-MCNC: 1 MG/DL
EGFRCR SERPLBLD CKD-EPI 2021: 84 ML/MIN/1.73M2 (ref 60–?)
EOSINOPHIL # BLD AUTO: 0.07 X10(3) UL (ref 0–0.7)
EOSINOPHIL NFR BLD AUTO: 0.8 %
ERYTHROCYTE [DISTWIDTH] IN BLOOD BY AUTOMATED COUNT: 14.7 %
GLUCOSE BLD-MCNC: 105 MG/DL (ref 70–99)
HCT VFR BLD AUTO: 47.4 %
HGB BLD-MCNC: 15.2 G/DL
IMM GRANULOCYTES # BLD AUTO: 0.03 X10(3) UL (ref 0–1)
IMM GRANULOCYTES NFR BLD: 0.3 %
LYMPHOCYTES # BLD AUTO: 1.67 X10(3) UL (ref 1–4)
LYMPHOCYTES NFR BLD AUTO: 18.3 %
MCH RBC QN AUTO: 30.2 PG (ref 26–34)
MCHC RBC AUTO-ENTMCNC: 32.1 G/DL (ref 31–37)
MCV RBC AUTO: 94.2 FL
MONOCYTES # BLD AUTO: 0.73 X10(3) UL (ref 0.1–1)
MONOCYTES NFR BLD AUTO: 8 %
NEUTROPHILS # BLD AUTO: 6.61 X10 (3) UL (ref 1.5–7.7)
NEUTROPHILS # BLD AUTO: 6.61 X10(3) UL (ref 1.5–7.7)
NEUTROPHILS NFR BLD AUTO: 72.4 %
OSMOLALITY SERPL CALC.SUM OF ELEC: 293 MOSM/KG (ref 275–295)
PLATELET # BLD AUTO: 188 10(3)UL (ref 150–450)
POTASSIUM SERPL-SCNC: 4.4 MMOL/L (ref 3.5–5.1)
RBC # BLD AUTO: 5.03 X10(6)UL
SODIUM SERPL-SCNC: 139 MMOL/L (ref 136–145)
WBC # BLD AUTO: 9.1 X10(3) UL (ref 4–11)

## 2024-04-30 PROCEDURE — 99232 SBSQ HOSP IP/OBS MODERATE 35: CPT | Performed by: HOSPITALIST

## 2024-04-30 RX ORDER — HYDROXYZINE HYDROCHLORIDE 25 MG/1
25 TABLET, FILM COATED ORAL 3 TIMES DAILY PRN
Status: DISCONTINUED | OUTPATIENT
Start: 2024-04-30 | End: 2024-05-01

## 2024-04-30 RX ORDER — HYDROCODONE BITARTRATE AND ACETAMINOPHEN 5; 325 MG/1; MG/1
1 TABLET ORAL EVERY 4 HOURS PRN
Status: DISCONTINUED | OUTPATIENT
Start: 2024-04-30 | End: 2024-05-01

## 2024-04-30 RX ORDER — LOSARTAN POTASSIUM 50 MG/1
50 TABLET ORAL DAILY
Status: DISCONTINUED | OUTPATIENT
Start: 2024-04-30 | End: 2024-05-01

## 2024-04-30 RX ORDER — ACETAMINOPHEN 325 MG/1
650 TABLET ORAL EVERY 4 HOURS PRN
Status: DISCONTINUED | OUTPATIENT
Start: 2024-04-30 | End: 2024-05-01

## 2024-04-30 RX ORDER — TAMSULOSIN HYDROCHLORIDE 0.4 MG/1
0.4 CAPSULE ORAL
Status: DISCONTINUED | OUTPATIENT
Start: 2024-04-30 | End: 2024-05-01

## 2024-04-30 RX ORDER — CLONAZEPAM 0.5 MG/1
2 TABLET ORAL 3 TIMES DAILY PRN
Status: DISCONTINUED | OUTPATIENT
Start: 2024-04-30 | End: 2024-04-30

## 2024-04-30 RX ORDER — CLONAZEPAM 0.5 MG/1
2 TABLET ORAL NIGHTLY
Status: DISCONTINUED | OUTPATIENT
Start: 2024-04-30 | End: 2024-04-30

## 2024-04-30 RX ORDER — CLONAZEPAM 1 MG/1
2 TABLET ORAL 3 TIMES DAILY
Status: DISCONTINUED | OUTPATIENT
Start: 2024-04-30 | End: 2024-04-30

## 2024-04-30 RX ORDER — SENNOSIDES 8.6 MG
17.2 TABLET ORAL NIGHTLY PRN
Status: DISCONTINUED | OUTPATIENT
Start: 2024-04-30 | End: 2024-05-01

## 2024-04-30 RX ORDER — ONDANSETRON 2 MG/ML
4 INJECTION INTRAMUSCULAR; INTRAVENOUS EVERY 4 HOURS PRN
Status: ACTIVE | OUTPATIENT
Start: 2024-04-30 | End: 2024-04-30

## 2024-04-30 RX ORDER — ENEMA 19; 7 G/133ML; G/133ML
1 ENEMA RECTAL ONCE AS NEEDED
Status: DISCONTINUED | OUTPATIENT
Start: 2024-04-30 | End: 2024-05-01

## 2024-04-30 RX ORDER — POLYETHYLENE GLYCOL 3350 17 G/17G
17 POWDER, FOR SOLUTION ORAL DAILY PRN
Status: DISCONTINUED | OUTPATIENT
Start: 2024-04-30 | End: 2024-05-01

## 2024-04-30 RX ORDER — HYDROCODONE BITARTRATE AND ACETAMINOPHEN 5; 325 MG/1; MG/1
2 TABLET ORAL EVERY 4 HOURS PRN
Status: DISCONTINUED | OUTPATIENT
Start: 2024-04-30 | End: 2024-05-01

## 2024-04-30 RX ORDER — PROCHLORPERAZINE EDISYLATE 5 MG/ML
5 INJECTION INTRAMUSCULAR; INTRAVENOUS EVERY 8 HOURS PRN
Status: DISCONTINUED | OUTPATIENT
Start: 2024-04-30 | End: 2024-05-01

## 2024-04-30 RX ORDER — CLONAZEPAM 0.5 MG/1
2 TABLET ORAL NIGHTLY
Status: DISCONTINUED | OUTPATIENT
Start: 2024-04-30 | End: 2024-05-01

## 2024-04-30 RX ORDER — MELATONIN
3 NIGHTLY PRN
Status: DISCONTINUED | OUTPATIENT
Start: 2024-04-30 | End: 2024-05-01

## 2024-04-30 RX ORDER — NORTRIPTYLINE HYDROCHLORIDE 25 MG/1
25 CAPSULE ORAL DAILY
Status: DISCONTINUED | OUTPATIENT
Start: 2024-04-30 | End: 2024-05-01

## 2024-04-30 RX ORDER — CLONAZEPAM 0.5 MG/1
2 TABLET ORAL 3 TIMES DAILY
Status: DISCONTINUED | OUTPATIENT
Start: 2024-04-30 | End: 2024-04-30

## 2024-04-30 RX ORDER — PRAVASTATIN SODIUM 20 MG
20 TABLET ORAL DAILY
Status: DISCONTINUED | OUTPATIENT
Start: 2024-04-30 | End: 2024-05-01

## 2024-04-30 RX ORDER — SODIUM CHLORIDE 9 MG/ML
INJECTION, SOLUTION INTRAVENOUS CONTINUOUS
Status: ACTIVE | OUTPATIENT
Start: 2024-04-30 | End: 2024-04-30

## 2024-04-30 RX ORDER — ONDANSETRON 2 MG/ML
4 INJECTION INTRAMUSCULAR; INTRAVENOUS EVERY 6 HOURS PRN
Status: DISCONTINUED | OUTPATIENT
Start: 2024-04-30 | End: 2024-05-01

## 2024-04-30 RX ORDER — CLONAZEPAM 0.5 MG/1
2 TABLET ORAL 3 TIMES DAILY PRN
Status: DISCONTINUED | OUTPATIENT
Start: 2024-04-30 | End: 2024-05-01

## 2024-04-30 RX ORDER — BISACODYL 10 MG
10 SUPPOSITORY, RECTAL RECTAL
Status: DISCONTINUED | OUTPATIENT
Start: 2024-04-30 | End: 2024-05-01

## 2024-04-30 NOTE — SPIRITUAL CARE NOTE
Spiritual Care Visit Note    Patient Name: Tong León Date of Spiritual Care Visit: 24   : 1960 Primary Dx: Urinary retention       Referred By:      Spiritual Care Taxonomy:    Intended Effects: Convey a calming presence    Methods: Offer support    Interventions: Active listening;Acknowledge response to difficult experience;Ask guided questions    Visit Type/Summary:     - PoA: Other: Patient did not wish to receive advance directive information at this time.  assessed for other spiritual needs.   remains available for follow up.    Spiritual Care support can be requested via an Epic consult. For urgent/immediate needs, please contact the On Call  at: [facility]: ext [phone]    Trevin Brown MDiv, [facility]  Chaplain Resident  Ext: [phone]

## 2024-04-30 NOTE — PROGRESS NOTES
[facility]   part of [facility]     Hospitalist Progress Note     Tong León Patient Status:  Inpatient    1960 MRN [id number]   Location [facility] 3NE-A Attending Robb Patel MD   Hosp Day # 0 PCP Mesfin Millan MD     Chief Complaint: UR    Subjective:     Patient jenkins in place, clear    Objective:    Review of Systems:   A comprehensive review of systems was completed; pertinent positive and negatives stated in subjective.    Vital signs:  Temp:  [97.6 °F (36.4 °C)-98.1 °F (36.7 °C)] 97.6 °F (36.4 °C)  Pulse:  [] 73  Resp:  [15-30] 19  BP: (124-159)/(78-91) 126/83  SpO2:  [93 %-97 %] 93 %    Physical Exam:    General: No acute distress  Respiratory: No wheezes, no rhonchi  Cardiovascular: S1, S2, regular rate and rhythm  Abdomen: Soft, Non-tender, non-distended, positive bowel sounds  Neuro: No new focal deficits.   Extremities: No edema      Diagnostic Data:    Labs:  Recent Labs   Lab 24  1336 24  1842 24  0616   WBC 7.3 12.7* 9.1   HGB 16.4 16.7 15.2   MCV 94.2 93.4 94.2   .0 192.0 188.0       Recent Labs   Lab 24  1336 24  1842 24  0616   * 105* 105*   BUN 30* 31* 25*   CREATSERUM 0.78 0.92 1.00   CA 9.4 9.3 8.9   ALB 4.4 4.0  --     136 139   K 4.0 4.6 4.4    105 107   CO2 24.0 28.0 29.0   ALKPHO 72 70  --    AST 26 44*  --    ALT 34 54  --    BILT 0.4 0.8  --    TP 7.5 7.4  --        Estimated Creatinine Clearance: 77.1 mL/min (based on SCr of 1 mg/dL).    Recent Labs   Lab 24  1336 24  1518   TROPHS 6 7       No results for input(s): \"PTP\", \"INR\" in the last 168 hours.               Microbiology    No results found for this visit on 24.      Imaging: Reviewed in Epic.    Medications:    losartan  50 mg Oral Daily    nortriptyline  25 mg Oral Daily    pravastatin  20 mg Oral Daily    tamsulosin  0.4 mg Oral Daily @ 0700    sertraline  50 mg Oral Daily    cefTRIAXone  1 g Intravenous Q24H     clonazePAM  2 mg Oral Nightly       Assessment & Plan:      #Urinary retention  -Jenkins - clear  -Urology eval  -CBI clamped  -tamsulosin  -likely dc w/ jenkins     #Hematuria  -Suspect due to jenkins trauma  -Monitor     #UTI  -Rocephin pending culture     #BPH  -Flomax    #anxity  -klonopin prn  -trial hydroxyzine prn     #Essential HTN  -Continue home meds     #Carotid stenosis s/p TCAR 03/23  -Hold plavix for now given hematuria   -Statin     DISPO: dc tomorrow pending UCx    Robb Patel MD    Supplementary Documentation:     Quality:  DVT Mechanical Prophylaxis:   SCDs, Early ambuation  DVT Pharmacologic Prophylaxis   Medication   None                Code Status: Full Code  Jenkins: Jenkins catheter in place  Jenkins Duration (in days): 2  Central line:    LANDON:     Discharge is dependent on: course  At this point Mr. León is expected to be discharge to: home    The 21st Century Cures Act makes medical notes like these available to patients in the interest of transparency. Please be advised this is a medical document. Medical documents are intended to carry relevant information, facts as evident, and the clinical opinion of the practitioner. The medical note is intended as peer to peer communication and may appear blunt or direct. It is written in medical language and may contain abbreviations or verbiage that are unfamiliar.             **Certification      PHYSICIAN Certification of Need for Inpatient Hospitalization - Initial Certification    Patient will require inpatient services that will reasonably be expected to span two midnight's based on the clinical documentation in H+P.   Based on patients current state of illness, I anticipate that, after discharge, patient will require TBD.

## 2024-04-30 NOTE — PLAN OF CARE
Assumed care @ 1900  A&Ox4  RA   Leon catheter, secured   NSR on tele   PIV right antecubital 0.9  ml/hr  Ambulates  Bed in lowest position and call light in reach   Klonopin 2m given  Need further details reference flowsheets

## 2024-04-30 NOTE — CONSULTS
[facility]  Department of Urology   Consultation Note    Tong León Patient Status:  Inpatient    1960 MRN [id number]   Location [facility] 3NE-A Attending Padmini Crawford MD   Hosp Day # 0 PCP Mesfin Millan MD     Reason for Consultation:  GROSS HEMATURIA  URINARY RETENTION    History of Present Illness:  Tong León is a a(n) 64 year old male PMH of anxiety, depression, BPH, and carotid stenosis     Recently diagnosed with UTI - placed on Bactrim >  switched to Cipro by PCP    ER on 24 for UTI    UA 24: 1-5 WBC/hpf, 3-5 RBC/hpf, no bacteria, few squamous epithelial cells  Urine culture 24: no growth  DC with keflex    Presented to ER yesterday with urinary retention.  Jenkins catheter placed.      Returned to ER - wanted jenkins catheter out.  Jenkins catheter removed.    Returned to ER due to urinary retention.  Bladder scan 775 mL.  Jenkins catheter placed.  Hematuria noted.    Labs:  WBC 12.7 > 9.1  Hgb 16.7 > 15.2  Platelet count 192 > 188  Serum creat 0.92 > 1    UA 24: 6-10 WBC/hpf, >10 RBC/hpf, no bacteria  Urine culture 24: pending    Abdominal/pelvic CT scan without contrast 24:  CONCLUSION:    1. Two nonobstructing left renal calculi.   2. Uncomplicated colonic diverticulosis.     Urology was consulted.    Reports history of retention s/p surgery - required straight cath x 1 then was able to void.  No prostate surgery  No UTI  No saddle anesthesia  Has neuropathy    Increased urinary frequency, stranguria over the past week or so. No dysuria or hematuria.  Recently seen by PCP - PSA ok per patient.  Prostate US ordered by PCP.      Baseline urination:  Urinary frequency every 1-2 hours  No urinary urgency  Nocturia x 1  No nocturnal enuresis  No stranguria  No dysuria or hematuria    On plavix  Former smoker  No UTI  No TB  Brother with prostate cancer    History:  Past Medical History:    Anxiety    Back problem    Calculus of kidney     Depression    Enlarged prostate    High blood pressure    High cholesterol    NIDIA (obstructive sleep apnea)    Other and unspecified hyperlipidemia    Sleep apnea    Unspecified essential hypertension    Urinary retention    Visual impairment     Past Surgical History:   Procedure Laterality Date    Cath bare metal stent (bms)      Removal of lung,lobectomy      Stent placemt retro carotid Right      Family History   Problem Relation Age of Onset    Heart Disorder Mother     Diabetes Paternal Grandmother       reports that he has quit smoking. He has never used smokeless tobacco. He reports that he does not currently use alcohol. He reports that he does not use drugs.    Allergies:  No Known Allergies    Medications:    Current Facility-Administered Medications:     losartan (Cozaar) tab 50 mg, 50 mg, Oral, Daily    nortriptyline (Pamelor) cap 25 mg, 25 mg, Oral, Daily    pravastatin (Pravachol) tab 20 mg, 20 mg, Oral, Daily    tamsulosin (Flomax) cap 0.4 mg, 0.4 mg, Oral, Daily @ 0700    sertraline (Zoloft) tab 50 mg, 50 mg, Oral, Daily    melatonin tab 3 mg, 3 mg, Oral, Nightly PRN    polyethylene glycol (PEG 3350) (Miralax) 17 g oral packet 17 g, 17 g, Oral, Daily PRN    sennosides (Senokot) tab 17.2 mg, 17.2 mg, Oral, Nightly PRN    bisacodyl (Dulcolax) 10 MG rectal suppository 10 mg, 10 mg, Rectal, Daily PRN    fleet enema (Fleet) 7-19 GM/118ML rectal enema 133 mL, 1 enema, Rectal, Once PRN    ondansetron (Zofran) 4 MG/2ML injection 4 mg, 4 mg, Intravenous, Q6H PRN    prochlorperazine (Compazine) 10 MG/2ML injection 5 mg, 5 mg, Intravenous, Q8H PRN    acetaminophen (Tylenol) tab 650 mg, 650 mg, Oral, Q4H PRN **OR** HYDROcodone-acetaminophen (Norco) 5-325 MG per tab 1 tablet, 1 tablet, Oral, Q4H PRN **OR** HYDROcodone-acetaminophen (Norco) 5-325 MG per tab 2 tablet, 2 tablet, Oral, Q4H PRN    cefTRIAXone (Rocephin) 1 g in D5W 100 mL IVPB-ADD, 1 g, Intravenous, Q24H    clonazePAM (KlonoPIN) tab 2 mg, 2 mg, Oral,  Nightly    [COMPLETED] sodium chloride 0.9 % IV bolus 500 mL, 500 mL, Intravenous, Once **FOLLOWED BY** sodium chloride 0.9% infusion, , Intravenous, Continuous    Review of Systems:  Pertinent items are noted in HPI.    Physical Exam:  /89 (BP Location: Left arm)   Pulse 74   Temp 97.6 °F (36.4 °C) (Oral)   Resp (!) 30   Ht 5' 10\" (1.778 m)   Wt 250 lb 8 oz (113.6 kg)   SpO2 93%   BMI 35.94 kg/m²   GENERAL: the patient is resting in bed in no acute distress.   HEENT: Unremarkable.  NECK: Supple.   LUNGS: non-labored respirations.    ABDOMEN:  The abdomen is soft and nontender without rebound or guarding.   BACK: Without CVA tenderness.   GENITOURINARY: jenkins catheter in place draining blood tinged urine.   Moving all extremities      Laboratory Data:  Lab Results   Component Value Date    WBC 9.1 04/30/2024    HGB 15.2 04/30/2024    HCT 47.4 04/30/2024    .0 04/30/2024    CREATSERUM 1.00 04/30/2024    BUN 25 04/30/2024     04/30/2024    K 4.4 04/30/2024     04/30/2024    CO2 29.0 04/30/2024     04/30/2024    CA 8.9 04/30/2024    ALB 4.0 04/29/2024    ALKPHO 70 04/29/2024    BILT 0.8 04/29/2024    TP 7.4 04/29/2024    AST 44 04/29/2024    ALT 54 04/29/2024     WBC 12.7 > 9.1  Hgb 16.7 > 15.2  Platelet count 192 > 188  Serum creat 0.92 > 1    UA 4/28/24: 1-5 WBC/hpf, 3-5 RBC/hpf, no bacteria, few squamous epithelial cells  Urine culture 4/28/24: no growth     UA 4/29/24: 6-10 WBC/hpf, >10 RBC/hpf, no bacteria  Urine culture 4/29/24: pending    Imaging:    CT ABDOMEN+PELVIS KIDNEYSTONE 2D RNDR(NO IV,NO ORAL)(CPT=74176)    Result Date: 4/29/2024  CONCLUSION:  1. Two nonobstructing left renal calculi. 2. Uncomplicated colonic diverticulosis. 3. Details as above.  Continued clinical correlation recommended.     LOCATION:  [facility]   Dictated by (CST): Ronaldo Thorpe MD on 4/29/2024 at 8:26 PM     Finalized by (CST): Ronaldo Thorpe MD on 4/29/2024 at 8:31 PM       XR CHEST AP PORTABLE   (CPT=71045)    Result Date: 4/28/2024  CONCLUSION:  Normal heart size and pulmonary vascularity.  Slight atelectasis left lung base.   LOCATION:  [facility]      Dictated by (CST): Eleonora Barker MD on 4/28/2024 at 3:51 PM     Finalized by (CST): Eleonora Barker MD on 4/28/2024 at 3:51 PM       Impression:  Patient Active Problem List   Diagnosis    Pneumothorax, traumatic    Traumatic pneumothorax, initial encounter    Vertigo    Azotemia    Hyperglycemia    Urinary retention    Hematuria    Acute cystitis with hematuria    Urinary tract infection with hematuria, site unspecified    Abdominal pain, acute    Back pain without radiation       URINARY RETENTION  GROSS HEMATURIA IN THE SETTING OF LEON CATHETER/PLAVIX  WBC 12.7 > 9.1  Hgb 16.7 > 15.2  Platelet count 192 > 188  Serum creat 0.92 > 1  UA 4/29/24: 6-10 WBC/hpf, >10 RBC/hpf, no bacteria  Urine culture 4/29/24: pending  CT A/P without contrast 4/29/24: left urolithiasis, no hydronephrosis.      Recommendations:  Plavix on hold  Leon catheter hand irrigated easily.  No clots obtained.  Monitor color/clarity of urine.  Discussed possible need for CBI if worsening hematuria/clots.    CPM with tamsulosin  Minimize meds with anticholinergic effects  Obtain urine culture results from PCP if available.    Check final culture results  Follow labs  Voiding trial end of this week/early next week  If patient fails voiding trial, recommend he learn CIC  Office cystoscopy    Above discussed with patient, nurse.    Thank you for allowing me to participate in the care of your patient.    Hyun Reynolds PA-C  [facility]  Department of Urology  4/30/2024  7:16 AM

## 2024-04-30 NOTE — ED QUICK NOTES
Orders for admission, patient is aware of plan and ready to go upstairs. Any questions, please call ED RN Sabra at extension [phone].     Patient Covid vaccination status: Unvaccinated     COVID Test Ordered in ED: None    COVID Suspicion at Admission: N/A    Running Infusions:    sodium chloride 125 mL/hr at 04/29/24 1914        Mental Status/LOC at time of transport: A&Ox4    Other pertinent information:   CIWA score: N/A   NIH score:  N/A

## 2024-04-30 NOTE — PROGRESS NOTES
NURSING ADMISSION NOTE      Patient admitted via Cart  Oriented to room.  Safety precautions initiated.  Bed in low position.  Call light in reach.  Received A&xo4  PTA meds reviewed with patient   Navigator completed   Patient updated with POC

## 2024-04-30 NOTE — H&P
[facility]     Tong León Patient Status:  Emergency    1960 MRN [id number]   Location [facility] EMERGENCY DEPARTMENT Attending Chi Grossman MD   Hosp Day # 0 PCP Mesfin Millan MD     Chief Complaint: Urinary retention    Subjective:    History of Present Illness:     Tong León is a 64 year old male with a PMH of anxiety, depression, BPH, and carotid stenosis who presented to ED multiple times today due to urinary issues. Patient initially presented early this morning due to urinary frequency. Patient was found to be retaining urine and jenkins catheter was placed. Patient was discharged home. Patient became anxious about jenkins and came back to ED to have jenkins removed which was done. Patient went to PCP this afternoon due to inability to void and suprapubic pain. Patient was sent back to ED. Of note, patient was recently diagnosed with UTI OP and was taking keflex though had a reaction to antibiotic and stopped taking it.     History/Other:    Past Medical History:  Past Medical History:    Anxiety    Depression    Enlarged prostate    NIDIA (obstructive sleep apnea)    Other and unspecified hyperlipidemia    Unspecified essential hypertension    Urinary retention     Past Surgical History:   Past Surgical History:   Procedure Laterality Date    Stent placemt retro carotid Right       Family History:   History reviewed. No pertinent family history.  Social History:    reports that he has quit smoking. He has never used smokeless tobacco. He reports current alcohol use. He reports that he does not use drugs.     Allergies: No Known Allergies    Medications:    Current Facility-Administered Medications on File Prior to Encounter   Medication Dose Route Frequency Provider Last Rate Last Admin    [COMPLETED] lidocaine (Urojet) 2 % urethral jelly 10 mL  10 mL Urethral Once Zuleyka Matute, DO   10 mL at 24 0242    [COMPLETED] cephalexin (Keflex) cap 500 mg   500 mg Oral Once NobleZuleyka fox Gerri, DO   500 mg at 04/29/24 0406    [COMPLETED] LORazepam (Ativan) tab 1 mg  1 mg Oral Once Boubacar Arzola DO   1 mg at 04/28/24 1345    [COMPLETED] ketorolac (Toradol) 15 MG/ML injection 15 mg  15 mg Intravenous Once Blanca Mccarthy MD   15 mg at 04/21/24 1716    [COMPLETED] morphINE PF 4 MG/ML injection 4 mg  4 mg Intravenous Once Blanca Mccarthy MD   4 mg at 04/21/24 1716     Current Outpatient Medications on File Prior to Encounter   Medication Sig Dispense Refill    phenazopyridine 200 MG Oral Tab Take 1 tablet (200 mg total) by mouth 3 (three) times daily as needed for Pain. 6 tablet 0    cephalexin 500 MG Oral Cap Take 1 capsule (500 mg total) by mouth 2 (two) times daily for 10 days. 20 capsule 0    HYDROcodone-acetaminophen 5-325 MG Oral Tab Take 1 tablet by mouth every 6 (six) hours as needed for Pain. 15 tablet 0    Meloxicam 7.5 MG Oral Tab Take 1 tablet (7.5 mg total) by mouth daily. 15 tablet 0    VRAYLAR 1.5 MG Oral Cap 1 capsule.      PLAVIX 75 MG Oral Tab 1 tablet Orally Once a day for 90 day(s)      ergocalciferol 1.25 MG (85295 UT) Oral Cap 1 capsule (50,000 Units total).      nortriptyline 25 MG Oral Cap Take 1 capsule (25 mg total) by mouth daily.      nortriptyline 50 MG Oral Cap Take 1 capsule (50 mg total) by mouth daily.      ZOLOFT 50 MG Oral Tab Take 1 tablet (50 mg total) by mouth daily.      tamsulosin 0.4 MG Oral Cap 1 capsule (0.4 mg total).      tiZANidine 4 MG Oral Tab 1 tablet as needed Orally Three times a day for 10 days      losartan 50 MG Oral Tab Take 1 tablet (50 mg total) by mouth daily.      pregabalin 100 MG Oral Cap Take 1 capsule (100 mg total) by mouth 2 (two) times daily. (Patient not taking: Reported on 4/24/2024)      clonazePAM 2 MG Oral Tab Take 1 tablet (2 mg total) by mouth in the morning, at noon, and at bedtime.      pravastatin 20 MG Oral Tab Take 1 tablet (20 mg total) by mouth daily.      folic acid 1 MG Oral Tab Take 1 tablet (1  mg total) by mouth daily.      thiamine 100 MG Oral Tab Take 1 tablet (100 mg total) by mouth daily.      aspirin 81 MG Oral Tab EC Take 1 tablet (81 mg total) by mouth daily. (Patient not taking: Reported on 4/24/2024)      Multiple Vitamin (MULTI-VITAMIN DAILY) Oral Tab Take by mouth.         Review of Systems:   A comprehensive review of systems was completed.    Pertinent positives and negatives noted in the HPI.    Objective:   Physical Exam:    /87   Pulse 87   Temp 97.7 °F (36.5 °C) (Oral)   Resp 18   Ht 177.8 cm (5' 10\")   Wt 150 lb (68 kg)   SpO2 97%   BMI 21.52 kg/m²   General: No acute distress, Anxious.   Respiratory: No rhonchi, no wheezes  Cardiovascular: S1, S2. Regular rate and rhythm  Abdomen: Soft, Non-tender, non-distended, positive bowel sounds  Neuro: No new focal deficits  Extremities: No edema    Results:    Labs:      Labs Last 24 Hours:    Recent Labs   Lab 04/28/24  1336 04/29/24  1842   RBC 5.49 5.45   HGB 16.4 16.7   HCT 51.7 50.9   MCV 94.2 93.4   MCH 29.9 30.6   MCHC 31.7 32.8   RDW 14.3 14.6   NEPRELIM 5.08 10.10*   WBC 7.3 12.7*   .0 192.0       Recent Labs   Lab 04/28/24  1336 04/29/24  1842   * 105*   BUN 30* 31*   CREATSERUM 0.78 0.92   EGFRCR 100 93   CA 9.4 9.3   ALB 4.4 4.0    136   K 4.0 4.6    105   CO2 24.0 28.0   ALKPHO 72 70   AST 26 44*   ALT 34 54   BILT 0.4 0.8   TP 7.5 7.4       No results found for: \"PT\", \"INR\"    Recent Labs   Lab 04/28/24  1336 04/28/24  1518   TROPHS 6 7       Recent Labs   Lab 04/28/24  1336   PBNP 94       No results for input(s): \"PCT\" in the last 168 hours.    Imaging: Imaging data reviewed in Epic.    Assessment & Plan:      #Urinary retention  -Jenkins placed in ED  -Urology eval    #Hematuria  -Suspect due to jenkins trauma  -Monitor    #UTI  -Rocephin pending culture    #BPH  -Flomax    #Essential HTN  -Continue home meds    #Carotid stenosis s/p TCAR 03/23  -Hold plavix for now given hematuria   -Statin      Plan of care discussed with patient and ED physician.     Андрей Hansen,     Supplementary Documentation:     The 21st Century Cures Act makes medical notes like these available to patients in the interest of transparency. Please be advised this is a medical document. Medical documents are intended to carry relevant information, facts as evident, and the clinical opinion of the practitioner. The medical note is intended as peer to peer communication and may appear blunt or direct. It is written in medical language and may contain abbreviations or verbiage that are unfamiliar.

## 2024-04-30 NOTE — PLAN OF CARE
Assumed pt care at 0730. A&Ox4. VSS. Room air. NSR on tele. Jenkins catheter in place, draining pink urine with occasional clots. Urology to see. PRN norco given per MAR for pain at jenkins. Regular/cardiac diet, denies N/V. R AC PIV infusing 0.9NS @ 125 mL/hr. IV Rocephin q24. Up SBA. Pt updated with POC.     1600: left voicemail for PCP office in order to obtain urine cx results per order from urology.     Problem: Patient/Family Goals  Goal: Patient/Family Long Term Goal  Description: Patient's Long Term Goal: discharge  Interventions:  - urology c/s  - iv abx  - See additional Care Plan goals for specific interventions  Outcome: Progressing  Goal: Patient/Family Short Term Goal  Description: Patient's Short Term Goal: pain control   Interventions:   - prn pain medication  - See additional Care Plan goals for specific interventions  Outcome: Progressing

## 2024-04-30 NOTE — DISCHARGE INSTRUCTIONS
Caring for Your Catheter    A jenkins catheter has been placed into your bladder to drain your urine.  A small balloon in the catheter is inflated and keeps the catheter in your bladder.  Proper care of your catheter and the drainage bag can help to prevent infections.    Care of the Catheter:  The catheter should be securely taped to your thigh or abdomen to prevent pulling or increased tension on the urethra.  At least once daily, gently wash the catheter starting where the catheter enters your body and cleanse down the entire length of the catheter. Use a mild soap.  Do not use any astringents or antibacterial soap. It's best done in the shower. Make sure that any encrustations on the catheter are washed away.  Rinse well.   If you are an uncircumcised male, push the foreskin back to clean and after cleaning the catheter push the foreskin back to its normal position  Care of the Drainage Bag       Empty the drainage bag when it is ½ to 1/3 full.  The drainage bag must always be to gravity drainage and must be below the level of your bladder.  The bag should never be left lying on the floor.  Drainage bags should be cleaned each time you switch from a leg bag to a night drainage bag.  If you do not switch from a leg bag to a night drainage system, you should wash your drainage bag 1 x/week.  After disconnecting the tubing from the catheter, pour a solution of 1 part bleach to 10 parts water through the tubing and the bag. Rinse the inside and the outside of the drainage bag with water to remove all the bleach solution so that no injury to your skin will develop.  Empty the drainage bag when it is ½ to 1/3 full.  The drainage bag must always have gravity drainage and must be below the level of your bladder.  What To Do If You Leak Around the Catheter:  Check the connections between the catheter and the drainage system to make sure they are intact and not the source of the leak.  Make sure the catheter is securely  taped and holding the catheter securely and not pulling.  Readjust as necessary.  Make sure that the catheter is gravity drainage.  Call the office. You may be experiencing bladder spasms. You may be asked to come in and have the catheter checked or you may be given a medication that stops the spasms.  Special Instructions:  Increase your fluid intake so that your urine output is between 1500 and 2000cc’s/day.  Prevent constipation.  Increase daily fiber or use Miralax as needed.  Contact Our Office If:  If you see excessive blood or clots in your urine  If you have a temperature that is greater than 101.  If you don’t see any urine in your drainage bag after 3-4 hours.  Check the position of the drainage bag first and also make sure the catheter isn’t kinked.  If you have any burning, pain, purulent drainage or bleeding from around the  catheter site.  If you have persistent leaking around the catheter.     ----------  Doctor has asked you to schedule a Cystoscopy    This is a procedure done in the office.  There is no preparation needed on your part.    A Cystoscopy is a test that allows your doctor to look the inside the bladder and the urethra using a thin, lighted instrument called a cystoscope.  The cystoscope is inserted into your urethra and slowly advanced into the bladder. A cystoscopy allows your doctor to look at areas of your bladder and urethra that usually do not show up well on X-rays. Tiny surgical instruments can be inserted through the cystoscope that allow your doctor to remove samples of tissue (biopsy) or samples of urine.  Small bladder stones and some small growths can be removed during cystoscopy. This may eliminate the need for more extensive surgery.   Why It Is Done  Find the cause of symptoms such as blood in the urine (hematuria), painful urination (dysuria), urinary incontinence, urinary frequency or hesitancy, an inability to pass urine (retention), or a sudden and overwhelming need  to urinate (urgency).   Find the cause of problems of the urinary tract, such as frequent, repeated urinary tract infections or urinary tract infections that do not respond to treatment.   Look for problems in the urinary tract, such as blockage in the urethra caused by an enlarged prostate, kidney stones, or tumors.   Evaluate problems that cannot be seen on X-ray or to further investigate problems detected by ultrasound or during intravenous pyelography, such as kidney stones or tumors.   Remove tissue samples for biopsy.   Remove foreign objects.   Treat urinary tract problems. For example, cystoscopy can be done to remove urinary tract stones or growths, treat bleeding in the bladder, relieve blockages in the urethra, or treat or remove tumors.   How To Prepare  You may eat and drink normally before the procedure. You may be asked to give a urine sample at the time of your procedure. Please do not urinate before.  How It Is Done  A cystoscopy is performed by a urologist, with one or more assistants. The test is done in a special testing room in the doctor's office.  You will undress from the waist down, and be given a paper drape to cover yourself. You will lie on your back on a special table. Females will have their knees bent and feet placed in stirrups. Males will lay flat on the table, legs straight. Your genital area is cleaned with an antiseptic solution.  A local anesthetic jelly will be inserted into the urethra. No needles are used.   After the anesthetic takes effect, a well-lubricated cystoscope is inserted into your urethra and slowly advanced into your bladder. If your urethra has a spot that is too narrow to allow the scope to pass, other smaller instruments are inserted first to gradually enlarge the opening.  After the cystoscope is inside your bladder, sterile water runs through the scope to help expand your bladder and to create a clear view. Tiny instruments may be inserted through the scope  to collect tissue samples for a biopsy if necessary; the tissue samples are sent to the laboratory for analysis.  Cautery may be used if bleeding occurs from a specimen site.  The cystoscope is usually in your bladder for only 1-2 minutes. But the entire test with preparation may take up to 20 minutes or longer.  You will be able to get up immediately following the procedure and proceed with normal daily activities.   After the test  You may need to urinate frequently. You may experience some burning during urination for a day or two. Drink lots of fluids to help minimize the burning and help prevent a urinary tract infection. You may also see a tinge of blood in the urine for 2-3 days. Some patients experience pain for a few days afterwards.  This is normal.  If the pain is intolerable please contact our office or go to the nearest Emergency Room/Immediate Care.   You will be given an instruction sheet following your cystoscopy with post procedure instructions.   Results  Your doctor may be able to talk to you about the results during the cystoscopy. If a biopsy is preformed, the results usually take several days to become available. You will be called promptly with results.

## 2024-05-01 VITALS
OXYGEN SATURATION: 91 % | DIASTOLIC BLOOD PRESSURE: 89 MMHG | BODY MASS INDEX: 35.86 KG/M2 | WEIGHT: 250.5 LBS | RESPIRATION RATE: 20 BRPM | HEIGHT: 70 IN | HEART RATE: 100 BPM | SYSTOLIC BLOOD PRESSURE: 126 MMHG | TEMPERATURE: 98 F

## 2024-05-01 LAB
ANION GAP SERPL CALC-SCNC: 5 MMOL/L (ref 0–18)
BASOPHILS # BLD AUTO: 0.03 X10(3) UL (ref 0–0.2)
BASOPHILS NFR BLD AUTO: 0.4 %
BUN BLD-MCNC: 13 MG/DL (ref 9–23)
CALCIUM BLD-MCNC: 9.1 MG/DL (ref 8.5–10.1)
CHLORIDE SERPL-SCNC: 107 MMOL/L (ref 98–112)
CO2 SERPL-SCNC: 27 MMOL/L (ref 21–32)
CREAT BLD-MCNC: 0.8 MG/DL
EGFRCR SERPLBLD CKD-EPI 2021: 99 ML/MIN/1.73M2 (ref 60–?)
EOSINOPHIL # BLD AUTO: 0.04 X10(3) UL (ref 0–0.7)
EOSINOPHIL NFR BLD AUTO: 0.5 %
ERYTHROCYTE [DISTWIDTH] IN BLOOD BY AUTOMATED COUNT: 14.9 %
GLUCOSE BLD-MCNC: 98 MG/DL (ref 70–99)
HCT VFR BLD AUTO: 48.5 %
HGB BLD-MCNC: 16.2 G/DL
IMM GRANULOCYTES # BLD AUTO: 0.01 X10(3) UL (ref 0–1)
IMM GRANULOCYTES NFR BLD: 0.1 %
LYMPHOCYTES # BLD AUTO: 1.77 X10(3) UL (ref 1–4)
LYMPHOCYTES NFR BLD AUTO: 24.3 %
MCH RBC QN AUTO: 31.1 PG (ref 26–34)
MCHC RBC AUTO-ENTMCNC: 33.4 G/DL (ref 31–37)
MCV RBC AUTO: 93.1 FL
MONOCYTES # BLD AUTO: 0.54 X10(3) UL (ref 0.1–1)
MONOCYTES NFR BLD AUTO: 7.4 %
NEUTROPHILS # BLD AUTO: 4.9 X10 (3) UL (ref 1.5–7.7)
NEUTROPHILS # BLD AUTO: 4.9 X10(3) UL (ref 1.5–7.7)
NEUTROPHILS NFR BLD AUTO: 67.3 %
OSMOLALITY SERPL CALC.SUM OF ELEC: 288 MOSM/KG (ref 275–295)
PLATELET # BLD AUTO: 177 10(3)UL (ref 150–450)
POTASSIUM SERPL-SCNC: 4.1 MMOL/L (ref 3.5–5.1)
RBC # BLD AUTO: 5.21 X10(6)UL
SODIUM SERPL-SCNC: 139 MMOL/L (ref 136–145)
WBC # BLD AUTO: 7.3 X10(3) UL (ref 4–11)

## 2024-05-01 PROCEDURE — 99239 HOSP IP/OBS DSCHRG MGMT >30: CPT | Performed by: HOSPITALIST

## 2024-05-01 RX ORDER — HYDROCODONE BITARTRATE AND ACETAMINOPHEN 5; 325 MG/1; MG/1
1 TABLET ORAL EVERY 4 HOURS PRN
Qty: 20 TABLET | Refills: 0 | Status: ON HOLD | OUTPATIENT
Start: 2024-05-01

## 2024-05-01 NOTE — PAYOR COMM NOTE
--------------  ADMISSION REVIEW   4/29-4/30  Payor: [employer]  Subscriber #:  [id number]  Authorization Number: [id number]    Admit date: 4/30/24  Admit time: 12:48 AM    Admit Orders (From admission, onward)       Start     Ordered    04/30/24 0209  Admit to inpatient Once  Once        Ordering Provider: Chi Grossman MD   Question:  Diagnosis  Answer:  Urinary retention    04/30/24 0208    04/29/24 2154  Place in observation Once  (Place Observation TELE Medicine)  Once,   Status:  Canceled        Ordering Provider: Chi Grossman MD   Question:  Diagnosis  Answer:  Urinary retention    04/29/24 2153           REVIEW DOCUMENTATION:  ED Provider Notes signed by Chi Grossman MD at 4/29/2024 10:15 PM    Patient Seen in: [facility] Emergency Department  History     Chief Complaint   Patient presents with    Urinary Retention     Stated Complaint: here yesterday and had jenkins placed, had them remove d/t discomfort, sent by PM*    Subjective:   HPI  Patient is a 64-year-old male who states for the past week he has had some dysuria urgency and frequency.  Patient states he was diagnosed with a UTI yesterday.  Patient states he did have a dose of Keflex.  Patient states he had a catheter placed this morning and states he could not tolerate it and came back and had it removed this morning.  Patient went to his primary doctor complaining of suprapubic pain unable to void and abdominal pain was sent back to the emerged part for further evaluation.  Patient denies fevers or chills, no vomiting, no diarrhea.  Patient denies frequent UTIs states he may have had 1 as a child.  Patient was also seen a week ago for right-sided back pain had x-rays at that time.  Remainder of review of systems negative.    Objective:   Past Medical History:    Anxiety    Depression    Enlarged prostate    NIDIA (obstructive sleep apnea)    Other and unspecified hyperlipidemia    Unspecified essential hypertension    Urinary retention    Positive for stated complaint: here yesterday and had jenkins placed, had them remove d/t discomfort, sent by PM*  Other systems are as noted in HPI.  Constitutional and vital signs reviewed.      All other systems reviewed and negative except as noted above.    Physical Exam     ED Triage Vitals [04/29/24 1652]   /81   Pulse 102   Resp 20   Temp 97.7 °F (36.5 °C)   Temp src Oral   SpO2 94 %   O2 Device None (Room air)       Current:/87   Pulse 87   Temp 97.7 °F (36.5 °C) (Oral)   Resp 18   Ht 177.8 cm (5' 10\")   Wt 68 kg   SpO2 97%   BMI 21.52 kg/m²     Physical Exam  GENERAL: Patient resting on the cart in no acute distress.  HEENT: Extraocular muscles intact, pupils equal round reactive to light, neck supple, no meningismus.  LUNGS: Lungs clear to auscultation bilaterally.  CARDIOVASCULAR: + S1-S2, regular rate and rhythm, no murmurs.  BACK: No CVA tenderness, no midline bony tenderness.  ABDOMEN: + Bowel sounds, soft, moderate tenderness suprapubic, nondistended.  No rebound, no guarding, no hepatosplenomegaly.  EXTREMITIES: Full range of motion, no tenderness, good capillary refill.  SKIN: No rash, good turgor.  NEURO: Patient answers questions appropriately.  No focal deficits appreciated.     Labs Reviewed   COMP METABOLIC PANEL (14) - Abnormal; Notable for the following components:       Result Value    Glucose 105 (*)     BUN 31 (*)     AST 44 (*)     All other components within normal limits   URINALYSIS WITH CULTURE REFLEX - Abnormal; Notable for the following components:    Urine Color Red (*)     Clarity Urine Cloudy (*)     Bilirubin Urine Small (*)     Ketones Urine 15 (*)     Blood Urine Large (*)     Protein Urine >=300 (*)     Nitrite Urine Positive (*)     WBC Urine 6-10 (*)     RBC Urine >10 (*)     All other components within normal limits   UA MICROSCOPIC ONLY, URINE - Abnormal; Notable for the following components:    WBC Urine 6-10 (*)     RBC Urine >10 (*)     All other  components within normal limits   CBC W/ DIFFERENTIAL - Abnormal; Notable for the following components:    WBC 12.7 (*)     Neutrophil Absolute Prelim 10.10 (*)     Neutrophil Absolute 10.10 (*)     Monocyte Absolute 1.01 (*)     All other components within normal limits   LACTIC ACID, PLASMA - Normal   CBC WITH DIFFERENTIAL WITH PLATELET    Narrative:     The following orders were created for panel order CBC With Differential With Platelet.  Procedure                               Abnormality         Status                     ---------                               -----------         ------                     CBC W/ DIFFERENTIAL[[id number]]          Abnormal            Final result                 Please view results for these tests on the individual orders.   URINE CULTURE, ROUTINE     MDM      Patient did have a bladder scan with 775 mL of urine.  Patient request something for pain.  Patient given Toradol.  Patient with back pain, dysuria will have CT to rule out kidney stone.  Patient CT did show stones in the kidney but not into the ureter.  Patient was given a dose of IV Rocephin for likely UTI.  Patient is a note from his primary physician wanting him admitted to see urology.  Patient does not feel comfortable going home.  Patient has had 3 prior visits to the emerged part in the past 24 hours and this is his fourth visit.  Patient will be admitted for further evaluation.  Patient does have significant hematuria on his Leon catheter.  Patient was discussed with Gildardo mirandaist.  I did consider kidney stone, UTI, hematuria, urinary retention.    Admission disposition: 4/29/2024  9:43 PM    Disposition and Plan     Clinical Impression:  1. Urinary retention    2. Urinary tract infection with hematuria, site unspecified    3. Abdominal pain, acute    4. Back pain without radiation       Disposition:  Admit  4/29/2024  9:43 pm  Hospital Problems       Present on Admission  Date Reviewed: 4/25/2024             ICD-10-CM Noted POA    * (Principal) Urinary retention R33.9 4/29/2024 Unknown    Acute cystitis with hematuria N30.01 4/29/2024 Unknown    Azotemia R79.89 4/29/2024 Yes    Hematuria R31.9 4/29/2024 Unknown    Hyperglycemia R73.9 4/29/2024 Yes               4/29 H&P  Chief Complaint: Urinary retention        Subjective:  History of Present Illness:      Tong León is a 64 year old male with a PMH of anxiety, depression, BPH, and carotid stenosis who presented to ED multiple times today due to urinary issues. Patient initially presented early this morning due to urinary frequency. Patient was found to be retaining urine and jenkins catheter was placed. Patient was discharged home. Patient became anxious about jenkins and came back to ED to have jenkins removed which was done. Patient went to PCP this afternoon due to inability to void and suprapubic pain. Patient was sent back to ED. Of note, patient was recently diagnosed with UTI OP and was taking keflex though had a reaction to antibiotic and stopped taking it.     Assessment & Plan:  #Urinary retention  -Jenkins placed in ED  -Urology eval     #Hematuria  -Suspect due to jenkins trauma  -Monitor     #UTI  -Rocephin pending culture     #BPH  -Flomax     #Essential HTN  -Continue home meds     #Carotid stenosis s/p TCAR 03/23  -Hold plavix for now given hematuria   -Statin            4/30 Urology  Reason for Consultation:  GROSS HEMATURIA  URINARY RETENTION     History of Present Illness:  Tong León is a a(n) 64 year old male PMH of anxiety, depression, BPH, and carotid stenosis      Recently diagnosed with UTI - placed on Bactrim >  switched to Cipro by PCP     ER on 4/28/24 for UTI     UA 4/28/24: 1-5 WBC/hpf, 3-5 RBC/hpf, no bacteria, few squamous epithelial cells  Urine culture 4/28/24: no growth  DC with keflex     Presented to ER yesterday with urinary retention.  Jenkins catheter placed.       Returned to ER - wanted jenkins catheter out.  Jenkins catheter  removed.     Returned to ER due to urinary retention.  Bladder scan 775 mL.  Jenkins catheter placed.  Hematuria noted.     Labs:  WBC 12.7 > 9.1  Hgb 16.7 > 15.2  Platelet count 192 > 188  Serum creat 0.92 > 1     UA 4/29/24: 6-10 WBC/hpf, >10 RBC/hpf, no bacteria  Urine culture 4/29/24: pending     Abdominal/pelvic CT scan without contrast 4/29/24:  CONCLUSION:    1. Two nonobstructing left renal calculi.   2. Uncomplicated colonic diverticulosis.      Urology was consulted.     Reports history of retention s/p surgery - required straight cath x 1 then was able to void.  No prostate surgery  No UTI  No saddle anesthesia  Has neuropathy     Increased urinary frequency, stranguria over the past week or so. No dysuria or hematuria.  Recently seen by PCP - PSA ok per patient.  Prostate US ordered by PCP.       Baseline urination:  Urinary frequency every 1-2 hours  No urinary urgency  Nocturia x 1  No nocturnal enuresis  No stranguria  No dysuria or hematuria     On plavix  Former smoker  No UTI  No TB  Brother with prostate cancer     URINARY RETENTION  GROSS HEMATURIA IN THE SETTING OF JENKINS CATHETER/PLAVIX  WBC 12.7 > 9.1  Hgb 16.7 > 15.2  Platelet count 192 > 188  Serum creat 0.92 > 1  UA 4/29/24: 6-10 WBC/hpf, >10 RBC/hpf, no bacteria  Urine culture 4/29/24: pending  CT A/P without contrast 4/29/24: left urolithiasis, no hydronephrosis.       Recommendations:  Plavix on hold  Jenkins catheter hand irrigated easily.  No clots obtained.  Monitor color/clarity of urine.  Discussed possible need for CBI if worsening hematuria/clots.    CPM with tamsulosin  Minimize meds with anticholinergic effects  Obtain urine culture results from PCP if available.    Check final culture results  Follow labs  Voiding trial end of this week/early next week  If patient fails voiding trial, recommend he learn CIC  Office cystoscopy          4/30  Chief Complaint: UR        Subjective:  Patient jenkins in place, clear            Objective:  Review of Systems:   A comprehensive review of systems was completed; pertinent positive and negatives stated in subjective.     Vital signs:  Temp:  [97.6 °F (36.4 °C)-98.1 °F (36.7 °C)] 97.6 °F (36.4 °C)  Pulse:  [] 73  Resp:  [15-30] 19  BP: (124-159)/(78-91) 126/83  SpO2:  [93 %-97 %] 93 %     Physical Exam:    General: No acute distress  Respiratory: No wheezes, no rhonchi  Cardiovascular: S1, S2, regular rate and rhythm  Abdomen: Soft, Non-tender, non-distended, positive bowel sounds  Neuro: No new focal deficits.   Extremities: No edema        Diagnostic Data:    Labs:        Recent Labs   Lab 04/28/24 1336 04/29/24 1842 04/30/24  0616   WBC 7.3 12.7* 9.1   HGB 16.4 16.7 15.2   MCV 94.2 93.4 94.2   .0 192.0 188.0               Recent Labs   Lab 04/28/24 1336 04/29/24 1842 04/30/24  0616   * 105* 105*   BUN 30* 31* 25*   CREATSERUM 0.78 0.92 1.00   CA 9.4 9.3 8.9   ALB 4.4 4.0  --     136 139   K 4.0 4.6 4.4    105 107   CO2 24.0 28.0 29.0   ALKPHO 72 70  --    AST 26 44*  --    ALT 34 54  --    BILT 0.4 0.8  --    TP 7.5 7.4  --          Estimated Creatinine Clearance: 77.1 mL/min (based on SCr of 1 mg/dL).          Recent Labs   Lab 04/28/24 1336 04/28/24  1518   TROPHS 6 7         Medications:   Scheduled Medications    losartan  50 mg Oral Daily    nortriptyline  25 mg Oral Daily    pravastatin  20 mg Oral Daily    tamsulosin  0.4 mg Oral Daily @ 0700    sertraline  50 mg Oral Daily    cefTRIAXone  1 g Intravenous Q24H    clonazePAM  2 mg Oral Nightly                  Assessment & Plan:  #Urinary retention  -Jenkins - clear  -Urology eval  -CBI clamped  -tamsulosin  -likely dc w/ jenkins     #Hematuria  -Suspect due to jenkins trauma  -Monitor     #UTI  -Rocephin pending culture     #BPH  -Flomax     #anxity  -klonopin prn  -trial hydroxyzine prn     #Essential HTN  -Continue home meds     #Carotid stenosis s/p TCAR 03/23  -Hold plavix for now given hematuria    -Statin             MEDICATIONS ADMINISTERED IN LAST 1 DAY:  cefTRIAXone (Rocephin) 1 g in D5W 100 mL IVPB-ADD       Date Action Dose Route User    4/30/2024 1744 New Bag 1 g Intravenous Farida Khan, ANGELA          clonazePAM (KlonoPIN) tab 2 mg       Date Action Dose Route User    4/30/2024 2343 Given 2 mg Oral David, Claudia          HYDROcodone-acetaminophen (Norco) 5-325 MG per tab 1 tablet       Date Action Dose Route User    5/1/2024 0826 Given 1 tablet Oral Farzana Carrillo RN    4/30/2024 2048 Given 1 tablet Oral Macie Zaragoza RN          hydrOXYzine (Atarax) tab 25 mg       Date Action Dose Route User    4/30/2024 1744 Given 25 mg Oral Farida Khan RN          losartan (Cozaar) tab 50 mg       Date Action Dose Route User    5/1/2024 0821 Given 50 mg Oral Farzana Carrillo RN          nortriptyline (Pamelor) cap 25 mg       Date Action Dose Route User    5/1/2024 0821 Given 25 mg Oral Farzana Carrillo RN          pravastatin (Pravachol) tab 20 mg       Date Action Dose Route User    5/1/2024 0821 Given 20 mg Oral Farzana Carrillo RN          sertraline (Zoloft) tab 50 mg       Date Action Dose Route User    5/1/2024 0821 Given 50 mg Oral Farzana Carrillo RN          sodium chloride 0.9% infusion       Date Action Dose Route User    5/1/2024 0150 New Bag (none) Intravenous Aye Sam RN    4/30/2024 1723 New Bag (none) Intravenous Farida Khan, ANGELA          tamsulosin (Flomax) cap 0.4 mg       Date Action Dose Route User    5/1/2024 0552 Given 0.4 mg Oral Hernandez, Claudia            Vitals (last day)       Date/Time Temp Pulse Resp BP SpO2 Weight O2 Device O2 Flow Rate (L/min) Who    05/01/24 0736 97.5 °F (36.4 °C) 80 22 138/88 92 % -- None (Room air) -- OR    05/01/24 0553 97.7 °F (36.5 °C) 73 24 126/92 97 % -- None (Room air) -- SB    04/30/24 2325 97.6 °F (36.4 °C) 86 21 147/87 91 % -- None (Room air) -- SB    04/30/24 1903 97.9 °F (36.6 °C) 98 17 140/85 94 % -- None (Room air) -- SB    04/30/24  1613 98.1 °F (36.7 °C) 81 22 152/97 91 % -- None (Room air) -- OR    04/30/24 1131 97.6 °F (36.4 °C) 73 19 126/83 93 % -- None (Room air) -- OR    04/30/24 0742 97.6 °F (36.4 °C) 74 30 124/89 93 % -- None (Room air) -- OR    04/30/24 0509 98.1 °F (36.7 °C) 83 15 128/78 93 % -- None (Room air) -- SB    04/30/24 0056 97.7 °F (36.5 °C) 82 18 144/87 93 % 250 lb 8 oz None (Room air) -- SB    04/30/24 0002 -- 75 16 159/91 95 % -- None (Room air) -- EM

## 2024-05-01 NOTE — DISCHARGE SUMMARY
[facility]     Tong León Patient Status:  Inpatient    1960 MRN [id number]   Location [facility] 3NE-A Attending Robb Patel MD   Hosp Day # 1 PCP Mesfin Millan MD     Date of Admission: 2024  Date of Discharge:   24    Discharge Disposition: Home or Self Care    Discharge Diagnosis:  #Urinary retention  -Jenkins - clear  -Urology eval  -CBI clamped  -tamsulosin  -dc w/ jenkins. D/w urology. [place] for pain      #Hematuria  -Suspect due to jenkins trauma  -Monitor     #UTI- ruled out  -dc abx. Ucx neg     #BPH  -Flomax     #anxity  -klonopin prn  -trial hydroxyzine prn     #Essential HTN  -Continue home meds     #Carotid stenosis s/p TCAR   -Hold plavix for now given hematuria   -Statin     History of Present Illness: Tong León is a 64 year old male with a PMH of anxiety, depression, BPH, and carotid stenosis who presented to ED multiple times today due to urinary issues. Patient initially presented early this morning due to urinary frequency. Patient was found to be retaining urine and jenkins catheter was placed. Patient was discharged home. Patient became anxious about jenkins and came back to ED to have jenkins removed which was done. Patient went to PCP this afternoon due to inability to void and suprapubic pain. Patient was sent back to ED. Of note, patient was recently diagnosed with UTI OP and was taking keflex though had a reaction to antibiotic and stopped taking it.        Brief Synopsis: pt w/ urinary retention. Jenkins placed. Mild hematuria, likely 2/2 trauma. Resolved. UTI ruled out with neg Ucx. Can resume plavix. Ok to DC home w/ jenkins. VT in 1 week.    Lace+ Score: 43  59-90 High Risk  29-58 Medium Risk  0-28   Low Risk       TCM Follow-Up Recommendation:  LACE 29-58: Moderate Risk of readmission after discharge from the hospital.  **Certification    Admission date was 2024.  Inpatient stay was shorter than expected.  Patient's Urinary  retention was initially serious enough to expect a more lengthy hospitalization but patient improved faster than expected.                 Procedures during hospitalization:   none    Incidental or significant findings and recommendations (brief descriptions):  none    Lab/Test results pending at Discharge:   none    Consultants:  urology    Discharge Medication List:     Discharge Medications        CHANGE how you take these medications        Instructions Prescription details   HYDROcodone-acetaminophen 5-325 MG Tabs  Commonly known as: Norco  What changed:   when to take this  reasons to take this      Take 1 tablet by mouth every 4 (four) hours as needed.   Quantity: 20 tablet  Refills: 0            CONTINUE taking these medications        Instructions Prescription details   clonazePAM 2 MG Tabs  Commonly known as: KlonoPIN      Take 1 tablet (2 mg total) by mouth at bedtime.   Refills: 0     ergocalciferol 1.25 MG (64624 UT) Caps  Commonly known as: ERGOCALCIFEROL      1 capsule (50,000 Units total). Takes once a week on Sunday   Refills: 0     folic acid 1 MG Tabs  Commonly known as: Folvite      Take 1 tablet (1 mg total) by mouth daily.   Refills: 0     losartan 50 MG Tabs  Commonly known as: Cozaar      Take 1 tablet (50 mg total) by mouth daily.   Refills: 0     Meloxicam 7.5 MG Tabs      Take 1 tablet (7.5 mg total) by mouth daily.   Quantity: 15 tablet  Refills: 0     nortriptyline 50 MG Caps  Commonly known as: Pamelor      Take 1 capsule (50 mg total) by mouth daily.   Refills: 0     phenazopyridine 200 MG Tabs  Commonly known as: Pyridum      Take 1 tablet (200 mg total) by mouth 3 (three) times daily as needed for Pain.   Stop taking on: May 6, 2024  Quantity: 6 tablet  Refills: 0     pravastatin 20 MG Tabs  Commonly known as: Pravachol      Take 1 tablet (20 mg total) by mouth daily.   Refills: 0     tamsulosin 0.4 MG Caps  Commonly known as: Flomax      Take 1 capsule (0.4 mg total) by mouth  daily.   Refills: 0     thiamine 100 MG Tabs      Take 1 tablet (100 mg total) by mouth daily.   Refills: 0     Vraylar 1.5 MG Caps  Generic drug: Cariprazine HCl      1 capsule.   Refills: 0     Zoloft 50 MG Tabs  Generic drug: sertraline      Take 1 tablet (50 mg total) by mouth daily.   Refills: 0            STOP taking these medications      cephalexin 500 MG Caps  Commonly known as: Keflex                  Where to Get Your Medications        These medications were sent to [id number] - [place], [place] - [address] [phone], [phone]  [address], [facility] [address]      Phone: [phone]   HYDROcodone-acetaminophen 5-325 MG Tabs         [place]    Follow-up appointment:   Davion Alfred MD  [address]  [address]  [facility] [address] [phone]    Follow up  jenkins catheter removal with urology nurse at the end of this week/early next week followed by office cystoscopy [facility] urologist.    Mesfin Millan MD  [address] [facility] [id number] [phone]    Schedule an appointment as soon as possible for a visit      Appointments for Next 30 Days 2024 - 2024      None            Vital signs:  Temp:  [97.5 °F (36.4 °C)-98.1 °F (36.7 °C)] 97.5 °F (36.4 °C)  Pulse:  [73-98] 89  Resp:  [13-24] 13  BP: (126-152)/(85-97) 128/89  SpO2:  [91 %-97 %] 93 %    Physical Exam:    General: No acute distress   Lungs: clear to auscultation  Cardiovascular: S1, S2  Abdomen: Soft      -----------------------------------------------------------------------------------------------  PATIENT DISCHARGE INSTRUCTIONS: See electronic chart    Robb Patel MD    Total time spent on discharge plannin minutes     The  [employer] C[employer] Act makes medical notes like these available to patients in the interest of transparency. Please be advised this is a medical document. Medical documents are intended to carry relevant information, facts as evident, and the clinical opinion of  the practitioner. The medical note is intended as peer to peer communication and may appear blunt or direct. It is written in medical language and may contain abbreviations or verbiage that are unfamiliar.

## 2024-05-01 NOTE — PLAN OF CARE
Assumed care @ 1900  A&Ox4  NSR, non cardiac protocol   Leon catheter in place and secured  Cardiac/reg diet  Norco given for pain   Ambulates with standby assist   PIV: right Antecubital 0.9  ml/hr   Bed in lowest position and call light in reach  Continuing to meet patient's needs   Need further details reference flowsheets     0228: patient Svo2 de-sat put on    L of O2  Problem: Patient/Family Goals  Goal: Patient/Family Long Term Goal  Description: Patient's Long Term Goal: discharge    Interventions:  - urology c/s  - iv abx  - See additional Care Plan goals for specific interventions  Outcome: Progressing  Goal: Patient/Family Short Term Goal  Description: Patient's Short Term Goal: pain control     Interventions:   - prn pain medication  - See additional Care Plan goals for specific interventions  Outcome: Progressing

## 2024-05-01 NOTE — PROGRESS NOTES
Assumed care at 7:30 am  Patient is alert and oriented x4  Leon in place, draining well  Able to ambulate independently  Possible discharge today, need to wait for urology to clear  All safety precautions in place. Will continue to monitor patient.     Discharge order given. Cleared to go by Urology per Hopsitalist. - Patient given leg bag and extra drainage bag.   Aware to make an appointment with Urology asap.   Tele removed. Iv removed.   Ambulated to ER, stated he will drive himself.

## 2024-05-01 NOTE — PROGRESS NOTES
[facility]  Urology Progress Note    Tong León Patient Status:  Inpatient    1960 MRN [id number]   Location [facility] 3NE-A Attending Robb Patel MD   Hosp Day # 1 PCP Mesfin Millan MD     Subjective:  Tong León is a(n) 64 year old male.    Current complaints: Denies abdominal/flank pain.     Objective:  General appearance: alert, appears stated age, and cooperative  Blood pressure 138/88, pulse 80, temperature 97.5 °F (36.4 °C), temperature source Oral, resp. rate 22, height 5' 10\" (1.778 m), weight 250 lb 8 oz (113.6 kg), SpO2 92%.  Lungs: non-labored respirations  Abdomen: soft, non-tender  : polk catheter in place draining yellow urine     Lab Results   Component Value Date    WBC 7.3 2024    HGB 16.2 2024    HCT 48.5 2024    .0 2024    CREATSERUM 0.80 2024    BUN 13 2024     2024    K 4.1 2024     2024    CO2 27.0 2024    GLU 98 2024    CA 9.1 2024       Hospital Encounter on 24   1. Urine Culture, Routine     Status: None    Collection Time: 24  6:42 PM    Specimen: Urine, clean catch   Result Value Ref Range    Urine Culture No Growth at 18-24 hrs. N/A        CT ABDOMEN+PELVIS KIDNEYSTONE 2D RNDR(NO IV,NO ORAL)(CPT=74176)    Result Date: 2024  CONCLUSION:  1. Two nonobstructing left renal calculi. 2. Uncomplicated colonic diverticulosis. 3. Details as above.  Continued clinical correlation recommended.     LOCATION:  [place]   Dictated by (CST): Ronaldo Thorpe MD on 2024 at 8:26 PM     Finalized by (CST): Ronaldo Thorpe MD on 2024 at 8:31 PM         Assessment:    URINARY RETENTION  GROSS HEMATURIA IN THE SETTING OF POLK CATHETER/PLAVIX  WBC 12.7 > 9.1 > 7.3  Hgb 16.7 > 15.2 > 16.2  Platelet count 192 > 188 > 177  Serum creat 0.92 > 1 > 0.8  Urine culture 24: no growth  UA 24: 6-10 WBC/hpf, >10 RBC/hpf, no bacteria  Urine culture 24: no growth  CT A/P  without contrast 4/29/24: left urolithiasis, no hydronephrosis.    Plan:    Plavix on hold  CPM with tamsulosin  Minimize meds with anticholinergic effects  Obtain urine culture results from PCP if available.    Follow labs  Voiding trial end of this week/early next week  If patient fails voiding trial, recommend he learn CIC  Office cystoscopy    TE sent to help arrange appts.    Above discussed with patient    LAURIE Arteaga [facility]  Department of Urology  5/1/2024  8:49 AM

## 2024-05-02 NOTE — PAYOR COMM NOTE
--------------  DISCHARGE REVIEW    Payor: [employer]  Subscriber #:  [id number]  Authorization Number: [id number]    Admit date: 24  Admit time:  12:48 AM  Discharge Date: 2024  4:36 PM     Admitting Physician: Padmini Crawford MD  Attending Physician:  No att. providers found  Primary Care Physician: Mesfin Millan MD          Discharge Summary Notes        Discharge Summary signed by Robb Patel MD at 2024  2:49 PM       Author: Robb Patel MD Specialty: HOSPITALIST, Internal Medicine Author Type: Physician    Filed: 2024  2:49 PM Date of Service: 2024  2:45 PM Status: Signed    : Robb Patel MD (Physician)           [facility]     Tong León Patient Status:  Inpatient    1960 MRN [id number]   Location [facility] 3NE-A Attending Robb Patel MD   Hosp Day # 1 PCP Mesfin Millan MD     Date of Admission: 2024  Date of Discharge:   24    Discharge Disposition: Home or Self Care    Discharge Diagnosis:  #Urinary retention  -Jenkins - clear  -Urology eval  -CBI clamped  -tamsulosin  -dc w/ jenkins. D/w urology. [place] for pain      #Hematuria  -Suspect due to jenkins trauma  -Monitor     #UTI- ruled out  -dc abx. Ucx neg     #BPH  -Flomax     #anxity  -klonopin prn  -trial hydroxyzine prn     #Essential HTN  -Continue home meds     #Carotid stenosis s/p TCAR   -Hold plavix for now given hematuria   -Statin     History of Present Illness: Tong León is a 64 year old male with a PMH of anxiety, depression, BPH, and carotid stenosis who presented to ED multiple times today due to urinary issues. Patient initially presented early this morning due to urinary frequency. Patient was found to be retaining urine and jenkins catheter was placed. Patient was discharged home. Patient became anxious about jenkins and came back to ED to have jenkins removed which was done. Patient went to PCP this afternoon due to inability to void and  suprapubic pain. Patient was sent back to ED. Of note, patient was recently diagnosed with UTI OP and was taking keflex though had a reaction to antibiotic and stopped taking it.        Brief Synopsis: pt w/ urinary retention. Jenkins placed. Mild hematuria, likely 2/2 trauma. Resolved. UTI ruled out with neg Ucx. Can resume plavix. Ok to DC home w/ jenkins. [place] in 1 week.    Lace+ Score: 43  59-90 High Risk  29-58 Medium Risk  0-28   Low Risk       TCM Follow-Up Recommendation:  LACE 29-58: Moderate Risk of readmission after discharge from the hospital.  **Certification    Admission date was 4/29/2024.  Inpatient stay was shorter than expected.  Patient's Urinary retention was initially serious enough to expect a more lengthy hospitalization but patient improved faster than expected.                 Procedures during hospitalization:   none    Incidental or significant findings and recommendations (brief descriptions):  none    Lab/Test results pending at Discharge:   none    Consultants:  urology    Discharge Medication List:     Discharge Medications        CHANGE how you take these medications        Instructions Prescription details   HYDROcodone-acetaminophen 5-325 MG Tabs  Commonly known as: Norco  What changed:   when to take this  reasons to take this      Take 1 tablet by mouth every 4 (four) hours as needed.   Quantity: 20 tablet  Refills: 0            CONTINUE taking these medications        Instructions Prescription details   clonazePAM 2 MG Tabs  Commonly known as: KlonoPIN      Take 1 tablet (2 mg total) by mouth at bedtime.   Refills: 0     ergocalciferol 1.25 MG (05047 UT) Caps  Commonly known as: ERGOCALCIFEROL      1 capsule (50,000 Units total). Takes once a week on Sunday   Refills: 0     folic acid 1 MG Tabs  Commonly known as: Folvite      Take 1 tablet (1 mg total) by mouth daily.   Refills: 0     losartan 50 MG Tabs  Commonly known as: Cozaar      Take 1 tablet (50 mg total) by mouth daily.    Refills: 0     Meloxicam 7.5 MG Tabs      Take 1 tablet (7.5 mg total) by mouth daily.   Quantity: 15 tablet  Refills: 0     nortriptyline 50 MG Caps  Commonly known as: Pamelor      Take 1 capsule (50 mg total) by mouth daily.   Refills: 0     phenazopyridine 200 MG Tabs  Commonly known as: Pyridum      Take 1 tablet (200 mg total) by mouth 3 (three) times daily as needed for Pain.   Stop taking on: May 6, 2024  Quantity: 6 tablet  Refills: 0     pravastatin 20 MG Tabs  Commonly known as: Pravachol      Take 1 tablet (20 mg total) by mouth daily.   Refills: 0     tamsulosin 0.4 MG Caps  Commonly known as: Flomax      Take 1 capsule (0.4 mg total) by mouth daily.   Refills: 0     thiamine 100 MG Tabs      Take 1 tablet (100 mg total) by mouth daily.   Refills: 0     Vraylar 1.5 MG Caps  Generic drug: Cariprazine HCl      1 capsule.   Refills: 0     Zoloft 50 MG Tabs  Generic drug: sertraline      Take 1 tablet (50 mg total) by mouth daily.   Refills: 0            STOP taking these medications      cephalexin 500 MG Caps  Commonly known as: Keflex                  Where to Get Your Medications        These medications were sent to [id number] - [place], [place] - [address] [phone], [phone]  [address] [facility] [address]      Phone: [phone]   HYDROcodone-acetaminophen 5-325 MG Tabs         [place]    Follow-up appointment:   Davion Alfred MD  [address]  [address]  [facility] [address] [phone]    Follow up  jenkins catheter removal with urology nurse at the end of this week/early next week followed by office cystoscopy [facility] urologist.    Mesfin Millan MD  [address] [facility] [address] [phone]    Schedule an appointment as soon as possible for a visit      Appointments for Next 30 Days 5/1/2024 - 5/31/2024      None            Vital signs:  Temp:  [97.5 °F (36.4 °C)-98.1 °F (36.7 °C)] 97.5 °F (36.4 °C)  Pulse:  [73-98] 89  Resp:  [13-24] 13  BP:  (126-152)/(85-97) 128/89  SpO2:  [91 %-97 %] 93 %    Physical Exam:    General: No acute distress   Lungs: clear to auscultation  Cardiovascular: S1, S2  Abdomen: Soft      -----------------------------------------------------------------------------------------------  PATIENT DISCHARGE INSTRUCTIONS: See electronic chart    Robb Patel MD    Total time spent on discharge plannin minutes     The  Cent[employer] Cures Act makes medical notes like these available to patients in the interest of transparency. Please be advised this is a medical document. Medical documents are intended to carry relevant information, facts as evident, and the clinical opinion of the practitioner. The medical note is intended as peer to peer communication and may appear blunt or direct. It is written in medical language and may contain abbreviations or verbiage that are unfamiliar.     Electronically signed by Robb Patel MD on 2024  2:49 PM

## 2024-05-03 ENCOUNTER — APPOINTMENT (OUTPATIENT)
Dept: GENERAL RADIOLOGY | Facility: HOSPITAL | Age: 64
End: 2024-05-03
Attending: EMERGENCY MEDICINE
Payer: MEDICARE

## 2024-05-03 ENCOUNTER — HOSPITAL ENCOUNTER (OUTPATIENT)
Facility: HOSPITAL | Age: 64
Setting detail: OBSERVATION
Discharge: HOME | End: 2024-05-07
Attending: EMERGENCY MEDICINE | Admitting: HOSPITALIST
Payer: MEDICARE

## 2024-05-03 DIAGNOSIS — R41.82 ALTERED MENTAL STATUS, UNSPECIFIED ALTERED MENTAL STATUS TYPE: ICD-10-CM

## 2024-05-03 DIAGNOSIS — T50.901A POLYSUBSTANCE OVERDOSE, ACCIDENTAL OR UNINTENTIONAL, INITIAL ENCOUNTER: Primary | ICD-10-CM

## 2024-05-03 PROBLEM — N40.0 BENIGN PROSTATIC HYPERPLASIA: Status: ACTIVE | Noted: 2024-05-03

## 2024-05-03 PROBLEM — F41.9 ANXIETY: Status: ACTIVE | Noted: 2024-05-03

## 2024-05-03 PROBLEM — F32.A DEPRESSION: Status: ACTIVE | Noted: 2024-05-03

## 2024-05-03 LAB
ALBUMIN SERPL-MCNC: 3.7 G/DL (ref 3.4–5)
ALBUMIN/GLOB SERPL: 1.3 {RATIO} (ref 1–2)
ALP LIVER SERPL-CCNC: 62 U/L
ALT SERPL-CCNC: 42 U/L
AMPHET UR QL SCN: NEGATIVE
ANION GAP SERPL CALC-SCNC: 9 MMOL/L (ref 0–18)
APAP SERPL-MCNC: <2 UG/ML (ref 10–30)
AST SERPL-CCNC: 19 U/L (ref 15–37)
ATRIAL RATE: 72 BPM
BASOPHILS # BLD AUTO: 0.02 X10(3) UL (ref 0–0.2)
BASOPHILS NFR BLD AUTO: 0.3 %
BILIRUB SERPL-MCNC: 0.6 MG/DL (ref 0.1–2)
BILIRUB UR QL STRIP.AUTO: NEGATIVE
BUN BLD-MCNC: 23 MG/DL (ref 9–23)
CALCIUM BLD-MCNC: 8.8 MG/DL (ref 8.5–10.1)
CANNABINOIDS UR QL SCN: NEGATIVE
CHLORIDE SERPL-SCNC: 105 MMOL/L (ref 98–112)
CLARITY UR REFRACT.AUTO: CLEAR
CO2 SERPL-SCNC: 25 MMOL/L (ref 21–32)
COCAINE UR QL: NEGATIVE
CREAT BLD-MCNC: 0.8 MG/DL
CREAT UR-SCNC: 190 MG/DL
EGFRCR SERPLBLD CKD-EPI 2021: 99 ML/MIN/1.73M2 (ref 60–?)
EOSINOPHIL # BLD AUTO: 0.04 X10(3) UL (ref 0–0.7)
EOSINOPHIL NFR BLD AUTO: 0.5 %
ERYTHROCYTE [DISTWIDTH] IN BLOOD BY AUTOMATED COUNT: 14.3 %
ETHANOL SERPL-MCNC: 31 MG/DL (ref ?–3)
GLOBULIN PLAS-MCNC: 2.8 G/DL (ref 2.8–4.4)
GLUCOSE BLD-MCNC: 88 MG/DL (ref 70–99)
GLUCOSE BLD-MCNC: 94 MG/DL (ref 70–99)
GLUCOSE UR STRIP.AUTO-MCNC: NORMAL MG/DL
HCT VFR BLD AUTO: 48.3 %
HGB BLD-MCNC: 15.9 G/DL
IMM GRANULOCYTES # BLD AUTO: 0.01 X10(3) UL (ref 0–1)
IMM GRANULOCYTES NFR BLD: 0.1 %
KETONES UR STRIP.AUTO-MCNC: NEGATIVE MG/DL
LEUKOCYTE ESTERASE UR QL STRIP.AUTO: NEGATIVE
LYMPHOCYTES # BLD AUTO: 1.51 X10(3) UL (ref 1–4)
LYMPHOCYTES NFR BLD AUTO: 19.6 %
MCH RBC QN AUTO: 30.6 PG (ref 26–34)
MCHC RBC AUTO-ENTMCNC: 32.9 G/DL (ref 31–37)
MCV RBC AUTO: 93.1 FL
MDMA UR QL SCN: NEGATIVE
MONOCYTES # BLD AUTO: 0.53 X10(3) UL (ref 0.1–1)
MONOCYTES NFR BLD AUTO: 6.9 %
NEUTROPHILS # BLD AUTO: 5.61 X10 (3) UL (ref 1.5–7.7)
NEUTROPHILS # BLD AUTO: 5.61 X10(3) UL (ref 1.5–7.7)
NEUTROPHILS NFR BLD AUTO: 72.6 %
NITRITE UR QL STRIP.AUTO: NEGATIVE
OSMOLALITY SERPL CALC.SUM OF ELEC: 291 MOSM/KG (ref 275–295)
OXYCODONE UR QL SCN: NEGATIVE
P AXIS: 36 DEGREES
P-R INTERVAL: 152 MS
PH UR STRIP.AUTO: 6 [PH] (ref 5–8)
PLATELET # BLD AUTO: 186 10(3)UL (ref 150–450)
POTASSIUM SERPL-SCNC: 4 MMOL/L (ref 3.5–5.1)
PROT SERPL-MCNC: 6.5 G/DL (ref 6.4–8.2)
Q-T INTERVAL: 422 MS
QRS DURATION: 94 MS
QTC CALCULATION (BEZET): 462 MS
R AXIS: 4 DEGREES
RBC # BLD AUTO: 5.19 X10(6)UL
RBC #/AREA URNS AUTO: >10 /HPF
SALICYLATES SERPL-MCNC: <1.7 MG/DL (ref 2.8–20)
SODIUM SERPL-SCNC: 139 MMOL/L (ref 136–145)
SP GR UR STRIP.AUTO: 1.03 (ref 1–1.03)
T AXIS: 3 DEGREES
UROBILINOGEN UR STRIP.AUTO-MCNC: NORMAL MG/DL
VENTRICULAR RATE: 72 BPM
WBC # BLD AUTO: 7.7 X10(3) UL (ref 4–11)

## 2024-05-03 PROCEDURE — 71045 X-RAY EXAM CHEST 1 VIEW: CPT | Performed by: EMERGENCY MEDICINE

## 2024-05-03 PROCEDURE — 99223 1ST HOSP IP/OBS HIGH 75: CPT | Performed by: HOSPITALIST

## 2024-05-03 NOTE — H&P
[facility]     Tong León Patient Status:  Emergency    1960 MRN [id number]   Location [facility] EMERGENCY DEPARTMENT Attending Michel Thompson MD   Hosp Day # 0 PCP Mesfin Millan MD     Chief Complaint: Weakness/lethargy    Subjective:    History of Present Illness:     Tong León is a 64 year old male with a PMH of anxiety, depression, BPH, NIDIA, and HTN who presented to ED due to weakness/light-headedness/lethargy that developed over the past day. Patient was attempting to wean of norco with PCP and was started on lofexidine. Patient was feeling withdrawal symptoms with 1 tablet, called PCP, and took 3-4 more. Afterwards, patient began to feel unwell with symptoms noted above. Patient recently admitted for urinary retention. Currently denies N/V/D/CP/SOB.     History/Other:    Past Medical History:  Past Medical History:    Anxiety    Back problem    Calculus of kidney    Depression    Enlarged prostate    High blood pressure    High cholesterol    NIDIA (obstructive sleep apnea)    Other and unspecified hyperlipidemia    Sleep apnea    Unspecified essential hypertension    Urinary retention    Visual impairment     Past Surgical History:   Past Surgical History:   Procedure Laterality Date    Cath bare metal stent (bms)      Removal of lung,lobectomy      Stent placemt retro carotid Right       Family History:   Family History   Problem Relation Age of Onset    Heart Disorder Mother     Diabetes Paternal Grandmother      Social History:    reports that he has quit smoking. He has never used smokeless tobacco. He reports that he does not currently use alcohol. He reports that he does not use drugs.     Allergies: No Known Allergies    Medications:    Current Facility-Administered Medications on File Prior to Encounter   Medication Dose Route Frequency Provider Last Rate Last Admin    [] sodium chloride 0.9% infusion   Intravenous Continuous Chi Grossman MD         [] ondansetron (Zofran) 4 MG/2ML injection 4 mg  4 mg Intravenous Q4H PRN Chi Grossman MD        [COMPLETED] lidocaine (Urojet) 2 % urethral jelly 10 mL  10 mL Urethral Once Zuleyka Matute Gerri, DO   10 mL at 24 0242    [COMPLETED] cephalexin (Keflex) cap 500 mg  500 mg Oral Once Zuleyka Matute, DO   500 mg at 24 0406    [COMPLETED] ketorolac (Toradol) 15 MG/ML injection 15 mg  15 mg Intravenous Once Chi Grossman MD   15 mg at 24 181    [COMPLETED] sodium chloride 0.9 % IV bolus 500 mL  500 mL Intravenous Once Chi Grossman MD   Stopped at 24 1926    [COMPLETED] lidocaine (Urojet) 2 % urethral jelly 10 mL  10 mL Urethral Once Chi Grossman MD   10 mL at 24 181    [COMPLETED] cefTRIAXone (Rocephin) 1 g in D5W 100 mL IVPB-ADD  1 g Intravenous Once Chi Grossman MD   Stopped at 24    [COMPLETED] HYDROcodone-acetaminophen (Norco) 5-325 MG per tab 1 tablet  1 tablet Oral Once Chi Grossman MD   1 tablet at 24 2307    [COMPLETED] LORazepam (Ativan) tab 1 mg  1 mg Oral Once Boubacar Arzola, DO   1 mg at 24 1345    [COMPLETED] ketorolac (Toradol) 15 MG/ML injection 15 mg  15 mg Intravenous Once Blanca Mccarthy MD   15 mg at 24 1716    [COMPLETED] morphINE PF 4 MG/ML injection 4 mg  4 mg Intravenous Once Blanca Mccarthy MD   4 mg at 24 1716     Current Outpatient Medications on File Prior to Encounter   Medication Sig Dispense Refill    HYDROcodone-acetaminophen 5-325 MG Oral Tab Take 1 tablet by mouth every 4 (four) hours as needed. 20 tablet 0    phenazopyridine 200 MG Oral Tab Take 1 tablet (200 mg total) by mouth 3 (three) times daily as needed for Pain. 6 tablet 0    Meloxicam 7.5 MG Oral Tab Take 1 tablet (7.5 mg total) by mouth daily. 15 tablet 0    VRAYLAR 1.5 MG Oral Cap 1 capsule.      ergocalciferol 1.25 MG (12440 UT) Oral Cap 1 capsule (50,000 Units total). Takes once a week on       nortriptyline 50 MG Oral Cap Take 1 capsule (50  mg total) by mouth daily.      ZOLOFT 50 MG Oral Tab Take 1 tablet (50 mg total) by mouth daily.      tamsulosin 0.4 MG Oral Cap Take 1 capsule (0.4 mg total) by mouth daily.      losartan 50 MG Oral Tab Take 1 tablet (50 mg total) by mouth daily.      clonazePAM 2 MG Oral Tab Take 1 tablet (2 mg total) by mouth at bedtime.      pravastatin 20 MG Oral Tab Take 1 tablet (20 mg total) by mouth daily.      folic acid 1 MG Oral Tab Take 1 tablet (1 mg total) by mouth daily.      thiamine 100 MG Oral Tab Take 1 tablet (100 mg total) by mouth daily.         Review of Systems:   A comprehensive review of systems was completed.    Pertinent positives and negatives noted in the HPI.    Objective:   Physical Exam:    BP 98/59   Pulse 59   Temp 97 °F (36.1 °C) (Temporal)   Resp 25   SpO2 98%   General: No acute distress, Alert  Respiratory: No rhonchi, no wheezes  Cardiovascular: S1, S2. Regular rate and rhythm  Abdomen: Soft, Non-tender, non-distended, positive bowel sounds  Neuro: No new focal deficits  Extremities: No edema    Results:    Labs:      Labs Last 24 Hours:    Recent Labs   Lab 04/30/24  0616 05/01/24  0705 05/03/24  1515   RBC 5.03 5.21 5.19   HGB 15.2 16.2 15.9   HCT 47.4 48.5 48.3   MCV 94.2 93.1 93.1   MCH 30.2 31.1 30.6   MCHC 32.1 33.4 32.9   RDW 14.7 14.9 14.3   NEPRELIM 6.61 4.90 5.61   WBC 9.1 7.3 7.7   .0 177.0 186.0       Recent Labs   Lab 04/28/24  1336 04/29/24  1842 04/30/24  0616 05/01/24  0705 05/03/24  1515   * 105* 105* 98 94   BUN 30* 31* 25* 13 23   CREATSERUM 0.78 0.92 1.00 0.80 0.80   EGFRCR 100 93 84 99 99   CA 9.4 9.3 8.9 9.1 8.8   ALB 4.4 4.0  --   --  3.7    136 139 139 139   K 4.0 4.6 4.4 4.1 4.0    105 107 107 105   CO2 24.0 28.0 29.0 27.0 25.0   ALKPHO 72 70  --   --  62   AST 26 44*  --   --  19   ALT 34 54  --   --  42   BILT 0.4 0.8  --   --  0.6   TP 7.5 7.4  --   --  6.5       No results found for: \"PT\", \"INR\"    Recent Labs   Lab 04/28/24  4150  04/28/24  1518   TROPHS 6 7       Recent Labs   Lab 04/28/24  1336   PBNP 94       No results for input(s): \"PCT\" in the last 168 hours.    Imaging: Imaging data reviewed in Epic.    Assessment & Plan:      #Light-headedness  -Likely due to polypharmacy - lofexidine/norco/klonopin/alcohol  -Hold lofexidine as newly started and can cause orthostatic hypotension  -Check orthostatics  -IVF    #BPH with LUTS  -Following with urology OP    #Anxiety  -SSRI    #Essential HTN  -Hold ARB    #Carotid stenosis s/p TCAR 3/23  -Plavix/statin     Plan of care discussed with patient and ED physician.     Андрей Hansen,     Supplementary Documentation:     The 21st Century Cures Act makes medical notes like these available to patients in the interest of transparency. Please be advised this is a medical document. Medical documents are intended to carry relevant information, facts as evident, and the clinical opinion of the practitioner. The medical note is intended as peer to peer communication and may appear blunt or direct. It is written in medical language and may contain abbreviations or verbiage that are unfamiliar.

## 2024-05-03 NOTE — ED INITIAL ASSESSMENT (HPI)
Pt to ED from home with c/o withdrawals from hydrocodone starting Wednesday. Starting taking lofexidine, without improvement. Per medics, pt is sob and more lethargic. BG 71.

## 2024-05-03 NOTE — ED PROVIDER NOTES
Patient Seen in: [facility] Emergency Department      History     Chief Complaint   Patient presents with    Eval-D     Stated Complaint: WITHDRAWALS FROM HYDROCODONE    Subjective:   HPI    64-year-old male comes to the hospital stating he feels confusion and feeling very anxious.  The patient had been on Norco for his back pain.  Patient recently been admitted to the hospital for urinary retention for 2 days for which they change his medications including his Klonopin.  He says he is feeling more anxious so in addition to taking Klonopin last night as well as Norco in the morning he used alcohol for his back pain.  In addition he did take Norco this morning.  He was just recently started on lofexidine and he initially took 1 in the morning, said he was still feeling anxious so was told to take 4 more this morning as well.  He denies any headaches.  No chest pain.  He denies shortness of breath or abdominal pain.  No nausea or vomiting.  Denying any other complaints.    Objective:   No pertinent past medical history.            No pertinent past surgical history.              No pertinent social history.            Review of Systems    Positive for stated complaint: WITHDRAWALS FROM HYDROCODONE  Other systems are as noted in HPI.  Constitutional and vital signs reviewed.      All other systems reviewed and negative except as noted above.    Physical Exam     ED Triage Vitals   BP 05/03/24 1506 91/71   Pulse 05/03/24 1506 78   Resp 05/03/24 1506 16   Temp 05/03/24 1508 97 °F (36.1 °C)   Temp src 05/03/24 1508 Temporal   SpO2 05/03/24 1506 92 %   O2 Device 05/03/24 1506 Nasal cannula       Current:BP (!) 123/104   Pulse 83   Temp 97 °F (36.1 °C) (Temporal)   Resp 20   SpO2 94%         Physical Exam  In general the patient appears intoxicated  HEENT; NCAT, EOMI, throat clear, neck supple, no LAD, no JVD  Heart S1S2 RRR  lungs: CTAB  abd: Soft NT, ND,  NABS without rebound or guarding  Ext no C/C/E  Neuro  exam without focal deficits  ED Course     Labs Reviewed   DRUG SCREEN 8 W/OUT CONFIRMATION, URINE - Abnormal; Notable for the following components:       Result Value    Benzodiazepines Urine Presumed Positive (*)     Opiate Urine Presumed Positive (*)     All other components within normal limits    Narrative:     Results of the Urine Drug Screen should be used only for medical purposes.   URINALYSIS, ROUTINE - Abnormal; Notable for the following components:    Blood Urine 1+ (*)     Protein Urine Trace (*)     RBC Urine >10 (*)     All other components within normal limits   SALICYLATE, SERUM - Abnormal; Notable for the following components:    Salicylate <1.7 (*)     All other components within normal limits   ETHYL ALCOHOL - Abnormal; Notable for the following components:    Ethyl Alcohol 31 (*)     All other components within normal limits   ACETAMINOPHEN (TYLENOL), S - Abnormal; Notable for the following components:    Acetaminophen <2.0 (*)     All other components within normal limits   COMP METABOLIC PANEL (14) - Normal   POCT GLUCOSE - Normal   CBC WITH DIFFERENTIAL WITH PLATELET    Narrative:     The following orders were created for panel order CBC With Differential With Platelet.  Procedure                               Abnormality         Status                     ---------                               -----------         ------                     CBC W/ DIFFERENTIAL[[id number]]                              Final result                 Please view results for these tests on the individual orders.   RAINBOW DRAW LAVENDER   RAINBOW DRAW LIGHT GREEN   RAINBOW DRAW GOLD   CBC W/ DIFFERENTIAL     EKG    Rate, intervals and axes as noted on EKG Report.  Rate: 72  Rhythm: Sinus Rhythm  Reading: , QRS of 94, patient is normal sinus rhythm ischemic change.              ED Course as of 05/03/24 7105  ------------------------------------------------------------  Time: 05/03 1847  Comment: While here the  patient's drug screen was positive for benzodiazepines and opiates.  His CBC was normal.  The patient's urine had red cells without infection.  Tylenol and aspirin levels were normal.  His electrolytes were unremarkable.  Patient had a chest x-ray done that I first interpreted showed no acute cardiopulmonary process.  Patient was given IV fluid as a bolus and observed in the department     XR CHEST AP PORTABLE  (CPT=71045)    Result Date: 5/3/2024  PROCEDURE:  XR CHEST AP PORTABLE  (CPT=71045)  TECHNIQUE:  AP chest radiograph was obtained.  COMPARISON:  [facility] , XR, XR CHEST AP PORTABLE  (CPT=71045), 4/28/2024, 2:38 PM.  [facility] , XR, XR CHEST AP PORTABLE  (CPT=71045), 4/21/2024, 5:57 PM.  INDICATIONS:  WITHDRAWALS FROM HYDROCODONE  PATIENT STATED HISTORY: (As transcribed by Technologist)  Patient offered no additional history at this time.    FINDINGS:  Stable cardiac size.  Mild interstitial opacities.  Left basilar atelectasis.  No pleural effusions.  No pneumothorax.            CONCLUSION:  1. Mild interstitial opacities which may represent vascular crowding due to low inspiratory volumes but mild edema cannot be excluded. 2. Minimal left basilar atelectasis.    LOCATION:  [facility]      Dictated by (CST): Misha Yang MD on 5/03/2024 at 3:56 PM     Finalized by (CST): Misha Yang MD on 5/03/2024 at 3:57 PM       CT ABDOMEN+PELVIS KIDNEYSTONE 2D RNDR(NO IV,NO ORAL)(CPT=74176)    Result Date: 4/29/2024  PROCEDURE:  CT ABDOMEN+PELVIS KIDNEYSTONE 2D RNDR(NO IV,NO ORAL)(CPT=74176)  COMPARISON:  [facility] , CT, CT ABDOMEN PELVIS IV CONTRAST, NO ORAL (ER), 7/06/2023, 6:51 AM.  INDICATIONS:  here yesterday and had jenkins placed, had them remove d/t discomfort, sent by PMD to have catheter replaced for retention, back pain, dysuria  TECHNIQUE:  Unenhanced multislice CT scanning from above the kidneys to below the urinary bladder.  2D rendering are generated on the CT scanner workstation to localize potential stones in  the cranio-caudal plane.  Dose reduction techniques were used. Dose information is transmitted to the ACR (American College of Radiology) NRDR (National Radiology Data Registry) which includes the Dose Index Registry.  PATIENT STATED HISTORY: (As transcribed by Technologist)  Urinary retention.    FINDINGS:  KIDNEYS:  Normal anatomic positions.  Perinephric stranding, left greater than right is similar to the prior and may be chronic sequela of prior inflammation/infection or chronic nephritis.  Two nonobstructing left renal calculi appear similar to the prior, measuring up to 0.5 and 0.6 cm.  No ureteral calculus or hydronephrosis.  BLADDER:  Nondistended.  Leon catheter in place. ADRENALS:  Not enlarged. LIVER:  Uniform parenchyma. BILIARY:  Nondistended gallbladder.  No biliary dilatation. PANCREAS:  Uniform parenchyma.  No ductal dilatation. SPLEEN:  Not enlarged.  Coarse calcifications consistent with prior granulomatous disease. AORTA/VASCULAR:  Mild calcified atherosclerosis.  No abdominal aortic aneurysm. RETROPERITONEUM:  No adenopathy. BOWEL/MESENTERY:  Normal bowel caliber.  Mild colonic diverticulosis.  No acute diverticulitis.  Small amount of scattered stool in colon.  The appendix is not visualized which changes consistent with prior appendectomy.  No ascites. ABDOMINAL WALL:  Diastasis recti. BONES:  Moderate to severe degenerative changes at L5-S1.  Scattered mild to moderate degenerative changes elsewhere.  Normal vertebral body heights without subluxation. PELVIC ORGANS:  No prostatomegaly. LUNG BASES:  Mild scarring is similar to the prior. OTHER:  None.             CONCLUSION:  1. Two nonobstructing left renal calculi. 2. Uncomplicated colonic diverticulosis. 3. Details as above.  Continued clinical correlation recommended.     LOCATION:  [facility]   Dictated by (CST): Ronaldo Thorpe MD on 4/29/2024 at 8:26 PM     Finalized by (CST): Ronaldo Thorpe MD on 4/29/2024 at 8:31 PM       XR CHEST AP PORTABLE   (CPT=71045)    Result Date: 4/28/2024  PROCEDURE:  XR CHEST AP PORTABLE  (CPT=71045)  TECHNIQUE:  AP chest radiograph was obtained.  COMPARISON:  [facility] , XR, XR CHEST AP PORTABLE  (CPT=71045), 4/21/2024, 5:57 PM.  INDICATIONS:  started new medication and is now feeling anxious, can't sit still, short of breath. Able to converse in full sentences. Recently started ciprofloxacin and pyri  PATIENT STATED HISTORY: (As transcribed by Technologist)  Patient stated he has been feeling anxious and having shortness of breath.               CONCLUSION:  Normal heart size and pulmonary vascularity.  Slight atelectasis left lung base.   LOCATION:  [facility]      Dictated by (CST): Eleonora Barker MD on 4/28/2024 at 3:51 PM     Finalized by (CST): Eleonora Barker MD on 4/28/2024 at 3:51 PM       XR CHEST AP PORTABLE  (CPT=71045)    Result Date: 4/21/2024  PROCEDURE:  XR CHEST AP PORTABLE  (CPT=71045)  TECHNIQUE:  AP chest radiograph was obtained.  COMPARISON:  [facility] , XR, XR CHEST AP PORTABLE  (CPT=71045), 5/06/2023, 5:52 PM.  [facility] , XR, XR CHEST PA + LAT CHEST (OGD=26544), 3/14/2023, 2:35 PM.  INDICATIONS:  back pain after lifting  PATIENT STATED HISTORY: (As transcribed by Technologist)  Patient had a fall around 3 weeks prior. He hit his chest on a water bottle and now has a lump on the right upper anterior chest wall.    FINDINGS:  No focal consolidation.  No pleural effusion.  No pneumothorax.  No pulmonary edema.  The cardiomediastinal silhouette is within normal limits.  No acute osseous findings.            CONCLUSION:  No acute radiographic findings.   LOCATION:  [facility]      Dictated by (CST): Campos Granados MD on 4/21/2024 at 7:00 PM     Finalized by (CST): Campos Granados MD on 4/21/2024 at 7:01 PM       XR KNEE (1 OR 2 VIEWS), RIGHT (CPT=73560)    Result Date: 4/21/2024  PROCEDURE:  XR KNEE (1 OR 2 VIEWS), RIGHT (CPT=73560)  COMPARISON:  [facility] , XR, XR KNEE (3 VIEWS), RIGHT (CPT=73562), 9/22/2022, 2:27 PM.   [facility], XR, XR KNEE (1 OR 2 VIEWS), RIGHT (CPT=73560), 9/29/2021, 9:38 AM.  INDICATIONS:  back pain after lifting  PATIENT STATED HISTORY: (As transcribed by Technologist)  Patient has back pain after lifting floor tile.    FINDINGS:  No acute fracture or dislocation.  Joint spaces and bony alignment are maintained.  Small joint effusion.  Superior patellar enthesophyte.            CONCLUSION:  No acute osseous findings.  Small joint effusion.   LOCATION:  [facility]   Dictated by (CST): Campos Granados MD on 4/21/2024 at 6:14 PM     Finalized by (CST): Campos Granados MD on 4/21/2024 at 6:14 PM       XR LUMBAR SPINE (MIN 2 VIEWS) (CPT=72100)    Result Date: 4/21/2024  PROCEDURE:  XR LUMBAR SPINE (MIN 2 VIEWS) (CPT=72100)  TECHNIQUE:  AP, lateral, and coned down L5-S1 views were obtained.  COMPARISON:  [facility] , XR, SPINE LUMBOSACRAL COMPL W/ OBL, 7/02/2007, 6:59 PM.  INDICATIONS:  back pain after lifting  PATIENT STATED HISTORY: (As transcribed by Technologist)  Patient has back pain after lifting floor tile.    FINDINGS:  No significant listhesis.  Vertebral body heights are maintained.  Multilevel degenerative changes with intervertebral disc height loss at L2-L3 and L5-S1.  Facet arthropathy at the lower lumbar levels.            CONCLUSION:  No acute radiographic findings in the lumbar spine.   LOCATION:  [facility]   Dictated by (CST): Campos Granados MD on 4/21/2024 at 6:13 PM     Finalized by (CST): Campos Granados MD on 4/21/2024 at 6:14 PM        Medications   sodium chloride 0.9 % IV bolus 1,000 mL (0 mL Intravenous Stopped 5/3/24 1806)   sodium chloride 0.9 % IV bolus 1,000 mL (0 mL Intravenous Stopped 5/3/24 1551)              MDM      Differential diagnosis included overdose, anxiety, anemia, hypoglycemia, intoxication but not limited such.  While here the patient did just start lofexidine and took too many of them this morning.  In addition he had taken Klonopin for the night before, a small amount of alcohol  as well as Norco which I think added to the symptoms noted above.  He was or observed with borderline blood pressures although improved while here.  The half-life lofexidine is 11 hours.  He is slowly getting more clear and I believe at this time the patient be placed on is a for observation until medication is eliminated from the system.  Discussed this with the hospitalist as well.      Critical Care Note:  The patient arrived with a condition with significant morbidity and mortality associated. The services I provided  were to promote improvement and reduce mortality specifically involving complex record review, complex medical decisions and interventions, and consultations outside the regular procedures and care normally rendered for 45 minutes of critical care time      This note was prepared using Dragon Medical voice recognition dictation software.  As a result errors may occur.  When identified to these areas have been corrected.  While every attempt is made to correct errors during dictation discrepancies may still exist.  Please contact if there are any errors.  Admission disposition: 5/3/2024  6:48 PM                                        Medical Decision Making      Disposition and Plan     Clinical Impression:  1. Polysubstance overdose, accidental or unintentional, initial encounter    2. Altered mental status, unspecified altered mental status type         Disposition:  Admit  5/3/2024  6:48 pm    Follow-up:  No follow-up provider specified.        Medications Prescribed:  Current Discharge Medication List                            Hospital Problems       Present on Admission  Date Reviewed: 4/25/2024            ICD-10-CM Noted POA    * (Principal) Polysubstance overdose, accidental or unintentional, initial encounter T50.901A 5/3/2024 Unknown

## 2024-05-04 PROBLEM — R41.82 ALTERED MENTAL STATUS, UNSPECIFIED ALTERED MENTAL STATUS TYPE: Status: ACTIVE | Noted: 2024-05-04

## 2024-05-04 PROBLEM — R42 LIGHT HEADEDNESS: Status: ACTIVE | Noted: 2024-05-04

## 2024-05-04 PROCEDURE — 99232 SBSQ HOSP IP/OBS MODERATE 35: CPT | Performed by: INTERNAL MEDICINE

## 2024-05-04 RX ORDER — CLONAZEPAM 1 MG/1
2 TABLET ORAL NIGHTLY
Status: DISCONTINUED | OUTPATIENT
Start: 2024-05-04 | End: 2024-05-04

## 2024-05-04 RX ORDER — TAMSULOSIN HYDROCHLORIDE 0.4 MG/1
0.4 CAPSULE ORAL DAILY
Status: DISCONTINUED | OUTPATIENT
Start: 2024-05-04 | End: 2024-05-07

## 2024-05-04 RX ORDER — CLONAZEPAM 1 MG/1
4 TABLET ORAL NIGHTLY
Status: DISCONTINUED | OUTPATIENT
Start: 2024-05-04 | End: 2024-05-07

## 2024-05-04 RX ORDER — CLONAZEPAM 0.5 MG/1
0.5 TABLET ORAL 2 TIMES DAILY PRN
Status: DISCONTINUED | OUTPATIENT
Start: 2024-05-04 | End: 2024-05-07

## 2024-05-04 RX ORDER — MELATONIN
100 DAILY
Status: DISCONTINUED | OUTPATIENT
Start: 2024-05-04 | End: 2024-05-07

## 2024-05-04 RX ORDER — MELATONIN
3 NIGHTLY PRN
Status: DISCONTINUED | OUTPATIENT
Start: 2024-05-04 | End: 2024-05-07

## 2024-05-04 RX ORDER — SENNOSIDES 8.6 MG
17.2 TABLET ORAL NIGHTLY PRN
Status: DISCONTINUED | OUTPATIENT
Start: 2024-05-04 | End: 2024-05-07

## 2024-05-04 RX ORDER — POLYETHYLENE GLYCOL 3350 17 G/17G
17 POWDER, FOR SOLUTION ORAL DAILY PRN
Status: DISCONTINUED | OUTPATIENT
Start: 2024-05-04 | End: 2024-05-07

## 2024-05-04 RX ORDER — SODIUM CHLORIDE 9 MG/ML
INJECTION, SOLUTION INTRAVENOUS CONTINUOUS
Status: DISCONTINUED | OUTPATIENT
Start: 2024-05-04 | End: 2024-05-07

## 2024-05-04 RX ORDER — NORTRIPTYLINE HYDROCHLORIDE 50 MG/1
50 CAPSULE ORAL DAILY
Status: DISCONTINUED | OUTPATIENT
Start: 2024-05-04 | End: 2024-05-07

## 2024-05-04 RX ORDER — HYDROCODONE BITARTRATE AND ACETAMINOPHEN 5; 325 MG/1; MG/1
1 TABLET ORAL EVERY 4 HOURS PRN
Status: DISCONTINUED | OUTPATIENT
Start: 2024-05-04 | End: 2024-05-07

## 2024-05-04 RX ORDER — ACETAMINOPHEN 500 MG
500 TABLET ORAL EVERY 4 HOURS PRN
Status: DISCONTINUED | OUTPATIENT
Start: 2024-05-04 | End: 2024-05-07

## 2024-05-04 RX ORDER — ROSUVASTATIN CALCIUM 20 MG/1
20 TABLET, COATED ORAL NIGHTLY
Status: DISCONTINUED | OUTPATIENT
Start: 2024-05-04 | End: 2024-05-07

## 2024-05-04 RX ORDER — PHENAZOPYRIDINE HYDROCHLORIDE 200 MG/1
200 TABLET, FILM COATED ORAL 3 TIMES DAILY PRN
Status: DISCONTINUED | OUTPATIENT
Start: 2024-05-04 | End: 2024-05-07

## 2024-05-04 RX ORDER — ENOXAPARIN SODIUM 100 MG/ML
40 INJECTION SUBCUTANEOUS DAILY
Status: DISCONTINUED | OUTPATIENT
Start: 2024-05-04 | End: 2024-05-07

## 2024-05-04 RX ORDER — ONDANSETRON 2 MG/ML
4 INJECTION INTRAMUSCULAR; INTRAVENOUS EVERY 6 HOURS PRN
Status: DISCONTINUED | OUTPATIENT
Start: 2024-05-04 | End: 2024-05-07

## 2024-05-04 RX ORDER — PROCHLORPERAZINE EDISYLATE 5 MG/ML
5 INJECTION INTRAMUSCULAR; INTRAVENOUS EVERY 8 HOURS PRN
Status: DISCONTINUED | OUTPATIENT
Start: 2024-05-04 | End: 2024-05-07

## 2024-05-04 RX ORDER — PRAVASTATIN SODIUM 20 MG
20 TABLET ORAL DAILY
Status: DISCONTINUED | OUTPATIENT
Start: 2024-05-04 | End: 2024-05-04

## 2024-05-04 RX ORDER — BISACODYL 10 MG
10 SUPPOSITORY, RECTAL RECTAL
Status: DISCONTINUED | OUTPATIENT
Start: 2024-05-04 | End: 2024-05-07

## 2024-05-04 RX ORDER — ROSUVASTATIN CALCIUM 20 MG/1
20 TABLET, COATED ORAL NIGHTLY
COMMUNITY

## 2024-05-04 RX ORDER — ENEMA 19; 7 G/133ML; G/133ML
1 ENEMA RECTAL ONCE AS NEEDED
Status: DISCONTINUED | OUTPATIENT
Start: 2024-05-04 | End: 2024-05-07

## 2024-05-04 RX ORDER — FOLIC ACID 1 MG/1
1 TABLET ORAL DAILY
Status: DISCONTINUED | OUTPATIENT
Start: 2024-05-04 | End: 2024-05-07

## 2024-05-04 RX ORDER — FLUTICASONE PROPIONATE 50 MCG
1 SPRAY, SUSPENSION (ML) NASAL DAILY
Status: DISCONTINUED | OUTPATIENT
Start: 2024-05-04 | End: 2024-05-07

## 2024-05-04 NOTE — PROGRESS NOTES
05/04/24 0100 05/04/24 0103 05/04/24 0105   Vital Signs   Temp 97.7 °F (36.5 °C)  --   --    Temp src Oral  --   --    Pulse 97  --   --    Heart Rate Source Monitor  --   --    Resp 18  --   --    Respiratory Quality Normal  --   --    /66 122/71 114/73   MAP (mmHg) 80 85 84   BP Location Right arm Right arm Right arm   BP Method Automatic Automatic Automatic   Patient Position Lying Sitting Standing

## 2024-05-04 NOTE — PLAN OF CARE
Pt. To floor from ED. Alert and oriented x4, on RA, NSR on tele. Hx of sleep apnea, does not wear CPAP at home. C/o chronic back pain, PRN norco given per MAR. Orthostatic vitals completed. Still c/o lightheadedness. Up with standby. 0.9 infusing at 100/hr. Lovenox for prophylaxis. Fall and safety precautions in place. Plan of care ongoing.     Problem: METABOLIC/FLUID AND ELECTROLYTES - ADULT  Goal: Hemodynamic stability and optimal renal function maintained  Description: INTERVENTIONS:  - Monitor labs and assess for signs and symptoms of volume excess or deficit  - Monitor intake, output and patient weight  - Monitor urine specific gravity, serum osmolarity and serum sodium as indicated or ordered  - Monitor response to interventions for patient's volume status, including labs, urine output, blood pressure (other measures as available)  - Encourage oral intake as appropriate  - Instruct patient on fluid and nutrition restrictions as appropriate  Outcome: Progressing     Problem: NEUROLOGICAL - ADULT  Goal: Achieves stable or improved neurological status  Description: INTERVENTIONS  - Assess for and report changes in neurological status  - Initiate measures to prevent increased intracranial pressure  - Maintain blood pressure and fluid volume within ordered parameters to optimize cerebral perfusion and minimize risk of hemorrhage  - Monitor temperature, glucose, and sodium. Initiate appropriate interventions as ordered  Outcome: Progressing

## 2024-05-04 NOTE — ED QUICK NOTES
Orders for admission, patient is aware of plan and ready to go upstairs. Any questions, please call ED RN  at extension [phone].     Patient Covid vaccination status: Unvaccinated     COVID Test Ordered in ED: None    COVID Suspicion at Admission: N/A    Running Infusions:  None    Mental Status/LOC at time of transport: a/ox4 up with SB assist    Other pertinent information:   CIWA score: N/A   NIH score:  N/A

## 2024-05-04 NOTE — PLAN OF CARE
Assumed care at 0730.  A&O 4.  Room air.  Fluids- 0.9 at 100 mL.hr.  Regular diet.  Tele- NSR.  VTE- Lovenox, refusing.   PRN pain, urinary urgency and anxiety meds utilized this shift. See MAR for more details.   Psych liaison consulted and to see.   Male purewick in place for urinary frequency and light headedness per pt report.  SBA to the bathroom.    Pt oriented to the room, safety prec initiated, bed is in the lowest position and call light within reach. Pt updated on the plan of care. All needs met at this time.    Problem: NEUROLOGICAL - ADULT  Goal: Achieves stable or improved neurological status  Description: INTERVENTIONS  - Assess for and report changes in neurological status  - Initiate measures to prevent increased intracranial pressure  - Maintain blood pressure and fluid volume within ordered parameters to optimize cerebral perfusion and minimize risk of hemorrhage  - Monitor temperature, glucose, and sodium. Initiate appropriate interventions as ordered  Outcome: Progressing     Problem: SAFETY ADULT - FALL  Goal: Free from fall injury  Description: INTERVENTIONS:  - Assess pt frequently for physical needs  - Identify cognitive and physical deficits and behaviors that affect risk of falls.  - [place] fall precautions as indicated by assessment.  - Educate pt/family on patient safety including physical limitations  - Instruct pt to call for assistance with activity based on assessment  - Modify environment to reduce risk of injury  - Provide assistive devices as appropriate  - Consider OT/PT consult to assist with strengthening/mobility  - Encourage toileting schedule  Outcome: Progressing     Problem: DISCHARGE PLANNING  Goal: Discharge to home or other facility with appropriate resources  Description: INTERVENTIONS:  - Identify barriers to discharge w/pt and caregiver  - Include patient/family/discharge partner in discharge planning  - Arrange for needed discharge resources and transportation as  appropriate  - Identify discharge learning needs (meds, wound care, etc)  - Arrange for interpreters to assist at discharge as needed  - Consider post-discharge preferences of patient/family/discharge partner  - Complete POLST form as appropriate  - Assess patient's ability to be responsible for managing their own health  - Refer to Case Management Department for coordinating discharge planning if the patient needs post-hospital services based on physician/LIP order or complex needs related to functional status, cognitive ability or social support system  Outcome: Progressing

## 2024-05-04 NOTE — PROGRESS NOTES
[facility]   part of [facility]     Hospitalist Progress Note     Tong León Patient Status:  Observation    1960 MRN [id number]   Location [facility] 3NE-A Attending Clay Willis MD   Hosp Day # 0 PCP Mesfin Millan MD     Chief Complaint: lethargy, fatigue    Subjective:     Patient without acute events overnight. Still feels anxious at times and feels like he is going to have panic attack. No F/C. No CP.     Objective:    Review of Systems:   A comprehensive review of systems was completed; pertinent positive and negatives stated in subjective.    Vital signs:  Temp:  [97 °F (36.1 °C)-97.7 °F (36.5 °C)] 97.6 °F (36.4 °C)  Pulse:  [59-97] 77  Resp:  [14-28] 16  BP: ()/() 123/77  SpO2:  [90 %-98 %] 93 %    Physical Exam:    General: No acute distress  Respiratory: No wheezes, no rhonchi  Cardiovascular: S1, S2, regular rate and rhythm  Abdomen: Soft, Non-tender, non-distended, positive bowel sounds  Neuro: No new focal deficits.   Extremities: No edema      Diagnostic Data:    Labs:  Recent Labs   Lab 24  1336 24  1842 24  0616 24  0705 24  1515   WBC 7.3 12.7* 9.1 7.3 7.7   HGB 16.4 16.7 15.2 16.2 15.9   MCV 94.2 93.4 94.2 93.1 93.1   .0 192.0 188.0 177.0 186.0       Recent Labs   Lab 24  1336 24  1842 24  0616 24  0705 24  1515   * 105* 105* 98 94   BUN 30* 31* 25* 13 23   CREATSERUM 0.78 0.92 1.00 0.80 0.80   CA 9.4 9.3 8.9 9.1 8.8   ALB 4.4 4.0  --   --  3.7    136 139 139 139   K 4.0 4.6 4.4 4.1 4.0    105 107 107 105   CO2 24.0 28.0 29.0 27.0 25.0   ALKPHO 72 70  --   --  62   AST 26 44*  --   --  19   ALT 34 54  --   --  42   BILT 0.4 0.8  --   --  0.6   TP 7.5 7.4  --   --  6.5       Estimated Creatinine Clearance: 96.3 mL/min (based on SCr of 0.8 mg/dL).    Recent Labs   Lab 24  1336 24  1518   TROPHS 6 7       No results for input(s): \"PTP\", \"INR\" in the last 168  hours.               Microbiology    No results found for this visit on 05/03/24.      Imaging: Reviewed in Epic.    Medications:    folic acid  1 mg Oral Daily    nortriptyline  50 mg Oral Daily    tamsulosin  0.4 mg Oral Daily    thiamine  100 mg Oral Daily    sertraline  50 mg Oral Daily    enoxaparin  40 mg Subcutaneous Daily    clonazePAM  4 mg Oral Nightly    rosuvastatin  20 mg Oral Nightly       Assessment & Plan:      #Light-headedness  -Likely due to polypharmacy - lofexidine/norco/klonopin/alcohol  -Hold lofexidine as newly started and can cause orthostatic hypotension  -Check orthostatics  -IVF  -psych to eval     #BPH with LUTS  -Following with urology OP     #Anxiety  -SSRI     #Essential HTN  -Hold ARB     #Carotid stenosis s/p TCAR 3/23  -Plavix/statin       Clay Willis MD    Supplementary Documentation:     Quality:  DVT Mechanical Prophylaxis:     Early ambuation  DVT Pharmacologic Prophylaxis   Medication    enoxaparin (Lovenox) 40 MG/0.4ML SUBQ injection 40 mg                Code Status: Full Code  Leon: No urinary catheter in place  Leon Duration (in days):   Central line:    LANDON: 5/5/2024    Discharge is dependent on: progress  At this point Mr. León is expected to be discharge to: home    The 21st Century Cures Act makes medical notes like these available to patients in the interest of transparency. Please be advised this is a medical document. Medical documents are intended to carry relevant information, facts as evident, and the clinical opinion of the practitioner. The medical note is intended as peer to peer communication and may appear blunt or direct. It is written in medical language and may contain abbreviations or verbiage that are unfamiliar.

## 2024-05-05 PROCEDURE — 99232 SBSQ HOSP IP/OBS MODERATE 35: CPT | Performed by: INTERNAL MEDICINE

## 2024-05-05 RX ORDER — LOSARTAN POTASSIUM 50 MG/1
50 TABLET ORAL DAILY
Status: DISCONTINUED | OUTPATIENT
Start: 2024-05-05 | End: 2024-05-07

## 2024-05-05 NOTE — PLAN OF CARE
Assumed pt care this morning at 0730.  Pt is A&Ox4, following commands.   Pt on room air, VSS.  NSR on tele.  Pt c/o pain. Norco given for pain.  Pt one person assist.  Pt on regular diet. Tolerating diet.   L forearm NS at 100 mL/hr.  Bed in lowest position, call light in reach.        Problem: METABOLIC/FLUID AND ELECTROLYTES - ADULT  Goal: Hemodynamic stability and optimal renal function maintained  Description: INTERVENTIONS:  - Monitor labs and assess for signs and symptoms of volume excess or deficit  - Monitor intake, output and patient weight  - Monitor urine specific gravity, serum osmolarity and serum sodium as indicated or ordered  - Monitor response to interventions for patient's volume status, including labs, urine output, blood pressure (other measures as available)  - Encourage oral intake as appropriate  - Instruct patient on fluid and nutrition restrictions as appropriate  Outcome: Progressing     Problem: NEUROLOGICAL - ADULT  Goal: Achieves stable or improved neurological status  Description: INTERVENTIONS  - Assess for and report changes in neurological status  - Initiate measures to prevent increased intracranial pressure  - Maintain blood pressure and fluid volume within ordered parameters to optimize cerebral perfusion and minimize risk of hemorrhage  - Monitor temperature, glucose, and sodium. Initiate appropriate interventions as ordered  Outcome: Progressing     Problem: SAFETY ADULT - FALL  Goal: Free from fall injury  Description: INTERVENTIONS:  - Assess pt frequently for physical needs  - Identify cognitive and physical deficits and behaviors that affect risk of falls.  - [place] fall precautions as indicated by assessment.  - Educate pt/family on patient safety including physical limitations  - Instruct pt to call for assistance with activity based on assessment  - Modify environment to reduce risk of injury  - Provide assistive devices as appropriate  - Consider OT/PT consult to  assist with strengthening/mobility  - Encourage toileting schedule  Outcome: Progressing

## 2024-05-05 NOTE — PROGRESS NOTES
[facility]   part of [facility]     Hospitalist Progress Note     Tong León Patient Status:  Observation    1960 MRN [id number]   Location [facility] 3NE-A Attending Clay Willis MD   Hosp Day # 0 PCP Mesfin Millan MD     Chief Complaint: lethargy, fatigue    Subjective:     Patient without acute events overnight. More awake and alert this morning. ANxiety is less.     Objective:    Review of Systems:   A comprehensive review of systems was completed; pertinent positive and negatives stated in subjective.    Vital signs:  Temp:  [97.4 °F (36.3 °C)-97.8 °F (36.6 °C)] 97.8 °F (36.6 °C)  Pulse:  [60-90] 60  Resp:  [16-18] 17  BP: (109-141)/(83-88) 141/83  SpO2:  [94 %-97 %] 97 %    Physical Exam:    General: No acute distress  Respiratory: No wheezes, no rhonchi  Cardiovascular: S1, S2, regular rate and rhythm  Abdomen: Soft, Non-tender, non-distended, positive bowel sounds  Neuro: No new focal deficits.   Extremities: No edema      Diagnostic Data:    Labs:  Recent Labs   Lab 24  1336 24  1842 24  0616 24  0705 24  1515   WBC 7.3 12.7* 9.1 7.3 7.7   HGB 16.4 16.7 15.2 16.2 15.9   MCV 94.2 93.4 94.2 93.1 93.1   .0 192.0 188.0 177.0 186.0       Recent Labs   Lab 24  1336 24  1842 24  0616 24  0705 24  1515   * 105* 105* 98 94   BUN 30* 31* 25* 13 23   CREATSERUM 0.78 0.92 1.00 0.80 0.80   CA 9.4 9.3 8.9 9.1 8.8   ALB 4.4 4.0  --   --  3.7    136 139 139 139   K 4.0 4.6 4.4 4.1 4.0    105 107 107 105   CO2 24.0 28.0 29.0 27.0 25.0   ALKPHO 72 70  --   --  62   AST 26 44*  --   --  19   ALT 34 54  --   --  42   BILT 0.4 0.8  --   --  0.6   TP 7.5 7.4  --   --  6.5       Estimated Creatinine Clearance: 96.3 mL/min (based on SCr of 0.8 mg/dL).    Recent Labs   Lab 24  1336 24  1518   TROPHS 6 7       No results for input(s): \"PTP\", \"INR\" in the last 168 hours.               Microbiology    No  results found for this visit on 05/03/24.      Imaging: Reviewed in Epic.    Medications:    folic acid  1 mg Oral Daily    nortriptyline  50 mg Oral Daily    tamsulosin  0.4 mg Oral Daily    thiamine  100 mg Oral Daily    sertraline  50 mg Oral Daily    enoxaparin  40 mg Subcutaneous Daily    clonazePAM  4 mg Oral Nightly    rosuvastatin  20 mg Oral Nightly    [Held by provider] Cariprazine HCl  1 capsule Oral Daily    fluticasone propionate  1 spray Each Nare Daily       Assessment & Plan:      #Light-headedness  -Likely due to polypharmacy - lofexidine/norco/klonopin/alcohol  -Hold lofexidine as newly started   -IVF  -psych to eval     #BPH with LUTS  -Following with urology OP     #Anxiety  -SSRI     #Essential HTN  -resume home meds     #Carotid stenosis s/p TCAR 3/23  -Plavix/statin       Clay Willis MD    Supplementary Documentation:     Quality:  DVT Mechanical Prophylaxis:     Early ambuation  DVT Pharmacologic Prophylaxis   Medication    enoxaparin (Lovenox) 40 MG/0.4ML SUBQ injection 40 mg                Code Status: Full Code  Leon: External urinary catheter in place  Leon Duration (in days):   Central line:    LANDON: 5/5/2024    Discharge is dependent on: progress  At this point Mr. León is expected to be discharge to: home    The 21st Century Cures Act makes medical notes like these available to patients in the interest of transparency. Please be advised this is a medical document. Medical documents are intended to carry relevant information, facts as evident, and the clinical opinion of the practitioner. The medical note is intended as peer to peer communication and may appear blunt or direct. It is written in medical language and may contain abbreviations or verbiage that are unfamiliar.

## 2024-05-06 LAB
BILIRUB UR QL STRIP.AUTO: NEGATIVE
CLARITY UR REFRACT.AUTO: CLEAR
GLUCOSE UR STRIP.AUTO-MCNC: NORMAL MG/DL
KETONES UR STRIP.AUTO-MCNC: NEGATIVE MG/DL
LEUKOCYTE ESTERASE UR QL STRIP.AUTO: NEGATIVE
NITRITE UR QL STRIP.AUTO: NEGATIVE
PH UR STRIP.AUTO: 5.5 [PH] (ref 5–8)
PROT UR STRIP.AUTO-MCNC: NEGATIVE MG/DL
RBC UR QL AUTO: NEGATIVE
SP GR UR STRIP.AUTO: 1.01 (ref 1–1.03)
UROBILINOGEN UR STRIP.AUTO-MCNC: NORMAL MG/DL

## 2024-05-06 PROCEDURE — 99232 SBSQ HOSP IP/OBS MODERATE 35: CPT | Performed by: INTERNAL MEDICINE

## 2024-05-06 PROCEDURE — 90792 PSYCH DIAG EVAL W/MED SRVCS: CPT | Performed by: PHYSICIAN ASSISTANT

## 2024-05-06 NOTE — PROGRESS NOTES
[facility]   part of [facility]     Hospitalist Progress Note     Tong León Patient Status:  Observation    1960 MRN [id number]   Location [facility] 3NE-A Attending Clay Willis MD   Hosp Day # 0 PCP Mesfin Millan MD     Chief Complaint: lethargy, fatigue    Subjective:     Patient without acute events overnight. More awake and alert this morning. Awaiting to talk to psych. Has noticed he is urinating almost every 10 minutes    Objective:    Review of Systems:   A comprehensive review of systems was completed; pertinent positive and negatives stated in subjective.    Vital signs:  Temp:  [96.8 °F (36 °C)-98.9 °F (37.2 °C)] 96.8 °F (36 °C)  Pulse:  [79-93] 93  Resp:  [18] 18  BP: (111-134)/(69-87) 134/87  SpO2:  [97 %-98 %] 98 %    Physical Exam:    General: No acute distress  Respiratory: No wheezes, no rhonchi  Cardiovascular: S1, S2, regular rate and rhythm  Abdomen: Soft, Non-tender, non-distended, positive bowel sounds  Neuro: No new focal deficits.   Extremities: No edema      Diagnostic Data:    Labs:  Recent Labs   Lab 24  1842 24  0616 24  0705 24  1515   WBC 12.7* 9.1 7.3 7.7   HGB 16.7 15.2 16.2 15.9   MCV 93.4 94.2 93.1 93.1   .0 188.0 177.0 186.0       Recent Labs   Lab 24  1842 24  0616 24  0705 24  1515   * 105* 98 94   BUN 31* 25* 13 23   CREATSERUM 0.92 1.00 0.80 0.80   CA 9.3 8.9 9.1 8.8   ALB 4.0  --   --  3.7    139 139 139   K 4.6 4.4 4.1 4.0    107 107 105   CO2 28.0 29.0 27.0 25.0   ALKPHO 70  --   --  62   AST 44*  --   --  19   ALT 54  --   --  42   BILT 0.8  --   --  0.6   TP 7.4  --   --  6.5       Estimated Creatinine Clearance: 96.3 mL/min (based on SCr of 0.8 mg/dL).    No results for input(s): \"TROP\", \"TROPHS\", \"CK\" in the last 168 hours.      No results for input(s): \"PTP\", \"INR\" in the last 168 hours.               Microbiology    No results found for this visit on  05/03/24.      Imaging: Reviewed in Epic.    Medications:    losartan  50 mg Oral Daily    folic acid  1 mg Oral Daily    nortriptyline  50 mg Oral Daily    tamsulosin  0.4 mg Oral Daily    thiamine  100 mg Oral Daily    sertraline  50 mg Oral Daily    enoxaparin  40 mg Subcutaneous Daily    clonazePAM  4 mg Oral Nightly    rosuvastatin  20 mg Oral Nightly    [Held by provider] Cariprazine HCl  1 capsule Oral Daily    fluticasone propionate  1 spray Each Nare Daily       Assessment & Plan:      #Light-headedness  -Likely due to polypharmacy - lofexidine/norco/klonopin/alcohol  -Hold lofexidine as newly started   -IVF  -psych to eval     #BPH with LUTS  -Following with urology OP     #Anxiety  -SSRI     #Essential HTN  -resume home meds     #Carotid stenosis s/p TCAR 3/23  -Plavix/statin       Clay Willis MD    Supplementary Documentation:     Quality:  DVT Mechanical Prophylaxis:     Early ambuation  DVT Pharmacologic Prophylaxis   Medication    enoxaparin (Lovenox) 40 MG/0.4ML SUBQ injection 40 mg                Code Status: Full Code  Leon: External urinary catheter in place  Leon Duration (in days):   Central line:    LANDON: 5/5/2024    Discharge is dependent on: progress  At this point Mr. León is expected to be discharge to: home    The 21st Century Cures Act makes medical notes like these available to patients in the interest of transparency. Please be advised this is a medical document. Medical documents are intended to carry relevant information, facts as evident, and the clinical opinion of the practitioner. The medical note is intended as peer to peer communication and may appear blunt or direct. It is written in medical language and may contain abbreviations or verbiage that are unfamiliar.

## 2024-05-06 NOTE — CONSULTS
[facility]  Report of Psychiatric Consultation    Tong León Patient Status:  Observation    1960 MRN [id number]   Location [facility] 3NE-A Attending Clay Willis MD   Hosp Day # 0 PCP Mesfin Millan MD     Date of Admission: 5/3/2024  Date of Consult: 2024    Reason for Consultation:   Unintentional overdose on Klonopin, Norco, and Lucemyra with alcohol.    Impression:    Primary Psychiatric Diagnosis:  Anxiety disorder with panic attacks  -chronic history, has been taking 4mg of Klonopin nightly to assist with anxiety/panic and sleep     Secondary Psychiatric Diagnoses:  Depression, unspecified    History of alcohol use disorder  Alcohol use, uncomplicated     Rule Out Diagnoses:  Benzodiazepine use disorder  Overuse of opioids?  Alcohol use disorder    Personality Traits:  Deferred     Pertinent Medical Diagnoses:  Benign prostatic hypertrophy  Hypertension  Carotid stenosis  Reported history of low back pain, degenerative disc disease    Recommendations:  1) Increase Zoloft to 75mg by mouth daily, for anxiety and depression.    2) Discontinue Vraylar.  Patient recently began taking this medication and has not realized any efficacy.  Further, it is not covered by insurance nor is there an affordable generic thus patient unsure if he is able to continue taking this medication.  -Patient could consult with his provider regarding Abilify as a suitable and affordable alternative.    3) Continue Klonopin 4mg by mouth, nightly for panic and sleep.  -EXTENSIVE education provided regarding the danger of co-administration of Klonopin with Norco (or other opiates), including but not limited to CNS depression that could lead to death.  Furthermore, educated patient of the risks of drinking alcohol with these medications.  -Patient indicates he would like to taper his Klonopin to a more reasonable dose with guidance of his outpatient provider.    4) No need for Lucemyra.  Patient is not  currently withdrawing from opiates.  -If patient is taking more opiates than he is reporting and begins to experience withdrawal symptoms, buprenorphine would be a better treatment approach.    5) Patient would benefit from connecting with a psychiatrist for management of psychiatric medications.  He would also benefit from connecting with a therapist for better control and management of his anxiety/panic, particularly if he is having to use Klonopin and alcohol to palliate symptoms.  Referrals to be placed in the discharge summary.      Marge Bower PA-C, CAQ-Psychiatry  5/6/2024  2:14 PM      History of Present Illness:  64-year-old male with history of anxiety, panic, depression and PTSD as well as history of alcohol use and possible overuse of benzodiazepines and opiates is seen for consult.    Psychiatric history is remarkable for 1 inpatient hospitalization in 2018 for \"taking more Klonopin than prescribed\".  PCP manages his psychiatric medications, which include Zoloft, Lyrica, nortriptyline, Klonopin, and Vraylar.    Patient came to the ED 3 days ago for possible withdrawal from hydrocodone, as well as weak, light-headed, and lethargic.  He had been attempting to wean from Norco at the direction of his PCP who felt he was experiencing withdrawal, thus Lucemyra was started.  Patient took 1 tablet but did not experience any relief from symptoms so he took 3-4 more.  He also co-ingested with Klonopin and drank alcohol.    He tells me this was an unintentional overdose.  He is not suicidal and does not want to die.  He admits to feeling anxious and has a history of panic attacks.  He tells me he has been taking Klonopin 4mg at bedtime to assist with sleep.    There is a history of substance use/abuse.  Patient tells me that 20 years ago he was on Norco for 2 years straight due to DJD and chronic pain.  He was able to wean himself at that time.  He then states he was started back on the Norco with short  supply about 1.5 years ago with more frequent use within the past 6 months.  He also has a history of heavy alcohol use beginning on 2001.  He had maintained 3 years of recovery until just recently.  He alludes that he has a history of taking more Klonopin by telling me he \"has run out early\".    Past Psychiatric History:  In 2018, patient was hospitalized for 6-8 days at [facility] for \"overtaking my Klonopin\".  He notes that his FOID card was revoked and he lost access to his guns.  No history of day program or rehab (detox or residential).  His PCP manages his psychiatric medications.  He tells me he saw a psychiatrist once but \"I made a mistake\".  He participated in therapy at the age of 34 for about 1.5 years.    Substance Use History:  Patient is vague about details.  History of heavy alcohol use.  In recovery for 3 years but drank just prior to this admissions.  History of higher-dose benzodiazepine use, takes 4mg at night.  Admits to having times when he runs out early due to taking more than prescribed.    Psych Family History:  Denies    Social and Developmental History:   Lives in [place] with his dog.  He is .  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    Past Medical History:    Anxiety    Back problem    Calculus of kidney    Coronary atherosclerosis    Depression    Enlarged prostate    High blood pressure    High cholesterol    NIDIA (obstructive sleep apnea)    Other and unspecified hyperlipidemia    Sleep apnea    Unspecified essential hypertension    Urinary retention    Visual impairment     Past Surgical History:   Procedure Laterality Date    Cath bare metal stent (bms)      Removal of lung,lobectomy      Stent placemt retro carotid Right      Family History   Problem Relation Age of Onset    Heart Disorder Mother     Diabetes Paternal Grandmother       reports that he has quit smoking. He has never used smokeless tobacco. He reports  that he does not currently use alcohol. He reports that he does not use drugs.    Allergies:  No Known Allergies    Medications:    Current Facility-Administered Medications:     losartan (Cozaar) tab 50 mg, 50 mg, Oral, Daily    folic acid (Folvite) tab 1 mg, 1 mg, Oral, Daily    HYDROcodone-acetaminophen (Norco) 5-325 MG per tab 1 tablet, 1 tablet, Oral, Q4H PRN    nortriptyline (Pamelor) cap 50 mg, 50 mg, Oral, Daily    phenazopyridine (Pyridum) tab 200 mg, 200 mg, Oral, TID PRN    tamsulosin (Flomax) cap 0.4 mg, 0.4 mg, Oral, Daily    thiamine (Vitamin B1) tab 100 mg, 100 mg, Oral, Daily    sertraline (Zoloft) tab 50 mg, 50 mg, Oral, Daily    melatonin tab 3 mg, 3 mg, Oral, Nightly PRN    sodium chloride 0.9% infusion, , Intravenous, Continuous    enoxaparin (Lovenox) 40 MG/0.4ML SUBQ injection 40 mg, 40 mg, Subcutaneous, Daily    acetaminophen (Tylenol Extra Strength) tab 500 mg, 500 mg, Oral, Q4H PRN    polyethylene glycol (PEG 3350) (Miralax) 17 g oral packet 17 g, 17 g, Oral, Daily PRN    sennosides (Senokot) tab 17.2 mg, 17.2 mg, Oral, Nightly PRN    bisacodyl (Dulcolax) 10 MG rectal suppository 10 mg, 10 mg, Rectal, Daily PRN    fleet enema (Fleet) 7-19 GM/118ML rectal enema 133 mL, 1 enema, Rectal, Once PRN    ondansetron (Zofran) 4 MG/2ML injection 4 mg, 4 mg, Intravenous, Q6H PRN    prochlorperazine (Compazine) 10 MG/2ML injection 5 mg, 5 mg, Intravenous, Q8H PRN    clonazePAM (KlonoPIN) tab 4 mg, 4 mg, Oral, Nightly    rosuvastatin (Crestor) tab 20 mg, 20 mg, Oral, Nightly    clonazePAM (KlonoPIN) tab 0.5 mg, 0.5 mg, Oral, BID PRN    [Held by provider] Cariprazine (Vraylar) 1.5 mg oral capsule [Patient own med], 1 capsule, Oral, Daily    fluticasone propionate (Flonase) 50 MCG/ACT nasal suspension 1 spray, 1 spray, Each Nare, Daily    Review of Systems   Psychiatric/Behavioral:  Positive for substance abuse. Negative for depression, hallucinations and suicidal ideas. The patient is nervous/anxious and  has insomnia.    All other systems reviewed and are negative.        Mental Status Exam:     Risk Assessment  Suicidal ideation: no suicidal ideation  Homicidal ideation: None noted    Objective      Vitals:    05/06/24 1215   BP: (!) 144/92   Pulse: 98   Resp: 18   Temp: 96.8 °F (36 °C)     Appearance: unremarkable and normal grooming  Behavior: unremarkable  Attitude: cooperative and inconsistent  Gait: Normal    Speech: normal rate, rhythm and volume    Mood:  mildly anxious  Affect: Congruent    Thought process: tangential  Thought content: no delusions, obsessions, or other thought abnormalities  Perceptions: no hallucinations  Associations: Intact    Orientation: Alert and Oriented person, place, time, situation  Attention and Concentration:   good  Memory:  intact as evidenced by ability to give a detailed history  Language: English, normal  Fund of Knowledge:  average as evidenced by language usage and ability to communicate with provider  Insight: Fair but impaired   Judgment: Fair but impaired     Laboratory Data:       STUDIES:         Itraconazole Pregnancy And Lactation Text: This medication is Pregnancy Category C and it isn't know if it is safe during pregnancy. It is also excreted in breast milk.

## 2024-05-06 NOTE — PLAN OF CARE
The patient is A/Ox4   On RA, no SOB  Tele - NSR/ST  Vitals Stable  Afebrile  No c/o of pain  Frequent urination, UA c/s ordered and is pending  New consult to psych today for unintentional overdose  The patient is ambulatory at baseline  Refusing Lovenox  IVF per MAR  Safety precautions in place. Staff will continue to monitor.           Problem: Patient/Family Goals  Goal: Patient/Family Long Term Goal  Description: Patient's Long Term Goal: dc    Interventions:  - medications   - See additional Care Plan goals for specific interventions  5/6/2024 1726 by Malick Anand RN  Outcome: Progressing  5/6/2024 1043 by Malick Anand RN  Outcome: Progressing  Goal: Patient/Family Short Term Goal  Description: Patient's Short Term Goal: Safety    Interventions:   - Frequent rounding   - See additional Care Plan goals for specific interventions  5/6/2024 1726 by Malick Anand RN  Outcome: Progressing  5/6/2024 1043 by Malick Anand RN  Outcome: Progressing     Problem: METABOLIC/FLUID AND ELECTROLYTES - ADULT  Goal: Hemodynamic stability and optimal renal function maintained  Description: INTERVENTIONS:  - Monitor labs and assess for signs and symptoms of volume excess or deficit  - Monitor intake, output and patient weight  - Monitor urine specific gravity, serum osmolarity and serum sodium as indicated or ordered  - Monitor response to interventions for patient's volume status, including labs, urine output, blood pressure (other measures as available)  - Encourage oral intake as appropriate  - Instruct patient on fluid and nutrition restrictions as appropriate  5/6/2024 1726 by Malick Anand RN  Outcome: Progressing  5/6/2024 1043 by Malick Anand RN  Outcome: Progressing     Problem: NEUROLOGICAL - ADULT  Goal: Achieves stable or improved neurological status  Description: INTERVENTIONS  - Assess for and report changes in neurological status  - Initiate measures to prevent increased  intracranial pressure  - Maintain blood pressure and fluid volume within ordered parameters to optimize cerebral perfusion and minimize risk of hemorrhage  - Monitor temperature, glucose, and sodium. Initiate appropriate interventions as ordered  5/6/2024 1726 by Malick Anand RN  Outcome: Progressing  5/6/2024 1043 by Malick Anand RN  Outcome: Progressing     Problem: SAFETY ADULT - FALL  Goal: Free from fall injury  Description: INTERVENTIONS:  - Assess pt frequently for physical needs  - Identify cognitive and physical deficits and behaviors that affect risk of falls.  - [place] fall precautions as indicated by assessment.  - Educate pt/family on patient safety including physical limitations  - Instruct pt to call for assistance with activity based on assessment  - Modify environment to reduce risk of injury  - Provide assistive devices as appropriate  - Consider OT/PT consult to assist with strengthening/mobility  - Encourage toileting schedule  5/6/2024 1726 by Malick Anand RN  Outcome: Progressing  5/6/2024 1043 by Malick Anand RN  Outcome: Progressing     Problem: DISCHARGE PLANNING  Goal: Discharge to home or other facility with appropriate resources  Description: INTERVENTIONS:  - Identify barriers to discharge w/pt and caregiver  - Include patient/family/discharge partner in discharge planning  - Arrange for needed discharge resources and transportation as appropriate  - Identify discharge learning needs (meds, wound care, etc)  - Arrange for interpreters to assist at discharge as needed  - Consider post-discharge preferences of patient/family/discharge partner  - Complete POLST form as appropriate  - Assess patient's ability to be responsible for managing their own health  - Refer to Case Management Department for coordinating discharge planning if the patient needs post-hospital services based on physician/LIP order or complex needs related to functional status, cognitive ability  or social support system  5/6/2024 1726 by Malick Anand, RN  Outcome: Progressing  5/6/2024 1043 by Malick Anand, RN  Outcome: Progressing

## 2024-05-07 VITALS
WEIGHT: 247.31 LBS | OXYGEN SATURATION: 98 % | BODY MASS INDEX: 35 KG/M2 | DIASTOLIC BLOOD PRESSURE: 78 MMHG | SYSTOLIC BLOOD PRESSURE: 151 MMHG | RESPIRATION RATE: 18 BRPM | TEMPERATURE: 98 F | HEART RATE: 87 BPM

## 2024-05-07 PROBLEM — F41.1 GENERALIZED ANXIETY DISORDER WITH PANIC ATTACKS: Status: ACTIVE | Noted: 2024-05-07

## 2024-05-07 PROBLEM — F41.0 GENERALIZED ANXIETY DISORDER WITH PANIC ATTACKS: Status: ACTIVE | Noted: 2024-05-07

## 2024-05-07 PROBLEM — F10.90 ALCOHOL USE, UNSPECIFIED, UNCOMPLICATED: Status: ACTIVE | Noted: 2024-05-07

## 2024-05-07 PROCEDURE — 99232 SBSQ HOSP IP/OBS MODERATE 35: CPT | Performed by: PHYSICIAN ASSISTANT

## 2024-05-07 PROCEDURE — 99239 HOSP IP/OBS DSCHRG MGMT >30: CPT | Performed by: INTERNAL MEDICINE

## 2024-05-07 RX ORDER — SERTRALINE HYDROCHLORIDE 25 MG/1
75 TABLET, FILM COATED ORAL DAILY
Qty: 90 TABLET | Refills: 0 | Status: SHIPPED | OUTPATIENT
Start: 2024-05-08 | End: 2024-06-07

## 2024-05-07 NOTE — PROGRESS NOTES
[facility]   part of [facility]     Hospitalist Progress Note     Tong León Patient Status:  Observation    1960 MRN [id number]   Location [facility] 3NE-A Attending Clay Willis MD   Hosp Day # 0 PCP Mesfin Millan MD     Chief Complaint: lethargy, fatigue    Subjective:     Patient without acute events overnight. More awake and alert this morning. Hopes to go home.     Objective:    Review of Systems:   A comprehensive review of systems was completed; pertinent positive and negatives stated in subjective.    Vital signs:  Temp:  [96.8 °F (36 °C)-98.1 °F (36.7 °C)] 97.5 °F (36.4 °C)  Pulse:  [] 72  Resp:  [17-18] 18  BP: (119-145)/(76-94) 132/79  SpO2:  [95 %-98 %] 98 %    Physical Exam:    General: No acute distress  Respiratory: No wheezes, no rhonchi  Cardiovascular: S1, S2, regular rate and rhythm  Abdomen: Soft, Non-tender, non-distended, positive bowel sounds  Neuro: No new focal deficits.   Extremities: No edema      Diagnostic Data:    Labs:  Recent Labs   Lab 24  0705 24  1515   WBC 7.3 7.7   HGB 16.2 15.9   MCV 93.1 93.1   .0 186.0       Recent Labs   Lab 24  0705 24  1515   GLU 98 94   BUN 13 23   CREATSERUM 0.80 0.80   CA 9.1 8.8   ALB  --  3.7    139   K 4.1 4.0    105   CO2 27.0 25.0   ALKPHO  --  62   AST  --  19   ALT  --  42   BILT  --  0.6   TP  --  6.5       Estimated Creatinine Clearance: 96.3 mL/min (based on SCr of 0.8 mg/dL).    No results for input(s): \"TROP\", \"TROPHS\", \"CK\" in the last 168 hours.      No results for input(s): \"PTP\", \"INR\" in the last 168 hours.               Microbiology    No results found for this visit on 24.      Imaging: Reviewed in Epic.    Medications:    sertraline  75 mg Oral Daily    losartan  50 mg Oral Daily    folic acid  1 mg Oral Daily    nortriptyline  50 mg Oral Daily    tamsulosin  0.4 mg Oral Daily    thiamine  100 mg Oral Daily    enoxaparin  40 mg Subcutaneous Daily     clonazePAM  4 mg Oral Nightly    rosuvastatin  20 mg Oral Nightly    fluticasone propionate  1 spray Each Nare Daily       Assessment & Plan:      #Light-headedness  -Likely due to polypharmacy - lofexidine/norco/klonopin/alcohol  -Hold lofexidine as newly started   -IVF  -psych to eval, adjusted meds  -plan for follow up     #BPH with LUTS  -Following with urology OP     #Anxiety  -SSRI     #Essential HTN  -resume home meds     #Carotid stenosis s/p TCAR 3/23  -Plavix/statin       Clay Willis MD    Supplementary Documentation:     Quality:  DVT Mechanical Prophylaxis:     Early ambuation  DVT Pharmacologic Prophylaxis   Medication    enoxaparin (Lovenox) 40 MG/0.4ML SUBQ injection 40 mg                Code Status: Full Code  Leon: No urinary catheter in place  Leon Duration (in days):   Central line:    LANDON: 5/5/2024    Discharge is dependent on: progress  At this point Mr. León is expected to be discharge to: home    The 21st Century Cures Act makes medical notes like these available to patients in the interest of transparency. Please be advised this is a medical document. Medical documents are intended to carry relevant information, facts as evident, and the clinical opinion of the practitioner. The medical note is intended as peer to peer communication and may appear blunt or direct. It is written in medical language and may contain abbreviations or verbiage that are unfamiliar.

## 2024-05-07 NOTE — DISCHARGE INSTRUCTIONS
Medicare Referrals for Psychiatry and Counseling     Dr. Chris Dickey, DAmandaO. - Psychiatry  [address] [place] [address]  [phone]    [facility]  Multiple: [address], [address], [place], [place] -or- [address], [address], [place], [place] -or - [address], [place], [place] -or- [address], [place], [place]  [phone]    [facility], [facility]  [address], [address], [place], [place]  [phone]    [facility]  [address], [place], [place]  [phone]    [facility], [facility]  [address], [address], [place], [place]  [phone]    Merged with [facility] Locations: [facility] and [facility] Locations: [facility], [facility]   [phone]    [facility]   [address] [place] [address], [address], [place]  [phone]

## 2024-05-07 NOTE — PLAN OF CARE
[facility]  Report of Psychiatric Consultation    Tong León Patient Status:  Observation    1960 MRN [id number]   Location [facility] 3NE-A Attending Clay Willis MD   Hosp Day # 0 PCP Mesfin Millan MD     Date of Admission: 5/3/2024  Date of Consult: 2024    Reason for Consultation:   Unintentional overdose on Klonopin, Norco, and Lucemyra with alcohol.    Impression:    Primary Psychiatric Diagnosis:  Anxiety disorder with panic attacks  -chronic history, has been taking 4mg of Klonopin nightly to assist with anxiety/panic and sleep     Secondary Psychiatric Diagnoses:  Depression, unspecified    History of PTSD    History of alcohol use disorder  Alcohol use, uncomplicated     Rule Out Diagnoses:  Benzodiazepine use disorder  Overuse of opioids?  Alcohol use disorder    Personality Traits:  Deferred     Pertinent Medical Diagnoses:  Benign prostatic hypertrophy  Hypertension  Carotid stenosis  Reported history of low back pain, degenerative disc disease    Recommendations:  1) Increase Zoloft to 75mg by mouth daily, for anxiety and depression.    2) Discontinue Vraylar.  Patient recently began taking this medication and has not realized any efficacy.  Further, it is not covered by insurance nor is there an affordable generic thus patient unsure if he is able to continue taking this medication.  -Patient could consult with his provider regarding Abilify as a suitable and affordable alternative.    3) Continue Klonopin 4mg by mouth, nightly for panic and sleep.  -EXTENSIVE education provided regarding the danger of co-administration of Klonopin with Norco (or other opiates), including but not limited to CNS depression that could lead to death.  Furthermore, educated patient of the risks of drinking alcohol with these medications.  -Patient indicates he would like to taper his Klonopin to a more reasonable dose with guidance of his outpatient provider.    4) No need for Lucemyra.   Patient is not currently withdrawing from opiates.  -If patient is taking more opiates than he is reporting and begins to experience withdrawal symptoms, buprenorphine would be a better treatment approach.    5) Patient would benefit from connecting with a psychiatrist for management of psychiatric medications.  He would also benefit from connecting with a therapist for better control and management of his anxiety/panic, particularly if he is having to use Klonopin and alcohol to palliate symptoms.  Referrals to be placed in the discharge summary.    FULL NOTE TO FOLLOW    Marge Bower PA-C, CAQ-Psych

## 2024-05-07 NOTE — PLAN OF CARE
Assumed care at 0930  A/O x 4  RA  Denies weakness / lightheadedness  Regular diet  Up sba / ind  Complains of back pain - PRN norco given per MAR  Increased urinary frequency - urinals at bedside  Non-cardiac EP  Refuses lovenox  IVF stopped  Awaiting psych clearance for dc. All needs met. Pt updated. Will continue with plan of care.    Problem: METABOLIC/FLUID AND ELECTROLYTES - ADULT  Goal: Hemodynamic stability and optimal renal function maintained  Description: INTERVENTIONS:  - Monitor labs and assess for signs and symptoms of volume excess or deficit  - Monitor intake, output and patient weight  - Monitor urine specific gravity, serum osmolarity and serum sodium as indicated or ordered  - Monitor response to interventions for patient's volume status, including labs, urine output, blood pressure (other measures as available)  - Encourage oral intake as appropriate  - Instruct patient on fluid and nutrition restrictions as appropriate  Outcome: Progressing     Problem: NEUROLOGICAL - ADULT  Goal: Achieves stable or improved neurological status  Description: INTERVENTIONS  - Assess for and report changes in neurological status  - Initiate measures to prevent increased intracranial pressure  - Maintain blood pressure and fluid volume within ordered parameters to optimize cerebral perfusion and minimize risk of hemorrhage  - Monitor temperature, glucose, and sodium. Initiate appropriate interventions as ordered  Outcome: Progressing     Problem: SAFETY ADULT - FALL  Goal: Free from fall injury  Description: INTERVENTIONS:  - Assess pt frequently for physical needs  - Identify cognitive and physical deficits and behaviors that affect risk of falls.  - [place] fall precautions as indicated by assessment.  - Educate pt/family on patient safety including physical limitations  - Instruct pt to call for assistance with activity based on assessment  - Modify environment to reduce risk of injury  - Provide assistive  devices as appropriate  - Consider OT/PT consult to assist with strengthening/mobility  - Encourage toileting schedule  Outcome: Progressing     Problem: DISCHARGE PLANNING  Goal: Discharge to home or other facility with appropriate resources  Description: INTERVENTIONS:  - Identify barriers to discharge w/pt and caregiver  - Include patient/family/discharge partner in discharge planning  - Arrange for needed discharge resources and transportation as appropriate  - Identify discharge learning needs (meds, wound care, etc)  - Arrange for interpreters to assist at discharge as needed  - Consider post-discharge preferences of patient/family/discharge partner  - Complete POLST form as appropriate  - Assess patient's ability to be responsible for managing their own health  - Refer to Case Management Department for coordinating discharge planning if the patient needs post-hospital services based on physician/LIP order or complex needs related to functional status, cognitive ability or social support system  Outcome: Progressing

## 2024-05-07 NOTE — DISCHARGE SUMMARY
[facility]     Tong León Patient Status:  Observation    1960 MRN [id number]   Location [facility] 3NE-A Attending Clay Willis MD   Hosp Day # 0 PCP Mesfin Millan MD     Date of Admission: 5/3/2024  Date of Discharge:   2024      Discharge Disposition: Home or Self Care    Discharge Diagnosis:  Light headedness  Polypharmacy  Anxiety  BPH  Carotid Stenosis     History of Present Illness: Tong León is a 64 year old male with a PMH of anxiety, depression, BPH, NIDIA, and HTN who presented to ED due to weakness/light-headedness/lethargy that developed over the past day. Patient was attempting to wean of norco with PCP and was started on lofexidine. Patient was feeling withdrawal symptoms with 1 tablet, called PCP, and took 3-4 more. Afterwards, patient began to feel unwell with symptoms noted above. Patient recently admitted for urinary retention. Currently denies N/V/D/CP/SOB.     Brief Synopsis: Pt was admitted and monitored on med/psych. His symptoms were likely due to polypharmacy. He was seen by psychiatry and his medications were adjsuted prior to DC.     Lace+ Score: 63  59-90 High Risk  29-58 Medium Risk  0-28   Low Risk       TCM Follow-Up Recommendation:  LACE > 58: High Risk of readmission after discharge from the hospital.      Procedures during hospitalization:   none    Incidental or significant findings and recommendations (brief descriptions):  none    Lab/Test results pending at Discharge:   none    Consultants:  Psych    Discharge Medication List:     Discharge Medications        CHANGE how you take these medications        Instructions Prescription details   sertraline 25 MG Tabs  Commonly known as: Zoloft  What changed:   medication strength  how much to take      Take 3 tablets (75 mg total) by mouth daily.   Stop taking on: 2024  Quantity: 90 tablet  Refills: 0            CONTINUE taking these medications        Instructions  Prescription details   clonazePAM 2 MG Tabs  Commonly known as: KlonoPIN      Take 2 tablets (4 mg total) by mouth at bedtime.   Refills: 0     ergocalciferol 1.25 MG (04194 UT) Caps  Commonly known as: ERGOCALCIFEROL      1 capsule (50,000 Units total). Takes once a week on Sunday   Refills: 0     folic acid 1 MG Tabs  Commonly known as: Folvite      Take 1 tablet (1 mg total) by mouth daily.   Refills: 0     HYDROcodone-acetaminophen 5-325 MG Tabs  Commonly known as: Norco      Take 1 tablet by mouth every 4 (four) hours as needed.   Quantity: 20 tablet  Refills: 0     losartan 50 MG Tabs  Commonly known as: Cozaar      Take 1 tablet (50 mg total) by mouth daily.   Refills: 0     nortriptyline 50 MG Caps  Commonly known as: Pamelor      Take 1 capsule (50 mg total) by mouth daily.   Refills: 0     rosuvastatin 20 MG Tabs  Commonly known as: Crestor      Take 1 tablet (20 mg total) by mouth nightly.   Refills: 0     tamsulosin 0.4 MG Caps  Commonly known as: Flomax      Take 1 capsule (0.4 mg total) by mouth daily.   Refills: 0     thiamine 100 MG Tabs      Take 1 tablet (100 mg total) by mouth daily.   Refills: 0            STOP taking these medications      Meloxicam 7.5 MG Tabs        phenazopyridine 200 MG Tabs  Commonly known as: Pyridum        Vraylar 1.5 MG Caps  Generic drug: Cariprazine HCl                  Where to Get Your Medications        These medications were sent to [id number] - [place], [place] - [address] [phone], [phone]  [address] [facility] [address]      Phone: [phone]   sertraline 25 MG Tabs         [place]    Follow-up appointment:   Mesfin Millan MD  [address] [facility] [facility] [address] [phone]    Schedule an appointment as soon as possible for a visit in 1 month(s)      Appointments for Next 30 Days 5/9/2024 - 6/8/2024        Date Arrival Time Visit Type Length Department Provider     6/5/2024  2:15 PM  [facility] LUMBAR WO [[id number]] 45 min  [facility] - [address] [facility] MR RM1 (1.5T)    Patient Instructions:     Please arrive 30 minutes prior to your scheduled appointment time.  You will need time to change your clothes, fill out screening forms, use the restroom, and may need an IV if your exam requires contrast.  If you arrive too late, your appointment may need to be rescheduled.    IF YOU REQUIRE ORAL SEDATION FOR YOUR MRI: Your physician is responsible for giving you a prescription for oral medication which you would fill at your local pharmacy. If you will be taking oral sedation, you must bring a  who will drive you home (the  does not necessarily have to stay throughout the procedure).        Location Instructions:     Your appointment will be at the [facility] located at the northwest corner of [address] and [address] located at [address] [place] [address].&nbsp; This facility is 2.5 miles south of [address] and 1 mile east of [address]. The telephone number for this location is [phone].  Due to safety reasons you will be required to change into a gown. You will be asked to remove all metallic items, such as watches, jewelry, hairpins, eyeglasses, hearing aids, and continuous glucose monitoring devices. Your purse, wallet or other personal items will remain in a secure locker or with your family during your exam.  Please bring your insurance card and photo ID. You will also need to bring your doctor's order unless your physician's office submitted the order electronically or faxed the order. Without the order, your test may be delayed or postponed.  Children: Children under the age of 12 must have another adult caregiver with them.&nbsp; Please do not bring your child/children without a caregiver.&nbsp; Because of the highly sensitive equipment and privacy of all our patients, children will not be permitted in the exam rooms, unless otherwise noted and in accordance with departmental policy.   PATIENT RESPONSIBILITY ESTIMATE  - (Estimate) We will provide you with your estimated remaining deductible and coinsurance balance for your services at the time of check in.  - (Payment) Please be aware that you may be asked for payment at the time of service.  Masks are optional for all patients and visitors, unless otherwise indicated.                      Vital signs:       Physical Exam:    General: No acute distress   Lungs: clear to auscultation  Cardiovascular: S1, S2  Abdomen: Soft      -----------------------------------------------------------------------------------------------  PATIENT DISCHARGE INSTRUCTIONS: See electronic chart    Clay Willis MD    Total time spent on discharge plannin minutes     The  [employer] Cures Act makes medical notes like these available to patients in the interest of transparency. Please be advised this is a medical document. Medical documents are intended to carry relevant information, facts as evident, and the clinical opinion of the practitioner. The medical note is intended as peer to peer communication and may appear blunt or direct. It is written in medical language and may contain abbreviations or verbiage that are unfamiliar.

## 2024-05-07 NOTE — PLAN OF CARE
Assumed pt care at 1930  Aox4, RA, VSS  Tele-NSR  Reported pain-administered PRN Norco  Reported no n/v/d  Lovenox DVT Prophylaxis  Regular diet  Noncardiac electrolyte replacement protocol  Continent  ambulatory  Safety precautions in place  Bed in lowest position and call light within reach  Updated with plan of care  All needs met at this time  Will continue plan of care          Problem: METABOLIC/FLUID AND ELECTROLYTES - ADULT  Goal: Hemodynamic stability and optimal renal function maintained  Description: INTERVENTIONS:  - Monitor labs and assess for signs and symptoms of volume excess or deficit  - Monitor intake, output and patient weight  - Monitor urine specific gravity, serum osmolarity and serum sodium as indicated or ordered  - Monitor response to interventions for patient's volume status, including labs, urine output, blood pressure (other measures as available)  - Encourage oral intake as appropriate  - Instruct patient on fluid and nutrition restrictions as appropriate  Outcome: Progressing     Problem: NEUROLOGICAL - ADULT  Goal: Achieves stable or improved neurological status  Description: INTERVENTIONS  - Assess for and report changes in neurological status  - Initiate measures to prevent increased intracranial pressure  - Maintain blood pressure and fluid volume within ordered parameters to optimize cerebral perfusion and minimize risk of hemorrhage  - Monitor temperature, glucose, and sodium. Initiate appropriate interventions as ordered  Outcome: Progressing     Problem: SAFETY ADULT - FALL  Goal: Free from fall injury  Description: INTERVENTIONS:  - Assess pt frequently for physical needs  - Identify cognitive and physical deficits and behaviors that affect risk of falls.  - [employer] fall precautions as indicated by assessment.  - Educate pt/family on patient safety including physical limitations  - Instruct pt to call for assistance with activity based on assessment  - Modify environment to  reduce risk of injury  - Provide assistive devices as appropriate  - Consider OT/PT consult to assist with strengthening/mobility  - Encourage toileting schedule  Outcome: Progressing     Problem: DISCHARGE PLANNING  Goal: Discharge to home or other facility with appropriate resources  Description: INTERVENTIONS:  - Identify barriers to discharge w/pt and caregiver  - Include patient/family/discharge partner in discharge planning  - Arrange for needed discharge resources and transportation as appropriate  - Identify discharge learning needs (meds, wound care, etc)  - Arrange for interpreters to assist at discharge as needed  - Consider post-discharge preferences of patient/family/discharge partner  - Complete POLST form as appropriate  - Assess patient's ability to be responsible for managing their own health  - Refer to Case Management Department for coordinating discharge planning if the patient needs post-hospital services based on physician/LIP order or complex needs related to functional status, cognitive ability or social support system  Outcome: Progressing

## 2024-05-07 NOTE — PROGRESS NOTES
[facility]  Report of Psychiatric Progress    Tong León Patient Status:  Observation    1960 MRN [id number]   Location [facility] 3NE-A Attending Clay Willis MD   Hosp Day # 0 PCP Mesfin Millan MD     Date of Admission: 5/3/2024  Date of Service: 2024    Reason for Consultation:   Unintentional overdose on Klonopin, Norco, and Lucemyra with alcohol.    Impression:    Primary Psychiatric Diagnosis:  Anxiety disorder with panic attacks  -chronic history, has been taking 4mg of Klonopin nightly to assist with anxiety/panic and sleep     Secondary Psychiatric Diagnoses:  Depression, unspecified    History of alcohol use disorder  Alcohol use, uncomplicated     Rule Out Diagnoses:  Benzodiazepine use disorder  Overuse of opioids?  Alcohol use disorder    Personality Traits:  Deferred     Pertinent Medical Diagnoses:  Benign prostatic hypertrophy  Hypertension  Carotid stenosis  Reported history of low back pain, degenerative disc disease    Recommendations:  1) Continue Zoloft to 75mg by mouth daily, for anxiety and depression.  -Script has been sent to pharmacy    2) Discontinue Vraylar.  Patient recently began taking this medication and has not realized any efficacy.  Further, it is not covered by insurance nor is there an affordable generic thus patient unsure if he is able to continue taking this medication.  -Patient could consult with his provider regarding Abilify as a suitable and affordable alternative.    3) Continue Klonopin 4mg by mouth, nightly for panic and sleep.  -EXTENSIVE education provided regarding the danger of co-administration of Klonopin with Norco (or other opiates), including but not limited to CNS depression that could lead to death.  Furthermore, educated patient of the risks of drinking alcohol with these medications.  -Patient indicates he would like to taper his Klonopin to a more reasonable dose with guidance of his outpatient provider.    4) No need for  Lucemyra.  Patient is not currently withdrawing from opiates.  -If patient is taking more opiates than he is reporting and begins to experience withdrawal symptoms, buprenorphine would be a better treatment approach.    5) Patient would benefit from connecting with a psychiatrist for management of psychiatric medications.  He would also benefit from connecting with a therapist for better control and management of his anxiety/panic, particularly if he is having to use Klonopin and alcohol to palliate symptoms.  Referrals to be placed in the discharge summary.    6) Patient is clear from the psychiatric perspective and may discharge.      Marge Bower PA-C, CAQ-Psychiatry        History of Present Illness:  64-year-old male with history of anxiety, panic, depression and PTSD as well as history of alcohol use and possible overuse of benzodiazepines and opiates is seen for consult.    Psychiatric history is remarkable for 1 inpatient hospitalization in 2018 for \"taking more Klonopin than prescribed\".  PCP manages his psychiatric medications, which include Zoloft, Lyrica, nortriptyline, Klonopin, and Vraylar.    Patient came to the ED 3 days ago for possible withdrawal from hydrocodone, as well as weak, light-headed, and lethargic.  He had been attempting to wean from Norco at the direction of his PCP who felt he was experiencing withdrawal, thus Lucemyra was started.  Patient took 1 tablet but did not experience any relief from symptoms so he took 3-4 more.  He also co-ingested with Klonopin and drank alcohol.    He tells me this was an unintentional overdose.  He is not suicidal and does not want to die.  He admits to feeling anxious and has a history of panic attacks.  He tells me he has been taking Klonopin 4mg at bedtime to assist with sleep.    There is a history of substance use/abuse.  Patient tells me that 20 years ago he was on Norco for 2 years straight due to DJD and chronic pain.  He was able to  wean himself at that time.  He then states he was started back on the Norco with short supply about 1.5 years ago with more frequent use within the past 6 months.  He also has a history of heavy alcohol use beginning on 2001.  He had maintained 3 years of recovery until just recently.  He alludes that he has a history of taking more Klonopin by telling me he \"has run out early\".    Interval History  5/7/2024 - Patient indicates a desire to discharge today.  No suicidal thoughts, slept well last night, anxiety present but manageable.    Past Psychiatric History:  In 2018, patient was hospitalized for 6-8 days at [facility] for \"overtaking my Klonopin\".  He notes that his FOID card was revoked and he lost access to his guns.  No history of day program or rehab (detox or residential).  His PCP manages his psychiatric medications.  He tells me he saw a psychiatrist once but \"I made a mistake\".  He participated in therapy at the age of 34 for about 1.5 years.    Substance Use History:  Patient is vague about details.  History of heavy alcohol use.  In recovery for 3 years but drank just prior to this admissions.  History of higher-dose benzodiazepine use, takes 4mg at night.  Admits to having times when he runs out early due to taking more than prescribed.    Psych Family History:  Denies    Social and Developmental History:   Lives in [place] with his dog.  He is .  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    Past Medical History:    Anxiety    Back problem    Calculus of kidney    Coronary atherosclerosis    Depression    Enlarged prostate    High blood pressure    High cholesterol    NIDIA (obstructive sleep apnea)    Other and unspecified hyperlipidemia    Sleep apnea    Unspecified essential hypertension    Urinary retention    Visual impairment     Past Surgical History:   Procedure Laterality Date    Cath bare metal stent (bms)      Removal of  lung,lobectomy      Stent placemt retro carotid Right      Family History   Problem Relation Age of Onset    Heart Disorder Mother     Diabetes Paternal Grandmother       reports that he has quit smoking. He has never used smokeless tobacco. He reports that he does not currently use alcohol. He reports that he does not use drugs.    Allergies:  No Known Allergies    Medications:    Current Facility-Administered Medications:     sertraline (Zoloft) tab 75 mg, 75 mg, Oral, Daily    losartan (Cozaar) tab 50 mg, 50 mg, Oral, Daily    folic acid (Folvite) tab 1 mg, 1 mg, Oral, Daily    HYDROcodone-acetaminophen (Norco) 5-325 MG per tab 1 tablet, 1 tablet, Oral, Q4H PRN    nortriptyline (Pamelor) cap 50 mg, 50 mg, Oral, Daily    phenazopyridine (Pyridum) tab 200 mg, 200 mg, Oral, TID PRN    tamsulosin (Flomax) cap 0.4 mg, 0.4 mg, Oral, Daily    thiamine (Vitamin B1) tab 100 mg, 100 mg, Oral, Daily    melatonin tab 3 mg, 3 mg, Oral, Nightly PRN    enoxaparin (Lovenox) 40 MG/0.4ML SUBQ injection 40 mg, 40 mg, Subcutaneous, Daily    acetaminophen (Tylenol Extra Strength) tab 500 mg, 500 mg, Oral, Q4H PRN    polyethylene glycol (PEG 3350) (Miralax) 17 g oral packet 17 g, 17 g, Oral, Daily PRN    sennosides (Senokot) tab 17.2 mg, 17.2 mg, Oral, Nightly PRN    bisacodyl (Dulcolax) 10 MG rectal suppository 10 mg, 10 mg, Rectal, Daily PRN    fleet enema (Fleet) 7-19 GM/118ML rectal enema 133 mL, 1 enema, Rectal, Once PRN    ondansetron (Zofran) 4 MG/2ML injection 4 mg, 4 mg, Intravenous, Q6H PRN    prochlorperazine (Compazine) 10 MG/2ML injection 5 mg, 5 mg, Intravenous, Q8H PRN    clonazePAM (KlonoPIN) tab 4 mg, 4 mg, Oral, Nightly    rosuvastatin (Crestor) tab 20 mg, 20 mg, Oral, Nightly    clonazePAM (KlonoPIN) tab 0.5 mg, 0.5 mg, Oral, BID PRN    fluticasone propionate (Flonase) 50 MCG/ACT nasal suspension 1 spray, 1 spray, Each Nare, Daily    Review of Systems   Psychiatric/Behavioral:  Positive for substance abuse. Negative  for depression, hallucinations and suicidal ideas. The patient is nervous/anxious and has insomnia.    All other systems reviewed and are negative.        Mental Status Exam:     Risk Assessment  Suicidal ideation: no suicidal ideation  Homicidal ideation: None noted    Objective      Vitals:    05/07/24 1155   BP: 151/78   Pulse: 87   Resp: 18   Temp: 97.9 °F (36.6 °C)     Appearance: unremarkable and normal grooming  Behavior: unremarkable  Attitude: cooperative and pleasant  Gait: did not observe, patient in cart    Speech: normal rate, rhythm and volume    Mood:  mildly anxious  Affect: Congruent    Thought process: tangential  Thought content: no delusions, obsessions, or other thought abnormalities  Perceptions: no hallucinations  Associations: Intact    Orientation: Alert and Oriented person, place, time, situation  Attention and Concentration:   fair to good  Memory:  intact as evidenced by ability to give a detailed history  Language: English, normal  Fund of Knowledge:  average as evidenced by language usage and ability to communicate with provider  Insight: Fair but impaired   Judgment: Fair but impaired     Laboratory Data:       STUDIES:

## 2024-05-07 NOTE — PROGRESS NOTES
NURSING DISCHARGE NOTE    Discharged Home via Wheelchair.  Accompanied by Support staff  Belongings Taken by patient/family.    AVS printed and reviewed with patient  PIV removed  Patient discharged in stable condition, called a cab from south entrance to discharge home.

## 2024-05-11 PROBLEM — F41.1 GENERALIZED ANXIETY DISORDER: Status: ACTIVE | Noted: 2024-05-07

## 2024-05-11 PROBLEM — T50.905A ACUTE MEDICATION-INDUCED AKATHISIA: Status: ACTIVE | Noted: 2024-05-11

## 2024-05-11 PROBLEM — G25.71 ACUTE MEDICATION-INDUCED AKATHISIA: Status: ACTIVE | Noted: 2024-05-11

## 2024-05-12 ENCOUNTER — HOSPITAL ENCOUNTER (OUTPATIENT)
Age: 64
Setting detail: OBSERVATION
Discharge: HOME | End: 2024-05-13
Attending: STUDENT IN AN ORGANIZED HEALTH CARE EDUCATION/TRAINING PROGRAM | Admitting: STUDENT IN AN ORGANIZED HEALTH CARE EDUCATION/TRAINING PROGRAM

## 2024-05-12 ENCOUNTER — APPOINTMENT (OUTPATIENT)
Dept: CT IMAGING | Age: 64
End: 2024-05-12
Attending: PSYCHIATRY & NEUROLOGY

## 2024-05-12 DIAGNOSIS — T50.905A ADVERSE EFFECT OF DRUG, INITIAL ENCOUNTER: Primary | ICD-10-CM

## 2024-05-12 LAB
ALBUMIN SERPL-MCNC: 4 G/DL (ref 3.6–5.1)
ALBUMIN/GLOB SERPL: 1.3 {RATIO} (ref 1–2.4)
ALP SERPL-CCNC: 68 UNITS/L (ref 45–117)
ALT SERPL-CCNC: 90 UNITS/L
ANION GAP SERPL CALC-SCNC: 9 MMOL/L (ref 7–19)
AST SERPL-CCNC: 45 UNITS/L
BASOPHILS # BLD: 0 K/MCL (ref 0–0.3)
BASOPHILS NFR BLD: 0 %
BILIRUB SERPL-MCNC: 0.5 MG/DL (ref 0.2–1)
BUN SERPL-MCNC: 28 MG/DL (ref 6–20)
BUN/CREAT SERPL: 31 (ref 7–25)
CALCIUM SERPL-MCNC: 9.1 MG/DL (ref 8.4–10.2)
CHLORIDE SERPL-SCNC: 107 MMOL/L (ref 97–110)
CO2 SERPL-SCNC: 28 MMOL/L (ref 21–32)
CREAT SERPL-MCNC: 0.89 MG/DL (ref 0.67–1.17)
DEPRECATED RDW RBC: 49.8 FL (ref 39–50)
EGFRCR SERPLBLD CKD-EPI 2021: >90 ML/MIN/{1.73_M2}
EOSINOPHIL # BLD: 0.1 K/MCL (ref 0–0.5)
EOSINOPHIL NFR BLD: 1 %
ERYTHROCYTE [DISTWIDTH] IN BLOOD: 14.2 % (ref 11–15)
ERYTHROCYTE [SEDIMENTATION RATE] IN BLOOD BY WESTERGREN METHOD: 5 MM/HR (ref 0–20)
FASTING DURATION TIME PATIENT: ABNORMAL H
GLOBULIN SER-MCNC: 3 G/DL (ref 2–4)
GLUCOSE SERPL-MCNC: 92 MG/DL (ref 70–99)
HCT VFR BLD CALC: 47.1 % (ref 39–51)
HGB BLD-MCNC: 15 G/DL (ref 13–17)
IMM GRANULOCYTES # BLD AUTO: 0 K/MCL (ref 0–0.2)
IMM GRANULOCYTES # BLD: 0 %
LYMPHOCYTES # BLD: 1.8 K/MCL (ref 1–4)
LYMPHOCYTES NFR BLD: 25 %
MCH RBC QN AUTO: 30.2 PG (ref 26–34)
MCHC RBC AUTO-ENTMCNC: 31.8 G/DL (ref 32–36.5)
MCV RBC AUTO: 94.8 FL (ref 78–100)
MONOCYTES # BLD: 0.6 K/MCL (ref 0.3–0.9)
MONOCYTES NFR BLD: 8 %
NEUTROPHILS # BLD: 4.8 K/MCL (ref 1.8–7.7)
NEUTROPHILS NFR BLD: 66 %
NRBC BLD MANUAL-RTO: 0 /100 WBC
PLATELET # BLD AUTO: 192 K/MCL (ref 140–450)
POTASSIUM SERPL-SCNC: 4.3 MMOL/L (ref 3.4–5.1)
PROT SERPL-MCNC: 7 G/DL (ref 6.4–8.2)
RAINBOW EXTRA TUBES HOLD SPECIMEN: NORMAL
RBC # BLD: 4.97 MIL/MCL (ref 4.5–5.9)
SODIUM SERPL-SCNC: 140 MMOL/L (ref 135–145)
WBC # BLD: 7.3 K/MCL (ref 4.2–11)

## 2024-05-12 PROCEDURE — 99284 EMERGENCY DEPT VISIT MOD MDM: CPT

## 2024-05-12 PROCEDURE — G0378 HOSPITAL OBSERVATION PER HR: HCPCS

## 2024-05-12 PROCEDURE — 10002800 HB RX 250 W HCPCS: Performed by: STUDENT IN AN ORGANIZED HEALTH CARE EDUCATION/TRAINING PROGRAM

## 2024-05-12 PROCEDURE — 10004651 HB RX, NO CHARGE ITEM: Performed by: STUDENT IN AN ORGANIZED HEALTH CARE EDUCATION/TRAINING PROGRAM

## 2024-05-12 PROCEDURE — 36415 COLL VENOUS BLD VENIPUNCTURE: CPT | Performed by: PSYCHIATRY & NEUROLOGY

## 2024-05-12 PROCEDURE — 85025 COMPLETE CBC W/AUTO DIFF WBC: CPT | Performed by: STUDENT IN AN ORGANIZED HEALTH CARE EDUCATION/TRAINING PROGRAM

## 2024-05-12 PROCEDURE — 96375 TX/PRO/DX INJ NEW DRUG ADDON: CPT

## 2024-05-12 PROCEDURE — 10002807 HB RX 258: Performed by: STUDENT IN AN ORGANIZED HEALTH CARE EDUCATION/TRAINING PROGRAM

## 2024-05-12 PROCEDURE — 80053 COMPREHEN METABOLIC PANEL: CPT | Performed by: STUDENT IN AN ORGANIZED HEALTH CARE EDUCATION/TRAINING PROGRAM

## 2024-05-12 PROCEDURE — 10002803 HB RX 637: Performed by: STUDENT IN AN ORGANIZED HEALTH CARE EDUCATION/TRAINING PROGRAM

## 2024-05-12 PROCEDURE — 85652 RBC SED RATE AUTOMATED: CPT | Performed by: PSYCHIATRY & NEUROLOGY

## 2024-05-12 PROCEDURE — 96361 HYDRATE IV INFUSION ADD-ON: CPT

## 2024-05-12 PROCEDURE — 70450 CT HEAD/BRAIN W/O DYE: CPT

## 2024-05-12 PROCEDURE — 96374 THER/PROPH/DIAG INJ IV PUSH: CPT

## 2024-05-12 RX ORDER — KETOROLAC TROMETHAMINE 15 MG/ML
15 INJECTION, SOLUTION INTRAMUSCULAR; INTRAVENOUS ONCE
Status: COMPLETED | OUTPATIENT
Start: 2024-05-12 | End: 2024-05-12

## 2024-05-12 RX ORDER — DIPHENHYDRAMINE HYDROCHLORIDE 50 MG/ML
25 INJECTION INTRAMUSCULAR; INTRAVENOUS ONCE
Status: COMPLETED | OUTPATIENT
Start: 2024-05-12 | End: 2024-05-12

## 2024-05-12 RX ORDER — CYCLOBENZAPRINE HCL 10 MG
10 TABLET ORAL 3 TIMES DAILY PRN
Status: DISCONTINUED | OUTPATIENT
Start: 2024-05-12 | End: 2024-05-13 | Stop reason: HOSPADM

## 2024-05-12 RX ORDER — SERTRALINE HCL 100 MG
50 TABLET ORAL DAILY
Status: ON HOLD | COMMUNITY
End: 2024-05-13

## 2024-05-12 RX ORDER — CLONAZEPAM 0.5 MG/1
0.5 TABLET ORAL EVERY 12 HOURS SCHEDULED
Status: DISCONTINUED | OUTPATIENT
Start: 2024-05-12 | End: 2024-05-12

## 2024-05-12 RX ORDER — KETOROLAC TROMETHAMINE 15 MG/ML
15 INJECTION, SOLUTION INTRAMUSCULAR; INTRAVENOUS EVERY 6 HOURS PRN
Status: DISCONTINUED | OUTPATIENT
Start: 2024-05-12 | End: 2024-05-13 | Stop reason: HOSPADM

## 2024-05-12 RX ORDER — SERTRALINE HYDROCHLORIDE 25 MG/1
25 TABLET, FILM COATED ORAL DAILY
Status: DISCONTINUED | OUTPATIENT
Start: 2024-05-12 | End: 2024-05-12

## 2024-05-12 RX ORDER — POLYETHYLENE GLYCOL 3350 17 G/17G
17 POWDER, FOR SOLUTION ORAL DAILY PRN
Status: DISCONTINUED | OUTPATIENT
Start: 2024-05-12 | End: 2024-05-13 | Stop reason: HOSPADM

## 2024-05-12 RX ORDER — ENOXAPARIN SODIUM 100 MG/ML
40 INJECTION SUBCUTANEOUS DAILY
Status: DISCONTINUED | OUTPATIENT
Start: 2024-05-12 | End: 2024-05-13 | Stop reason: HOSPADM

## 2024-05-12 RX ORDER — SODIUM CHLORIDE 9 MG/ML
INJECTION, SOLUTION INTRAVENOUS CONTINUOUS
Status: DISCONTINUED | OUTPATIENT
Start: 2024-05-12 | End: 2024-05-13

## 2024-05-12 RX ORDER — TAMSULOSIN HYDROCHLORIDE 0.4 MG/1
0.4 CAPSULE ORAL
Status: ON HOLD | COMMUNITY

## 2024-05-12 RX ORDER — CLONAZEPAM 1 MG/1
2 TABLET ORAL 2 TIMES DAILY PRN
Status: DISCONTINUED | OUTPATIENT
Start: 2024-05-12 | End: 2024-05-13

## 2024-05-12 RX ORDER — QUETIAPINE FUMARATE 100 MG/1
100 TABLET, FILM COATED ORAL NIGHTLY
Status: DISCONTINUED | OUTPATIENT
Start: 2024-05-12 | End: 2024-05-12

## 2024-05-12 RX ORDER — BUTALBITAL, ACETAMINOPHEN AND CAFFEINE 50; 325; 40 MG/1; MG/1; MG/1
1 TABLET ORAL EVERY 8 HOURS PRN
Status: DISCONTINUED | OUTPATIENT
Start: 2024-05-12 | End: 2024-05-13 | Stop reason: HOSPADM

## 2024-05-12 RX ORDER — ONDANSETRON 2 MG/ML
4 INJECTION INTRAMUSCULAR; INTRAVENOUS EVERY 12 HOURS PRN
Status: DISCONTINUED | OUTPATIENT
Start: 2024-05-12 | End: 2024-05-13 | Stop reason: HOSPADM

## 2024-05-12 RX ORDER — 0.9 % SODIUM CHLORIDE 0.9 %
2 VIAL (ML) INJECTION EVERY 12 HOURS SCHEDULED
Status: DISCONTINUED | OUTPATIENT
Start: 2024-05-12 | End: 2024-05-13 | Stop reason: HOSPADM

## 2024-05-12 RX ORDER — HYDRALAZINE HYDROCHLORIDE 20 MG/ML
10 INJECTION INTRAMUSCULAR; INTRAVENOUS EVERY 4 HOURS PRN
Status: DISCONTINUED | OUTPATIENT
Start: 2024-05-12 | End: 2024-05-13 | Stop reason: HOSPADM

## 2024-05-12 RX ORDER — BISACODYL 10 MG
10 SUPPOSITORY, RECTAL RECTAL DAILY PRN
Status: DISCONTINUED | OUTPATIENT
Start: 2024-05-12 | End: 2024-05-13 | Stop reason: HOSPADM

## 2024-05-12 RX ORDER — ERGOCALCIFEROL 1.25 MG/1
1.25 CAPSULE ORAL
Status: ON HOLD | COMMUNITY

## 2024-05-12 RX ORDER — QUETIAPINE FUMARATE 100 MG/1
TABLET, FILM COATED ORAL EVERY 24 HOURS
Status: ON HOLD | COMMUNITY
End: 2024-05-13 | Stop reason: HOSPADM

## 2024-05-12 RX ORDER — ONDANSETRON 4 MG/1
4 TABLET, ORALLY DISINTEGRATING ORAL EVERY 12 HOURS PRN
Status: DISCONTINUED | OUTPATIENT
Start: 2024-05-12 | End: 2024-05-13 | Stop reason: HOSPADM

## 2024-05-12 RX ORDER — HYDROCODONE BITARTRATE AND ACETAMINOPHEN 5; 325 MG/1; MG/1
1 TABLET ORAL EVERY 6 HOURS PRN
Status: ON HOLD | COMMUNITY
End: 2024-05-13 | Stop reason: HOSPADM

## 2024-05-12 RX ORDER — AMOXICILLIN 250 MG
2 CAPSULE ORAL 2 TIMES DAILY PRN
Status: DISCONTINUED | OUTPATIENT
Start: 2024-05-12 | End: 2024-05-13 | Stop reason: HOSPADM

## 2024-05-12 RX ORDER — MAGNESIUM HYDROXIDE/ALUMINUM HYDROXICE/SIMETHICONE 120; 1200; 1200 MG/30ML; MG/30ML; MG/30ML
30 SUSPENSION ORAL EVERY 4 HOURS PRN
Status: DISCONTINUED | OUTPATIENT
Start: 2024-05-12 | End: 2024-05-13 | Stop reason: HOSPADM

## 2024-05-12 RX ORDER — HYDROXYZINE HYDROCHLORIDE 25 MG/1
50 TABLET, FILM COATED ORAL EVERY 8 HOURS PRN
Status: DISCONTINUED | OUTPATIENT
Start: 2024-05-12 | End: 2024-05-13 | Stop reason: HOSPADM

## 2024-05-12 RX ORDER — DIPHENHYDRAMINE HYDROCHLORIDE 50 MG/ML
25 INJECTION INTRAMUSCULAR; INTRAVENOUS ONCE
Status: DISCONTINUED | OUTPATIENT
Start: 2024-05-12 | End: 2024-05-13 | Stop reason: HOSPADM

## 2024-05-12 RX ORDER — QUETIAPINE FUMARATE 25 MG/1
50 TABLET, FILM COATED ORAL EVERY 12 HOURS SCHEDULED
Status: DISCONTINUED | OUTPATIENT
Start: 2024-05-12 | End: 2024-05-12

## 2024-05-12 RX ORDER — METOCLOPRAMIDE HYDROCHLORIDE 5 MG/ML
10 INJECTION INTRAMUSCULAR; INTRAVENOUS ONCE
Status: COMPLETED | OUTPATIENT
Start: 2024-05-12 | End: 2024-05-12

## 2024-05-12 RX ADMIN — METOCLOPRAMIDE HYDROCHLORIDE 10 MG: 5 INJECTION INTRAMUSCULAR; INTRAVENOUS at 12:19

## 2024-05-12 RX ADMIN — SERTRALINE HYDROCHLORIDE 50 MG: 50 TABLET, FILM COATED ORAL at 17:04

## 2024-05-12 RX ADMIN — SODIUM CHLORIDE 1000 ML: 0.9 INJECTION, SOLUTION INTRAVENOUS at 12:18

## 2024-05-12 RX ADMIN — SODIUM CHLORIDE, PRESERVATIVE FREE 2 ML: 5 INJECTION INTRAVENOUS at 20:23

## 2024-05-12 RX ADMIN — HYDROXYZINE HYDROCHLORIDE 50 MG: 25 TABLET ORAL at 22:25

## 2024-05-12 RX ADMIN — CLONAZEPAM 2 MG: 1 TABLET ORAL at 23:39

## 2024-05-12 RX ADMIN — SODIUM CHLORIDE: 9 INJECTION, SOLUTION INTRAVENOUS at 17:05

## 2024-05-12 RX ADMIN — DIPHENHYDRAMINE HYDROCHLORIDE 25 MG: 50 INJECTION, SOLUTION INTRAMUSCULAR; INTRAVENOUS at 12:19

## 2024-05-12 RX ADMIN — ONDANSETRON 4 MG: 2 INJECTION INTRAMUSCULAR; INTRAVENOUS at 18:16

## 2024-05-12 RX ADMIN — CLONAZEPAM 2 MG: 1 TABLET ORAL at 15:33

## 2024-05-12 RX ADMIN — CYCLOBENZAPRINE HYDROCHLORIDE 10 MG: 10 TABLET, FILM COATED ORAL at 17:04

## 2024-05-12 RX ADMIN — KETOROLAC TROMETHAMINE 15 MG: 15 INJECTION, SOLUTION INTRAMUSCULAR; INTRAVENOUS at 12:19

## 2024-05-12 SDOH — SOCIAL STABILITY: SOCIAL INSECURITY: HOW OFTEN DOES ANYONE, INCLUDING FAMILY AND FRIENDS, THREATEN YOU WITH HARM?: NEVER

## 2024-05-12 SDOH — ECONOMIC STABILITY: HOUSING INSECURITY: WHAT IS YOUR LIVING SITUATION TODAY?: HOUSE

## 2024-05-12 SDOH — HEALTH STABILITY: PHYSICAL HEALTH: DO YOU HAVE SERIOUS DIFFICULTY WALKING OR CLIMBING STAIRS?: NO

## 2024-05-12 SDOH — HEALTH STABILITY: GENERAL: BECAUSE OF A PHYSICAL, MENTAL, OR EMOTIONAL CONDITION, DO YOU HAVE DIFFICULTY DOING ERRANDS ALONE?: NO

## 2024-05-12 SDOH — ECONOMIC STABILITY: GENERAL: WOULD YOU LIKE HELP WITH ANY OF THE FOLLOWING NEEDS?: I DON'T WANT HELP WITH ANY OF THESE

## 2024-05-12 SDOH — ECONOMIC STABILITY: FOOD INSECURITY: WITHIN THE PAST 12 MONTHS, THE FOOD YOU BOUGHT JUST DIDN'T LAST AND YOU DIDN'T HAVE MONEY TO GET MORE.: NEVER TRUE

## 2024-05-12 SDOH — ECONOMIC STABILITY: HOUSING INSECURITY: DO YOU HAVE PROBLEMS WITH ANY OF THE FOLLOWING?: NONE OF THE ABOVE

## 2024-05-12 SDOH — SOCIAL STABILITY: SOCIAL INSECURITY: HOW OFTEN DOES ANYONE, INCLUDING FAMILY AND FRIENDS, INSULT OR TALK DOWN TO YOU?: NEVER

## 2024-05-12 SDOH — SOCIAL STABILITY: SOCIAL INSECURITY: HOW OFTEN DOES ANYONE, INCLUDING FAMILY AND FRIENDS, PHYSICALLY HURT YOU?: NEVER

## 2024-05-12 SDOH — HEALTH STABILITY: GENERAL
BECAUSE OF A PHYSICAL, MENTAL, OR EMOTIONAL CONDITION, DO YOU HAVE SERIOUS DIFFICULTY CONCENTRATING, REMEMBERING OR MAKING DECISIONS?: YES

## 2024-05-12 SDOH — ECONOMIC STABILITY: HOUSING INSECURITY: WHAT IS YOUR LIVING SITUATION TODAY?: ALONE

## 2024-05-12 SDOH — HEALTH STABILITY: PHYSICAL HEALTH: DO YOU HAVE DIFFICULTY DRESSING OR BATHING?: NO

## 2024-05-12 SDOH — ECONOMIC STABILITY: INCOME INSECURITY: IN THE PAST 12 MONTHS, HAS THE ELECTRIC, GAS, OIL, OR WATER COMPANY THREATENED TO SHUT OFF SERVICE IN YOUR HOME?: NO

## 2024-05-12 SDOH — SOCIAL STABILITY: SOCIAL NETWORK: SUPPORT SYSTEMS: FRIENDS

## 2024-05-12 SDOH — ECONOMIC STABILITY: GENERAL
IN THE PAST YEAR, HAVE YOU OR ANY FAMILY MEMBERS YOU LIVE WITH BEEN UNABLE TO GET ANY OF THE FOLLOWING WHEN IT WAS REALLY NEEDED? CHECK ALL THAT APPLY.: MEDICINE OR ANY HEALTH CARE (MEDICAL, DENTAL, MENTAL HEALTH, VISION)

## 2024-05-12 SDOH — SOCIAL STABILITY: SOCIAL INSECURITY: HOW OFTEN DOES ANYONE, INCLUDING FAMILY AND FRIENDS, SCREAM OR CURSE AT YOU?: NEVER

## 2024-05-12 SDOH — ECONOMIC STABILITY: HOUSING INSECURITY: WHAT IS YOUR LIVING SITUATION TODAY?: I HAVE A STEADY PLACE TO LIVE

## 2024-05-12 SDOH — ECONOMIC STABILITY: TRANSPORTATION INSECURITY
IN THE PAST 12 MONTHS, HAS LACK OF RELIABLE TRANSPORTATION KEPT YOU FROM MEDICAL APPOINTMENTS, MEETINGS, WORK OR FROM GETTING THINGS NEEDED FOR DAILY LIVING?: NO

## 2024-05-12 ASSESSMENT — ENCOUNTER SYMPTOMS
NERVOUS/ANXIOUS: 1
HEADACHES: 1
DIZZINESS: 1
SLEEP DISTURBANCE: 1

## 2024-05-12 ASSESSMENT — PATIENT HEALTH QUESTIONNAIRE - PHQ9
CLINICAL INTERPRETATION OF PHQ2 SCORE: FURTHER SCREENING NEEDED
7. TROUBLE CONCENTRATING ON THINGS, SUCH AS READING THE NEWSPAPER OR WATCHING TELEVISION: NOT AT ALL
2. FEELING DOWN, DEPRESSED OR HOPELESS: NEARLY EVERY DAY
SUM OF ALL RESPONSES TO PHQ QUESTIONS 1-9: 15
10. IF YOU CHECKED OFF ANY PROBLEMS, HOW DIFFICULT HAVE THESE PROBLEMS MADE IT FOR YOU TO DO YOUR WORK, TAKE CARE OF THINGS AT HOME, OR GET ALONG WITH OTHER PEOPLE: SOMEWHAT DIFFICULT
SUM OF ALL RESPONSES TO PHQ9 QUESTIONS 1 AND 2: 6
SUM OF ALL RESPONSES TO PHQ9 QUESTIONS 1 AND 2: 6
CLINICAL INTERPRETATION OF PHQ9 SCORE: MODERATELY SEVERE DEPRESSION
1. LITTLE INTEREST OR PLEASURE IN DOING THINGS: NEARLY EVERY DAY
IS PATIENT ABLE TO COMPLETE PHQ2 OR PHQ9: YES
3. TROUBLE FALLING OR STAYING ASLEEP OR SLEEPING TOO MUCH: NEARLY EVERY DAY
9. THOUGHTS THAT YOU WOULD BE BETTER OFF DEAD, OR OF HURTING YOURSELF: NOT AT ALL
4. FEELING TIRED OR HAVING LITTLE ENERGY: NEARLY EVERY DAY
8. MOVING OR SPEAKING SO SLOWLY THAT OTHER PEOPLE COULD HAVE NOTICED. OR THE OPPOSITE, BEING SO FIGETY OR RESTLESS THAT YOU HAVE BEEN MOVING AROUND A LOT MORE THAN USUAL: NEARLY EVERY DAY
6. FEELING BAD ABOUT YOURSELF - OR THAT YOU ARE A FAILURE OR HAVE LET YOURSELF OR YOUR FAMILY DOWN: NOT AT ALL
5. POOR APPETITE OR OVEREATING: NOT AT ALL

## 2024-05-12 ASSESSMENT — PAIN DESCRIPTION - PAIN TYPE: TYPE: ACUTE PAIN

## 2024-05-12 ASSESSMENT — ACTIVITIES OF DAILY LIVING (ADL)
ADL_SCORE: 12
ADL_SHORT_OF_BREATH: YES
RECENT_DECLINE_ADL: YES, ACUTE ILLNESS WITHOUT THERAPY NEEDS
ADL_BEFORE_ADMISSION: INDEPENDENT

## 2024-05-12 ASSESSMENT — LIFESTYLE VARIABLES
AUDIT-C TOTAL SCORE: 0
HOW OFTEN DO YOU HAVE 6 OR MORE DRINKS ON ONE OCCASION: NEVER
HOW OFTEN DO YOU HAVE A DRINK CONTAINING ALCOHOL: NEVER
ALCOHOL_USE_STATUS: NO OR LOW RISK WITH VALIDATED TOOL
HOW MANY STANDARD DRINKS CONTAINING ALCOHOL DO YOU HAVE ON A TYPICAL DAY: 0,1 OR 2

## 2024-05-12 ASSESSMENT — PAIN SCALES - GENERAL
PAINLEVEL_OUTOF10: 0
PAINLEVEL_OUTOF10: 0
PAINLEVEL_OUTOF10: 5
PAINLEVEL_OUTOF10: 4

## 2024-05-12 ASSESSMENT — COLUMBIA-SUICIDE SEVERITY RATING SCALE - C-SSRS
6. HAVE YOU EVER DONE ANYTHING, STARTED TO DO ANYTHING, OR PREPARED TO DO ANYTHING TO END YOUR LIFE?: NO
2. HAVE YOU ACTUALLY HAD ANY THOUGHTS OF KILLING YOURSELF?: NO
1. WITHIN THE PAST MONTH, HAVE YOU WISHED YOU WERE DEAD OR WISHED YOU COULD GO TO SLEEP AND NOT WAKE UP?: NO

## 2024-05-13 VITALS
RESPIRATION RATE: 19 BRPM | OXYGEN SATURATION: 93 % | HEART RATE: 96 BPM | BODY MASS INDEX: 35.19 KG/M2 | SYSTOLIC BLOOD PRESSURE: 128 MMHG | TEMPERATURE: 98.1 F | WEIGHT: 245.81 LBS | DIASTOLIC BLOOD PRESSURE: 83 MMHG | HEIGHT: 70 IN

## 2024-05-13 LAB
ALBUMIN SERPL-MCNC: 3.4 G/DL (ref 3.6–5.1)
ALBUMIN/GLOB SERPL: 1.3 {RATIO} (ref 1–2.4)
ALP SERPL-CCNC: 59 UNITS/L (ref 45–117)
ALT SERPL-CCNC: 71 UNITS/L
ANION GAP SERPL CALC-SCNC: 8 MMOL/L (ref 7–19)
AST SERPL-CCNC: 43 UNITS/L
BASOPHILS # BLD: 0 K/MCL (ref 0–0.3)
BASOPHILS NFR BLD: 0 %
BILIRUB SERPL-MCNC: 0.4 MG/DL (ref 0.2–1)
BUN SERPL-MCNC: 12 MG/DL (ref 6–20)
BUN/CREAT SERPL: 15 (ref 7–25)
CALCIUM SERPL-MCNC: 8.7 MG/DL (ref 8.4–10.2)
CHLORIDE SERPL-SCNC: 111 MMOL/L (ref 97–110)
CO2 SERPL-SCNC: 28 MMOL/L (ref 21–32)
CREAT SERPL-MCNC: 0.82 MG/DL (ref 0.67–1.17)
DEPRECATED RDW RBC: 50.7 FL (ref 39–50)
EGFRCR SERPLBLD CKD-EPI 2021: >90 ML/MIN/{1.73_M2}
EOSINOPHIL # BLD: 0.1 K/MCL (ref 0–0.5)
EOSINOPHIL NFR BLD: 1 %
ERYTHROCYTE [DISTWIDTH] IN BLOOD: 14.2 % (ref 11–15)
FASTING DURATION TIME PATIENT: ABNORMAL H
GLOBULIN SER-MCNC: 2.7 G/DL (ref 2–4)
GLUCOSE SERPL-MCNC: 88 MG/DL (ref 70–99)
HCT VFR BLD CALC: 46.8 % (ref 39–51)
HGB BLD-MCNC: 14.8 G/DL (ref 13–17)
IMM GRANULOCYTES # BLD AUTO: 0 K/MCL (ref 0–0.2)
IMM GRANULOCYTES # BLD: 0 %
LYMPHOCYTES # BLD: 1.7 K/MCL (ref 1–4)
LYMPHOCYTES NFR BLD: 31 %
MAGNESIUM SERPL-MCNC: 2.2 MG/DL (ref 1.7–2.4)
MCH RBC QN AUTO: 30.5 PG (ref 26–34)
MCHC RBC AUTO-ENTMCNC: 31.6 G/DL (ref 32–36.5)
MCV RBC AUTO: 96.3 FL (ref 78–100)
MONOCYTES # BLD: 0.4 K/MCL (ref 0.3–0.9)
MONOCYTES NFR BLD: 8 %
NEUTROPHILS # BLD: 3.4 K/MCL (ref 1.8–7.7)
NEUTROPHILS NFR BLD: 60 %
NRBC BLD MANUAL-RTO: 0 /100 WBC
PLATELET # BLD AUTO: 178 K/MCL (ref 140–450)
POTASSIUM SERPL-SCNC: 3.9 MMOL/L (ref 3.4–5.1)
PROT SERPL-MCNC: 6.1 G/DL (ref 6.4–8.2)
RBC # BLD: 4.86 MIL/MCL (ref 4.5–5.9)
SODIUM SERPL-SCNC: 143 MMOL/L (ref 135–145)
TSH SERPL-ACNC: 0.56 MCUNITS/ML (ref 0.35–5)
WBC # BLD: 5.6 K/MCL (ref 4.2–11)

## 2024-05-13 PROCEDURE — 85025 COMPLETE CBC W/AUTO DIFF WBC: CPT | Performed by: STUDENT IN AN ORGANIZED HEALTH CARE EDUCATION/TRAINING PROGRAM

## 2024-05-13 PROCEDURE — 80053 COMPREHEN METABOLIC PANEL: CPT | Performed by: STUDENT IN AN ORGANIZED HEALTH CARE EDUCATION/TRAINING PROGRAM

## 2024-05-13 PROCEDURE — 90792 PSYCH DIAG EVAL W/MED SRVCS: CPT | Performed by: PSYCHIATRY & NEUROLOGY

## 2024-05-13 PROCEDURE — 10004651 HB RX, NO CHARGE ITEM: Performed by: STUDENT IN AN ORGANIZED HEALTH CARE EDUCATION/TRAINING PROGRAM

## 2024-05-13 PROCEDURE — 10002803 HB RX 637: Performed by: STUDENT IN AN ORGANIZED HEALTH CARE EDUCATION/TRAINING PROGRAM

## 2024-05-13 PROCEDURE — G0378 HOSPITAL OBSERVATION PER HR: HCPCS

## 2024-05-13 PROCEDURE — 84443 ASSAY THYROID STIM HORMONE: CPT | Performed by: STUDENT IN AN ORGANIZED HEALTH CARE EDUCATION/TRAINING PROGRAM

## 2024-05-13 PROCEDURE — 36415 COLL VENOUS BLD VENIPUNCTURE: CPT | Performed by: STUDENT IN AN ORGANIZED HEALTH CARE EDUCATION/TRAINING PROGRAM

## 2024-05-13 PROCEDURE — 10002807 HB RX 258: Performed by: STUDENT IN AN ORGANIZED HEALTH CARE EDUCATION/TRAINING PROGRAM

## 2024-05-13 PROCEDURE — 83735 ASSAY OF MAGNESIUM: CPT | Performed by: STUDENT IN AN ORGANIZED HEALTH CARE EDUCATION/TRAINING PROGRAM

## 2024-05-13 RX ORDER — HYDROXYZINE 50 MG/1
50 TABLET, FILM COATED ORAL EVERY 8 HOURS PRN
Qty: 30 TABLET | Refills: 0 | Status: ON HOLD | OUTPATIENT
Start: 2024-05-13

## 2024-05-13 RX ORDER — CLOPIDOGREL BISULFATE 75 MG/1
75 TABLET ORAL DAILY
Status: DISCONTINUED | OUTPATIENT
Start: 2024-05-13 | End: 2024-05-13 | Stop reason: HOSPADM

## 2024-05-13 RX ORDER — LOSARTAN POTASSIUM 50 MG/1
50 TABLET ORAL DAILY
Status: DISCONTINUED | OUTPATIENT
Start: 2024-05-13 | End: 2024-05-13 | Stop reason: HOSPADM

## 2024-05-13 RX ORDER — CLONAZEPAM 1 MG/1
4 TABLET ORAL NIGHTLY
Status: DISCONTINUED | OUTPATIENT
Start: 2024-05-13 | End: 2024-05-13 | Stop reason: HOSPADM

## 2024-05-13 RX ORDER — ASPIRIN 81 MG/1
81 TABLET, CHEWABLE ORAL DAILY
Status: DISCONTINUED | OUTPATIENT
Start: 2024-05-13 | End: 2024-05-13

## 2024-05-13 RX ORDER — ROSUVASTATIN CALCIUM 20 MG/1
20 TABLET, COATED ORAL DAILY
Status: DISCONTINUED | OUTPATIENT
Start: 2024-05-13 | End: 2024-05-13 | Stop reason: HOSPADM

## 2024-05-13 RX ORDER — TAMSULOSIN HYDROCHLORIDE 0.4 MG/1
0.4 CAPSULE ORAL
Status: DISCONTINUED | OUTPATIENT
Start: 2024-05-13 | End: 2024-05-13 | Stop reason: HOSPADM

## 2024-05-13 RX ORDER — PREGABALIN 100 MG/1
100 CAPSULE ORAL 2 TIMES DAILY
Status: DISCONTINUED | OUTPATIENT
Start: 2024-05-13 | End: 2024-05-13

## 2024-05-13 RX ADMIN — HYDROXYZINE HYDROCHLORIDE 50 MG: 25 TABLET ORAL at 13:34

## 2024-05-13 RX ADMIN — SODIUM CHLORIDE: 9 INJECTION, SOLUTION INTRAVENOUS at 04:52

## 2024-05-13 RX ADMIN — TAMSULOSIN HYDROCHLORIDE 0.4 MG: 0.4 CAPSULE ORAL at 10:30

## 2024-05-13 RX ADMIN — SERTRALINE HYDROCHLORIDE 50 MG: 50 TABLET, FILM COATED ORAL at 10:29

## 2024-05-13 RX ADMIN — ROSUVASTATIN CALCIUM 20 MG: 20 TABLET, FILM COATED ORAL at 10:29

## 2024-05-13 RX ADMIN — LOSARTAN POTASSIUM 50 MG: 50 TABLET, FILM COATED ORAL at 10:29

## 2024-05-13 RX ADMIN — CLOPIDOGREL BISULFATE 75 MG: 75 TABLET, FILM COATED ORAL at 10:30

## 2024-05-13 RX ADMIN — SODIUM CHLORIDE, PRESERVATIVE FREE 2 ML: 5 INJECTION INTRAVENOUS at 10:31

## 2024-05-13 ASSESSMENT — PAIN SCALES - GENERAL: PAINLEVEL_OUTOF10: 0

## 2024-05-14 ENCOUNTER — HOSPITAL ENCOUNTER (OUTPATIENT)
Age: 64
Setting detail: OBSERVATION
LOS: 1 days | Discharge: PSYCHIATRIC OR SUBSTANCE USE FACILITY | End: 2024-05-16
Attending: STUDENT IN AN ORGANIZED HEALTH CARE EDUCATION/TRAINING PROGRAM | Admitting: INTERNAL MEDICINE

## 2024-05-14 DIAGNOSIS — R74.01 TRANSAMINITIS: ICD-10-CM

## 2024-05-14 DIAGNOSIS — R45.851 SUICIDAL IDEATION: ICD-10-CM

## 2024-05-14 DIAGNOSIS — T40.602A: ICD-10-CM

## 2024-05-14 DIAGNOSIS — F19.10 POLYSUBSTANCE ABUSE  (CMD): Primary | ICD-10-CM

## 2024-05-14 PROBLEM — T50.902A DRUG OVERDOSE, INTENTIONAL SELF-HARM, INITIAL ENCOUNTER  (CMD): Status: ACTIVE | Noted: 2024-05-14

## 2024-05-14 LAB
ALBUMIN SERPL-MCNC: 3.9 G/DL (ref 3.6–5.1)
ALBUMIN SERPL-MCNC: 3.9 G/DL (ref 3.6–5.1)
ALBUMIN/GLOB SERPL: 1.1 {RATIO} (ref 1–2.4)
ALP SERPL-CCNC: 67 UNITS/L (ref 45–117)
ALP SERPL-CCNC: 68 UNITS/L (ref 45–117)
ALT SERPL-CCNC: 99 UNITS/L
ALT SERPL-CCNC: 99 UNITS/L
ANION GAP SERPL CALC-SCNC: 7 MMOL/L (ref 7–19)
APAP SERPL-MCNC: 13 MCG/ML (ref 10–30)
APTT PPP: 30 SEC (ref 22–32)
AST SERPL-CCNC: 58 UNITS/L
AST SERPL-CCNC: 64 UNITS/L
BASOPHILS # BLD: 0 K/MCL (ref 0–0.3)
BASOPHILS NFR BLD: 0 %
BILIRUB CONJ SERPL-MCNC: <0.1 MG/DL (ref 0–0.2)
BILIRUB SERPL-MCNC: 0.4 MG/DL (ref 0.2–1)
BILIRUB SERPL-MCNC: 0.6 MG/DL (ref 0.2–1)
BUN SERPL-MCNC: 23 MG/DL (ref 6–20)
BUN/CREAT SERPL: 25 (ref 7–25)
CALCIUM SERPL-MCNC: 9.5 MG/DL (ref 8.4–10.2)
CHLORIDE SERPL-SCNC: 104 MMOL/L (ref 97–110)
CO2 SERPL-SCNC: 32 MMOL/L (ref 21–32)
CREAT SERPL-MCNC: 0.92 MG/DL (ref 0.67–1.17)
DEPRECATED RDW RBC: 49.1 FL (ref 39–50)
EGFRCR SERPLBLD CKD-EPI 2021: >90 ML/MIN/{1.73_M2}
EOSINOPHIL # BLD: 0 K/MCL (ref 0–0.5)
EOSINOPHIL NFR BLD: 1 %
ERYTHROCYTE [DISTWIDTH] IN BLOOD: 14 % (ref 11–15)
ETHANOL SERPL-MCNC: NORMAL MG/DL
FASTING DURATION TIME PATIENT: ABNORMAL H
GLOBULIN SER-MCNC: 3.4 G/DL (ref 2–4)
GLUCOSE SERPL-MCNC: 96 MG/DL (ref 70–99)
HCT VFR BLD CALC: 49.3 % (ref 39–51)
HGB BLD-MCNC: 15.8 G/DL (ref 13–17)
IMM GRANULOCYTES # BLD AUTO: 0 K/MCL (ref 0–0.2)
IMM GRANULOCYTES # BLD: 0 %
INR PPP: 1.1
LYMPHOCYTES # BLD: 2.1 K/MCL (ref 1–4)
LYMPHOCYTES NFR BLD: 25 %
MCH RBC QN AUTO: 30.3 PG (ref 26–34)
MCHC RBC AUTO-ENTMCNC: 32 G/DL (ref 32–36.5)
MCV RBC AUTO: 94.4 FL (ref 78–100)
MONOCYTES # BLD: 0.6 K/MCL (ref 0.3–0.9)
MONOCYTES NFR BLD: 8 %
NEUTROPHILS # BLD: 5.6 K/MCL (ref 1.8–7.7)
NEUTROPHILS NFR BLD: 66 %
NRBC BLD MANUAL-RTO: 0 /100 WBC
PLATELET # BLD AUTO: 172 K/MCL (ref 140–450)
POTASSIUM SERPL-SCNC: 4.4 MMOL/L (ref 3.4–5.1)
PROT SERPL-MCNC: 7.3 G/DL (ref 6.4–8.2)
PROT SERPL-MCNC: 7.3 G/DL (ref 6.4–8.2)
PROTHROMBIN TIME: 11.7 SEC (ref 9.7–11.8)
RBC # BLD: 5.22 MIL/MCL (ref 4.5–5.9)
SALICYLATES SERPL-MCNC: <2.8 MG/DL
SODIUM SERPL-SCNC: 139 MMOL/L (ref 135–145)
WBC # BLD: 8.4 K/MCL (ref 4.2–11)

## 2024-05-14 PROCEDURE — 85610 PROTHROMBIN TIME: CPT | Performed by: STUDENT IN AN ORGANIZED HEALTH CARE EDUCATION/TRAINING PROGRAM

## 2024-05-14 PROCEDURE — 80143 DRUG ASSAY ACETAMINOPHEN: CPT | Performed by: STUDENT IN AN ORGANIZED HEALTH CARE EDUCATION/TRAINING PROGRAM

## 2024-05-14 PROCEDURE — 93010 ELECTROCARDIOGRAM REPORT: CPT | Performed by: INTERNAL MEDICINE

## 2024-05-14 PROCEDURE — 85730 THROMBOPLASTIN TIME PARTIAL: CPT | Performed by: STUDENT IN AN ORGANIZED HEALTH CARE EDUCATION/TRAINING PROGRAM

## 2024-05-14 PROCEDURE — 99285 EMERGENCY DEPT VISIT HI MDM: CPT

## 2024-05-14 PROCEDURE — 10006031 HB ROOM CHARGE TELEMETRY

## 2024-05-14 PROCEDURE — 80076 HEPATIC FUNCTION PANEL: CPT | Performed by: STUDENT IN AN ORGANIZED HEALTH CARE EDUCATION/TRAINING PROGRAM

## 2024-05-14 PROCEDURE — 80179 DRUG ASSAY SALICYLATE: CPT | Performed by: STUDENT IN AN ORGANIZED HEALTH CARE EDUCATION/TRAINING PROGRAM

## 2024-05-14 PROCEDURE — 93005 ELECTROCARDIOGRAM TRACING: CPT | Performed by: STUDENT IN AN ORGANIZED HEALTH CARE EDUCATION/TRAINING PROGRAM

## 2024-05-14 PROCEDURE — 80053 COMPREHEN METABOLIC PANEL: CPT | Performed by: STUDENT IN AN ORGANIZED HEALTH CARE EDUCATION/TRAINING PROGRAM

## 2024-05-14 PROCEDURE — 82077 ASSAY SPEC XCP UR&BREATH IA: CPT | Performed by: STUDENT IN AN ORGANIZED HEALTH CARE EDUCATION/TRAINING PROGRAM

## 2024-05-14 PROCEDURE — 85025 COMPLETE CBC W/AUTO DIFF WBC: CPT | Performed by: STUDENT IN AN ORGANIZED HEALTH CARE EDUCATION/TRAINING PROGRAM

## 2024-05-14 SDOH — ECONOMIC STABILITY: HOUSING INSECURITY: WHAT IS YOUR LIVING SITUATION TODAY?: HOUSE

## 2024-05-14 SDOH — SOCIAL STABILITY: SOCIAL INSECURITY: HOW OFTEN DOES ANYONE, INCLUDING FAMILY AND FRIENDS, SCREAM OR CURSE AT YOU?: NEVER

## 2024-05-14 SDOH — ECONOMIC STABILITY: INCOME INSECURITY: IN THE PAST 12 MONTHS, HAS THE ELECTRIC, GAS, OIL, OR WATER COMPANY THREATENED TO SHUT OFF SERVICE IN YOUR HOME?: NO

## 2024-05-14 SDOH — ECONOMIC STABILITY: HOUSING INSECURITY: DO YOU HAVE PROBLEMS WITH ANY OF THE FOLLOWING?: NONE OF THE ABOVE

## 2024-05-14 SDOH — HEALTH STABILITY: GENERAL
BECAUSE OF A PHYSICAL, MENTAL, OR EMOTIONAL CONDITION, DO YOU HAVE SERIOUS DIFFICULTY CONCENTRATING, REMEMBERING OR MAKING DECISIONS?: NO

## 2024-05-14 SDOH — HEALTH STABILITY: PHYSICAL HEALTH: DO YOU HAVE DIFFICULTY DRESSING OR BATHING?: NO

## 2024-05-14 SDOH — ECONOMIC STABILITY: FOOD INSECURITY: WITHIN THE PAST 12 MONTHS, THE FOOD YOU BOUGHT JUST DIDN'T LAST AND YOU DIDN'T HAVE MONEY TO GET MORE.: NEVER TRUE

## 2024-05-14 SDOH — SOCIAL STABILITY: SOCIAL NETWORK
HOW OFTEN DO YOU SEE OR TALK TO PEOPLE THAT YOU CARE ABOUT AND FEEL CLOSE TO? (FOR EXAMPLE: TALKING TO FRIENDS ON THE PHONE, VISITING FRIENDS OR FAMILY, GOING TO CHURCH OR CLUB MEETINGS): LESS THAN ONCE A WEEK

## 2024-05-14 SDOH — HEALTH STABILITY: PHYSICAL HEALTH: DO YOU HAVE SERIOUS DIFFICULTY WALKING OR CLIMBING STAIRS?: NO

## 2024-05-14 SDOH — SOCIAL STABILITY: SOCIAL INSECURITY: HOW OFTEN DOES ANYONE, INCLUDING FAMILY AND FRIENDS, THREATEN YOU WITH HARM?: NEVER

## 2024-05-14 SDOH — SOCIAL STABILITY: SOCIAL NETWORK

## 2024-05-14 SDOH — HEALTH STABILITY: GENERAL: BECAUSE OF A PHYSICAL, MENTAL, OR EMOTIONAL CONDITION, DO YOU HAVE DIFFICULTY DOING ERRANDS ALONE?: NO

## 2024-05-14 SDOH — ECONOMIC STABILITY: HOUSING INSECURITY: WHAT IS YOUR LIVING SITUATION TODAY?: ALONE

## 2024-05-14 SDOH — SOCIAL STABILITY: SOCIAL INSECURITY: HOW OFTEN DOES ANYONE, INCLUDING FAMILY AND FRIENDS, INSULT OR TALK DOWN TO YOU?: NEVER

## 2024-05-14 SDOH — SOCIAL STABILITY: SOCIAL INSECURITY: HOW OFTEN DOES ANYONE, INCLUDING FAMILY AND FRIENDS, PHYSICALLY HURT YOU?: NEVER

## 2024-05-14 SDOH — ECONOMIC STABILITY: GENERAL

## 2024-05-14 SDOH — ECONOMIC STABILITY: HOUSING INSECURITY: WHAT IS YOUR LIVING SITUATION TODAY?: I HAVE A STEADY PLACE TO LIVE

## 2024-05-14 ASSESSMENT — PATIENT HEALTH QUESTIONNAIRE - PHQ9
CLINICAL INTERPRETATION OF PHQ9 SCORE: MODERATE DEPRESSION
SUM OF ALL RESPONSES TO PHQ9 QUESTIONS 1 AND 2: 5
3. TROUBLE FALLING OR STAYING ASLEEP OR SLEEPING TOO MUCH: NEARLY EVERY DAY
SUM OF ALL RESPONSES TO PHQ QUESTIONS 1-9: 14
10. IF YOU CHECKED OFF ANY PROBLEMS, HOW DIFFICULT HAVE THESE PROBLEMS MADE IT FOR YOU TO DO YOUR WORK, TAKE CARE OF THINGS AT HOME, OR GET ALONG WITH OTHER PEOPLE: SOMEWHAT DIFFICULT
SUM OF ALL RESPONSES TO PHQ9 QUESTIONS 1 AND 2: 5
1. LITTLE INTEREST OR PLEASURE IN DOING THINGS: MORE THAN HALF THE DAYS
6. FEELING BAD ABOUT YOURSELF - OR THAT YOU ARE A FAILURE OR HAVE LET YOURSELF OR YOUR FAMILY DOWN: SEVERAL DAYS
5. POOR APPETITE OR OVEREATING: NOT AT ALL
IS PATIENT ABLE TO COMPLETE PHQ2 OR PHQ9: YES
4. FEELING TIRED OR HAVING LITTLE ENERGY: NEARLY EVERY DAY
9. THOUGHTS THAT YOU WOULD BE BETTER OFF DEAD, OR OF HURTING YOURSELF: SEVERAL DAYS
7. TROUBLE CONCENTRATING ON THINGS, SUCH AS READING THE NEWSPAPER OR WATCHING TELEVISION: SEVERAL DAYS
CLINICAL INTERPRETATION OF PHQ2 SCORE: FURTHER SCREENING NEEDED
8. MOVING OR SPEAKING SO SLOWLY THAT OTHER PEOPLE COULD HAVE NOTICED. OR THE OPPOSITE, BEING SO FIGETY OR RESTLESS THAT YOU HAVE BEEN MOVING AROUND A LOT MORE THAN USUAL: NOT AT ALL
2. FEELING DOWN, DEPRESSED OR HOPELESS: NEARLY EVERY DAY

## 2024-05-14 ASSESSMENT — ACTIVITIES OF DAILY LIVING (ADL)
ADL_SHORT_OF_BREATH: NO
ADL_BEFORE_ADMISSION: INDEPENDENT
RECENT_DECLINE_ADL: NO
ADL_SCORE: 12

## 2024-05-14 ASSESSMENT — PAIN SCALES - GENERAL: PAINLEVEL_OUTOF10: 0

## 2024-05-14 ASSESSMENT — LIFESTYLE VARIABLES
HOW MANY STANDARD DRINKS CONTAINING ALCOHOL DO YOU HAVE ON A TYPICAL DAY: 0,1 OR 2
HOW OFTEN DO YOU HAVE A DRINK CONTAINING ALCOHOL: NEVER
AUDIT-C TOTAL SCORE: 0
HOW OFTEN DO YOU HAVE 6 OR MORE DRINKS ON ONE OCCASION: NEVER
ALCOHOL_USE_STATUS: NO OR LOW RISK WITH VALIDATED TOOL

## 2024-05-14 ASSESSMENT — COLUMBIA-SUICIDE SEVERITY RATING SCALE - C-SSRS
5. HAVE YOU STARTED TO WORK OUT OR WORKED OUT THE DETAILS OF HOW TO KILL YOURSELF? DO YOU INTEND TO CARRY OUT THIS PLAN?: YES
HOW LONG AGO DID YOU DO ANY OF THESE?: WITHIN THE LAST THREE MONTHS
6. HAVE YOU EVER DONE ANYTHING, STARTED TO DO ANYTHING, OR PREPARED TO DO ANYTHING TO END YOUR LIFE?: YES
4. HAVE YOU HAD THESE THOUGHTS AND HAD SOME INTENTION OF ACTING ON THEM?: YES
1. WITHIN THE PAST MONTH, HAVE YOU WISHED YOU WERE DEAD OR WISHED YOU COULD GO TO SLEEP AND NOT WAKE UP?: YES
IS THE PATIENT ABLE TO COMPLETE C-SSRS: YES
3. HAVE YOU BEEN THINKING ABOUT HOW YOU MIGHT KILL YOURSELF?: YES
2. HAVE YOU ACTUALLY HAD ANY THOUGHTS OF KILLING YOURSELF?: YES

## 2024-05-15 LAB
ALBUMIN SERPL-MCNC: 3.4 G/DL (ref 3.6–5.1)
ALP SERPL-CCNC: 58 UNITS/L (ref 45–117)
ALT SERPL-CCNC: 81 UNITS/L
APAP SERPL-MCNC: 6 MCG/ML (ref 10–30)
AST SERPL-CCNC: 43 UNITS/L
ATRIAL RATE (BPM): 79
BILIRUB CONJ SERPL-MCNC: 0.1 MG/DL (ref 0–0.2)
BILIRUB SERPL-MCNC: 0.3 MG/DL (ref 0.2–1)
P AXIS (DEGREES): 53
PR-INTERVAL (MSEC): 142
PROT SERPL-MCNC: 6.1 G/DL (ref 6.4–8.2)
QRS-INTERVAL (MSEC): 96
QT-INTERVAL (MSEC): 414
QTC: 475
R AXIS (DEGREES): 15
REPORT TEXT: NORMAL
T AXIS (DEGREES): 15
VENTRICULAR RATE EKG/MIN (BPM): 79

## 2024-05-15 PROCEDURE — 10002803 HB RX 637: Performed by: STUDENT IN AN ORGANIZED HEALTH CARE EDUCATION/TRAINING PROGRAM

## 2024-05-15 PROCEDURE — 10002803 HB RX 637: Performed by: PATHOLOGY

## 2024-05-15 PROCEDURE — G0378 HOSPITAL OBSERVATION PER HR: HCPCS

## 2024-05-15 PROCEDURE — 10004651 HB RX, NO CHARGE ITEM: Performed by: STUDENT IN AN ORGANIZED HEALTH CARE EDUCATION/TRAINING PROGRAM

## 2024-05-15 PROCEDURE — 80143 DRUG ASSAY ACETAMINOPHEN: CPT | Performed by: STUDENT IN AN ORGANIZED HEALTH CARE EDUCATION/TRAINING PROGRAM

## 2024-05-15 PROCEDURE — 80076 HEPATIC FUNCTION PANEL: CPT | Performed by: STUDENT IN AN ORGANIZED HEALTH CARE EDUCATION/TRAINING PROGRAM

## 2024-05-15 PROCEDURE — 36415 COLL VENOUS BLD VENIPUNCTURE: CPT | Performed by: STUDENT IN AN ORGANIZED HEALTH CARE EDUCATION/TRAINING PROGRAM

## 2024-05-15 PROCEDURE — 99222 1ST HOSP IP/OBS MODERATE 55: CPT | Performed by: PATHOLOGY

## 2024-05-15 RX ORDER — LOSARTAN POTASSIUM 50 MG/1
50 TABLET ORAL DAILY
Status: DISCONTINUED | OUTPATIENT
Start: 2024-05-15 | End: 2024-05-16 | Stop reason: HOSPADM

## 2024-05-15 RX ORDER — POLYETHYLENE GLYCOL 3350 17 G/17G
17 POWDER, FOR SOLUTION ORAL DAILY PRN
Status: DISCONTINUED | OUTPATIENT
Start: 2024-05-15 | End: 2024-05-16 | Stop reason: HOSPADM

## 2024-05-15 RX ORDER — AMOXICILLIN 250 MG
2 CAPSULE ORAL 2 TIMES DAILY PRN
Status: DISCONTINUED | OUTPATIENT
Start: 2024-05-15 | End: 2024-05-16 | Stop reason: HOSPADM

## 2024-05-15 RX ORDER — DULOXETIN HYDROCHLORIDE 30 MG/1
30 CAPSULE, DELAYED RELEASE ORAL 2 TIMES DAILY
Qty: 60 CAPSULE | Refills: 3 | Status: ON HOLD | OUTPATIENT
Start: 2024-05-15 | End: 2024-05-24 | Stop reason: HOSPADM

## 2024-05-15 RX ORDER — TAMSULOSIN HYDROCHLORIDE 0.4 MG/1
0.4 CAPSULE ORAL
Status: DISCONTINUED | OUTPATIENT
Start: 2024-05-15 | End: 2024-05-16 | Stop reason: HOSPADM

## 2024-05-15 RX ORDER — CLONAZEPAM 1 MG/1
4 TABLET ORAL NIGHTLY
Status: DISCONTINUED | OUTPATIENT
Start: 2024-05-15 | End: 2024-05-16 | Stop reason: HOSPADM

## 2024-05-15 RX ORDER — CLOPIDOGREL BISULFATE 75 MG/1
75 TABLET ORAL DAILY
Status: DISCONTINUED | OUTPATIENT
Start: 2024-05-15 | End: 2024-05-16 | Stop reason: HOSPADM

## 2024-05-15 RX ORDER — IBUPROFEN 400 MG/1
400 TABLET ORAL EVERY 6 HOURS PRN
Status: DISCONTINUED | OUTPATIENT
Start: 2024-05-15 | End: 2024-05-16 | Stop reason: HOSPADM

## 2024-05-15 RX ORDER — ENOXAPARIN SODIUM 100 MG/ML
40 INJECTION SUBCUTANEOUS DAILY
Status: DISCONTINUED | OUTPATIENT
Start: 2024-05-15 | End: 2024-05-16 | Stop reason: HOSPADM

## 2024-05-15 RX ORDER — HYDROXYZINE HYDROCHLORIDE 25 MG/1
50 TABLET, FILM COATED ORAL EVERY 8 HOURS PRN
Status: DISCONTINUED | OUTPATIENT
Start: 2024-05-15 | End: 2024-05-16 | Stop reason: HOSPADM

## 2024-05-15 RX ORDER — FOLIC ACID 1 MG/1
1 TABLET ORAL DAILY
Status: DISCONTINUED | OUTPATIENT
Start: 2024-05-15 | End: 2024-05-16 | Stop reason: HOSPADM

## 2024-05-15 RX ORDER — LANOLIN ALCOHOL/MO/W.PET/CERES
9 CREAM (GRAM) TOPICAL NIGHTLY PRN
Status: DISCONTINUED | OUTPATIENT
Start: 2024-05-15 | End: 2024-05-16 | Stop reason: HOSPADM

## 2024-05-15 RX ORDER — DULOXETIN HYDROCHLORIDE 30 MG/1
30 CAPSULE, DELAYED RELEASE ORAL 2 TIMES DAILY
Status: DISCONTINUED | OUTPATIENT
Start: 2024-05-15 | End: 2024-05-16 | Stop reason: HOSPADM

## 2024-05-15 RX ORDER — ONDANSETRON 2 MG/ML
4 INJECTION INTRAMUSCULAR; INTRAVENOUS EVERY 12 HOURS PRN
Status: DISCONTINUED | OUTPATIENT
Start: 2024-05-15 | End: 2024-05-16 | Stop reason: HOSPADM

## 2024-05-15 RX ORDER — HYDROXYZINE HYDROCHLORIDE 25 MG/1
25 TABLET, FILM COATED ORAL EVERY 4 HOURS PRN
Status: DISCONTINUED | OUTPATIENT
Start: 2024-05-15 | End: 2024-05-16 | Stop reason: HOSPADM

## 2024-05-15 RX ORDER — ASCORBIC ACID 500 MG
500 TABLET ORAL DAILY
COMMUNITY

## 2024-05-15 RX ORDER — ACETAMINOPHEN 325 MG/1
650 TABLET ORAL EVERY 4 HOURS PRN
Status: DISCONTINUED | OUTPATIENT
Start: 2024-05-15 | End: 2024-05-15 | Stop reason: SDUPTHER

## 2024-05-15 RX ORDER — ONDANSETRON 4 MG/1
4 TABLET, ORALLY DISINTEGRATING ORAL EVERY 12 HOURS PRN
Status: DISCONTINUED | OUTPATIENT
Start: 2024-05-15 | End: 2024-05-16 | Stop reason: HOSPADM

## 2024-05-15 RX ORDER — CLONAZEPAM 1 MG/1
2 TABLET ORAL 2 TIMES DAILY PRN
Status: DISCONTINUED | OUTPATIENT
Start: 2024-05-15 | End: 2024-05-16 | Stop reason: HOSPADM

## 2024-05-15 RX ORDER — 0.9 % SODIUM CHLORIDE 0.9 %
2 VIAL (ML) INJECTION EVERY 12 HOURS SCHEDULED
Status: DISCONTINUED | OUTPATIENT
Start: 2024-05-15 | End: 2024-05-16 | Stop reason: HOSPADM

## 2024-05-15 RX ORDER — BISACODYL 10 MG
10 SUPPOSITORY, RECTAL RECTAL DAILY PRN
Status: DISCONTINUED | OUTPATIENT
Start: 2024-05-15 | End: 2024-05-16 | Stop reason: HOSPADM

## 2024-05-15 RX ORDER — ACETAMINOPHEN 650 MG/1
650 SUPPOSITORY RECTAL EVERY 4 HOURS PRN
Status: DISCONTINUED | OUTPATIENT
Start: 2024-05-15 | End: 2024-05-15 | Stop reason: SDUPTHER

## 2024-05-15 RX ADMIN — SERTRALINE HYDROCHLORIDE 50 MG: 50 TABLET, FILM COATED ORAL at 11:35

## 2024-05-15 RX ADMIN — FOLIC ACID 1 MG: 1 TABLET ORAL at 11:35

## 2024-05-15 RX ADMIN — SODIUM CHLORIDE, PRESERVATIVE FREE 2 ML: 5 INJECTION INTRAVENOUS at 09:30

## 2024-05-15 RX ADMIN — CLOPIDOGREL BISULFATE 75 MG: 75 TABLET, FILM COATED ORAL at 11:35

## 2024-05-15 RX ADMIN — Medication 100 MG: at 11:35

## 2024-05-15 RX ADMIN — LOSARTAN POTASSIUM 50 MG: 50 TABLET, FILM COATED ORAL at 11:35

## 2024-05-15 RX ADMIN — HYDROXYZINE HYDROCHLORIDE 50 MG: 25 TABLET ORAL at 13:26

## 2024-05-15 RX ADMIN — DULOXETINE HYDROCHLORIDE 30 MG: 30 CAPSULE, DELAYED RELEASE ORAL at 21:04

## 2024-05-15 RX ADMIN — SODIUM CHLORIDE, PRESERVATIVE FREE 2 ML: 5 INJECTION INTRAVENOUS at 21:05

## 2024-05-15 RX ADMIN — ACETAMINOPHEN 650 MG: 325 TABLET ORAL at 17:07

## 2024-05-15 RX ADMIN — TAMSULOSIN HYDROCHLORIDE 0.4 MG: 0.4 CAPSULE ORAL at 11:35

## 2024-05-15 SDOH — ECONOMIC STABILITY: HOUSING INSECURITY: WHAT IS YOUR LIVING SITUATION TODAY?: I HAVE A STEADY PLACE TO LIVE

## 2024-05-15 SDOH — ECONOMIC STABILITY: GENERAL: WOULD YOU LIKE HELP WITH ANY OF THE FOLLOWING NEEDS?: I DON'T WANT HELP WITH ANY OF THESE

## 2024-05-15 ASSESSMENT — PAIN SCALES - GENERAL
PAINLEVEL_OUTOF10: 0
PAINLEVEL_OUTOF10: 0

## 2024-05-16 ENCOUNTER — HOSPITAL ENCOUNTER (INPATIENT)
Age: 64
DRG: 885 | End: 2024-05-16
Attending: PATHOLOGY | Admitting: PATHOLOGY

## 2024-05-16 VITALS
OXYGEN SATURATION: 95 % | HEART RATE: 100 BPM | RESPIRATION RATE: 19 BRPM | TEMPERATURE: 98.2 F | BODY MASS INDEX: 33.83 KG/M2 | HEIGHT: 70 IN | SYSTOLIC BLOOD PRESSURE: 136 MMHG | WEIGHT: 236.33 LBS | DIASTOLIC BLOOD PRESSURE: 89 MMHG

## 2024-05-16 DIAGNOSIS — T50.902A: Primary | ICD-10-CM

## 2024-05-16 LAB
ALBUMIN SERPL-MCNC: 3.4 G/DL (ref 3.6–5.1)
ALBUMIN/GLOB SERPL: 1.1 {RATIO} (ref 1–2.4)
ALP SERPL-CCNC: 59 UNITS/L (ref 45–117)
ALT SERPL-CCNC: 89 UNITS/L
ANION GAP SERPL CALC-SCNC: 4 MMOL/L (ref 7–19)
AST SERPL-CCNC: 39 UNITS/L
BASOPHILS # BLD: 0 K/MCL (ref 0–0.3)
BASOPHILS NFR BLD: 1 %
BILIRUB SERPL-MCNC: 0.3 MG/DL (ref 0.2–1)
BUN SERPL-MCNC: 18 MG/DL (ref 6–20)
BUN/CREAT SERPL: 23 (ref 7–25)
CALCIUM SERPL-MCNC: 8.7 MG/DL (ref 8.4–10.2)
CHLORIDE SERPL-SCNC: 107 MMOL/L (ref 97–110)
CO2 SERPL-SCNC: 31 MMOL/L (ref 21–32)
CREAT SERPL-MCNC: 0.79 MG/DL (ref 0.67–1.17)
DEPRECATED RDW RBC: 49.5 FL (ref 39–50)
EGFRCR SERPLBLD CKD-EPI 2021: >90 ML/MIN/{1.73_M2}
EOSINOPHIL # BLD: 0.1 K/MCL (ref 0–0.5)
EOSINOPHIL NFR BLD: 2 %
ERYTHROCYTE [DISTWIDTH] IN BLOOD: 14 % (ref 11–15)
FASTING DURATION TIME PATIENT: ABNORMAL H
GLOBULIN SER-MCNC: 3 G/DL (ref 2–4)
GLUCOSE SERPL-MCNC: 93 MG/DL (ref 70–99)
HCT VFR BLD CALC: 49.4 % (ref 39–51)
HGB BLD-MCNC: 15.8 G/DL (ref 13–17)
IMM GRANULOCYTES # BLD AUTO: 0 K/MCL (ref 0–0.2)
IMM GRANULOCYTES # BLD: 0 %
LYMPHOCYTES # BLD: 1.7 K/MCL (ref 1–4)
LYMPHOCYTES NFR BLD: 31 %
MCH RBC QN AUTO: 30.2 PG (ref 26–34)
MCHC RBC AUTO-ENTMCNC: 32 G/DL (ref 32–36.5)
MCV RBC AUTO: 94.3 FL (ref 78–100)
MONOCYTES # BLD: 0.4 K/MCL (ref 0.3–0.9)
MONOCYTES NFR BLD: 7 %
NEUTROPHILS # BLD: 3.3 K/MCL (ref 1.8–7.7)
NEUTROPHILS NFR BLD: 59 %
NRBC BLD MANUAL-RTO: 0 /100 WBC
PLATELET # BLD AUTO: 149 K/MCL (ref 140–450)
POTASSIUM SERPL-SCNC: 3.8 MMOL/L (ref 3.4–5.1)
PROT SERPL-MCNC: 6.4 G/DL (ref 6.4–8.2)
RBC # BLD: 5.24 MIL/MCL (ref 4.5–5.9)
SODIUM SERPL-SCNC: 138 MMOL/L (ref 135–145)
WBC # BLD: 5.6 K/MCL (ref 4.2–11)

## 2024-05-16 PROCEDURE — 10002803 HB RX 637: Performed by: STUDENT IN AN ORGANIZED HEALTH CARE EDUCATION/TRAINING PROGRAM

## 2024-05-16 PROCEDURE — 85025 COMPLETE CBC W/AUTO DIFF WBC: CPT | Performed by: STUDENT IN AN ORGANIZED HEALTH CARE EDUCATION/TRAINING PROGRAM

## 2024-05-16 PROCEDURE — 10004577 HB ROOM CHARGE PSYCH

## 2024-05-16 PROCEDURE — G0378 HOSPITAL OBSERVATION PER HR: HCPCS

## 2024-05-16 PROCEDURE — 10002803 HB RX 637: Performed by: PATHOLOGY

## 2024-05-16 PROCEDURE — 80053 COMPREHEN METABOLIC PANEL: CPT | Performed by: STUDENT IN AN ORGANIZED HEALTH CARE EDUCATION/TRAINING PROGRAM

## 2024-05-16 PROCEDURE — 10004651 HB RX, NO CHARGE ITEM: Performed by: STUDENT IN AN ORGANIZED HEALTH CARE EDUCATION/TRAINING PROGRAM

## 2024-05-16 PROCEDURE — 10002803 HB RX 637: Performed by: INTERNAL MEDICINE

## 2024-05-16 PROCEDURE — 36415 COLL VENOUS BLD VENIPUNCTURE: CPT | Performed by: STUDENT IN AN ORGANIZED HEALTH CARE EDUCATION/TRAINING PROGRAM

## 2024-05-16 RX ORDER — LORAZEPAM 2 MG/ML
1 INJECTION INTRAMUSCULAR EVERY 6 HOURS PRN
Status: DISCONTINUED | OUTPATIENT
Start: 2024-05-16 | End: 2024-05-19

## 2024-05-16 RX ORDER — CLOTRIMAZOLE AND BETAMETHASONE DIPROPIONATE 10; .64 MG/G; MG/G
CREAM TOPICAL EVERY 12 HOURS SCHEDULED
Status: DISCONTINUED | OUTPATIENT
Start: 2024-05-16 | End: 2024-05-24 | Stop reason: HOSPADM

## 2024-05-16 RX ORDER — ASCORBIC ACID 500 MG
500 TABLET ORAL DAILY
Status: DISCONTINUED | OUTPATIENT
Start: 2024-05-17 | End: 2024-05-24 | Stop reason: HOSPADM

## 2024-05-16 RX ORDER — ROSUVASTATIN CALCIUM 20 MG/1
20 TABLET, COATED ORAL DAILY
Status: DISCONTINUED | OUTPATIENT
Start: 2024-05-17 | End: 2024-05-24 | Stop reason: HOSPADM

## 2024-05-16 RX ORDER — TAMSULOSIN HYDROCHLORIDE 0.4 MG/1
0.4 CAPSULE ORAL
Status: DISCONTINUED | OUTPATIENT
Start: 2024-05-17 | End: 2024-05-24 | Stop reason: HOSPADM

## 2024-05-16 RX ORDER — LORAZEPAM 1 MG/1
1 TABLET ORAL EVERY 6 HOURS PRN
Status: DISCONTINUED | OUTPATIENT
Start: 2024-05-16 | End: 2024-05-19

## 2024-05-16 RX ORDER — HYDROCODONE BITARTRATE AND ACETAMINOPHEN 5; 325 MG/1; MG/1
1 TABLET ORAL EVERY 8 HOURS PRN
Status: DISCONTINUED | OUTPATIENT
Start: 2024-05-16 | End: 2024-05-24 | Stop reason: HOSPADM

## 2024-05-16 RX ORDER — LOSARTAN POTASSIUM 50 MG/1
50 TABLET ORAL DAILY
Status: DISCONTINUED | OUTPATIENT
Start: 2024-05-17 | End: 2024-05-24 | Stop reason: HOSPADM

## 2024-05-16 RX ORDER — AMOXICILLIN 250 MG
2 CAPSULE ORAL 2 TIMES DAILY PRN
Status: DISCONTINUED | OUTPATIENT
Start: 2024-05-16 | End: 2024-05-24 | Stop reason: HOSPADM

## 2024-05-16 RX ORDER — POLYETHYLENE GLYCOL 3350 17 G/17G
17 POWDER, FOR SOLUTION ORAL DAILY PRN
Status: DISCONTINUED | OUTPATIENT
Start: 2024-05-16 | End: 2024-05-24 | Stop reason: HOSPADM

## 2024-05-16 RX ORDER — ACETAMINOPHEN 500 MG
500 TABLET ORAL EVERY 4 HOURS PRN
Status: DISCONTINUED | OUTPATIENT
Start: 2024-05-16 | End: 2024-05-24 | Stop reason: HOSPADM

## 2024-05-16 RX ORDER — CLONAZEPAM 1 MG/1
4 TABLET ORAL NIGHTLY
Status: DISCONTINUED | OUTPATIENT
Start: 2024-05-16 | End: 2024-05-18

## 2024-05-16 RX ORDER — HYDROXYZINE HYDROCHLORIDE 25 MG/1
50 TABLET, FILM COATED ORAL EVERY 4 HOURS PRN
Status: DISCONTINUED | OUTPATIENT
Start: 2024-05-16 | End: 2024-05-24 | Stop reason: HOSPADM

## 2024-05-16 RX ORDER — CLOPIDOGREL BISULFATE 75 MG/1
75 TABLET ORAL DAILY
Status: DISCONTINUED | OUTPATIENT
Start: 2024-05-17 | End: 2024-05-24 | Stop reason: HOSPADM

## 2024-05-16 RX ORDER — DULOXETIN HYDROCHLORIDE 30 MG/1
30 CAPSULE, DELAYED RELEASE ORAL 2 TIMES DAILY
Status: DISCONTINUED | OUTPATIENT
Start: 2024-05-16 | End: 2024-05-24 | Stop reason: HOSPADM

## 2024-05-16 RX ORDER — TRAZODONE HYDROCHLORIDE 50 MG/1
50 TABLET ORAL NIGHTLY PRN
Status: DISCONTINUED | OUTPATIENT
Start: 2024-05-16 | End: 2024-05-19

## 2024-05-16 RX ORDER — BENZTROPINE MESYLATE 1 MG/1
1 TABLET ORAL EVERY 6 HOURS PRN
Status: DISCONTINUED | OUTPATIENT
Start: 2024-05-16 | End: 2024-05-24 | Stop reason: HOSPADM

## 2024-05-16 RX ORDER — BENZTROPINE MESYLATE 1 MG/ML
1 INJECTION INTRAMUSCULAR; INTRAVENOUS EVERY 6 HOURS PRN
Status: DISCONTINUED | OUTPATIENT
Start: 2024-05-16 | End: 2024-05-24 | Stop reason: HOSPADM

## 2024-05-16 RX ORDER — TIZANIDINE 4 MG/1
4 TABLET ORAL EVERY 8 HOURS PRN
Status: DISCONTINUED | OUTPATIENT
Start: 2024-05-16 | End: 2024-05-16 | Stop reason: HOSPADM

## 2024-05-16 RX ORDER — ACETAMINOPHEN 325 MG/1
650 TABLET ORAL EVERY 4 HOURS PRN
Status: DISCONTINUED | OUTPATIENT
Start: 2024-05-16 | End: 2024-05-24 | Stop reason: HOSPADM

## 2024-05-16 RX ORDER — HALOPERIDOL 5 MG/1
5 TABLET ORAL EVERY 6 HOURS PRN
Status: DISCONTINUED | OUTPATIENT
Start: 2024-05-16 | End: 2024-05-24 | Stop reason: HOSPADM

## 2024-05-16 RX ORDER — ACETAMINOPHEN 500 MG
1000 TABLET ORAL EVERY 6 HOURS PRN
Status: DISCONTINUED | OUTPATIENT
Start: 2024-05-16 | End: 2024-05-16

## 2024-05-16 RX ORDER — MAGNESIUM HYDROXIDE/ALUMINUM HYDROXICE/SIMETHICONE 120; 1200; 1200 MG/30ML; MG/30ML; MG/30ML
30 SUSPENSION ORAL EVERY 4 HOURS PRN
Status: DISCONTINUED | OUTPATIENT
Start: 2024-05-16 | End: 2024-05-24 | Stop reason: HOSPADM

## 2024-05-16 RX ORDER — HALOPERIDOL 5 MG/ML
5 INJECTION INTRAMUSCULAR EVERY 6 HOURS PRN
Status: DISCONTINUED | OUTPATIENT
Start: 2024-05-16 | End: 2024-05-24 | Stop reason: HOSPADM

## 2024-05-16 RX ADMIN — DULOXETINE HYDROCHLORIDE 30 MG: 30 CAPSULE, DELAYED RELEASE ORAL at 22:25

## 2024-05-16 RX ADMIN — TIZANIDINE 4 MG: 4 TABLET ORAL at 05:22

## 2024-05-16 RX ADMIN — LOSARTAN POTASSIUM 50 MG: 50 TABLET, FILM COATED ORAL at 08:21

## 2024-05-16 RX ADMIN — CLONAZEPAM 4 MG: 1 TABLET ORAL at 00:01

## 2024-05-16 RX ADMIN — IBUPROFEN 400 MG: 400 TABLET, FILM COATED ORAL at 00:04

## 2024-05-16 RX ADMIN — FOLIC ACID 1 MG: 1 TABLET ORAL at 08:22

## 2024-05-16 RX ADMIN — Medication 100 MG: at 08:21

## 2024-05-16 RX ADMIN — CLOPIDOGREL BISULFATE 75 MG: 75 TABLET, FILM COATED ORAL at 08:21

## 2024-05-16 RX ADMIN — CLONAZEPAM 4 MG: 1 TABLET ORAL at 22:25

## 2024-05-16 RX ADMIN — TAMSULOSIN HYDROCHLORIDE 0.4 MG: 0.4 CAPSULE ORAL at 08:22

## 2024-05-16 RX ADMIN — DULOXETINE HYDROCHLORIDE 30 MG: 30 CAPSULE, DELAYED RELEASE ORAL at 08:21

## 2024-05-16 RX ADMIN — HYDROXYZINE HYDROCHLORIDE 50 MG: 25 TABLET ORAL at 20:23

## 2024-05-16 RX ADMIN — HYDROXYZINE HYDROCHLORIDE 50 MG: 25 TABLET ORAL at 14:06

## 2024-05-16 RX ADMIN — SODIUM CHLORIDE, PRESERVATIVE FREE 2 ML: 5 INJECTION INTRAVENOUS at 08:22

## 2024-05-16 RX ADMIN — TRAZODONE HYDROCHLORIDE 50 MG: 50 TABLET ORAL at 22:25

## 2024-05-16 RX ADMIN — TIZANIDINE 4 MG: 4 TABLET ORAL at 15:36

## 2024-05-16 SDOH — ECONOMIC STABILITY: INCOME INSECURITY: IN THE PAST 12 MONTHS, HAS THE ELECTRIC, GAS, OIL, OR WATER COMPANY THREATENED TO SHUT OFF SERVICE IN YOUR HOME?: NO

## 2024-05-16 SDOH — SOCIAL STABILITY: SOCIAL INSECURITY: HOW OFTEN DOES ANYONE, INCLUDING FAMILY AND FRIENDS, PHYSICALLY HURT YOU?: NEVER

## 2024-05-16 SDOH — ECONOMIC STABILITY: FOOD INSECURITY: WITHIN THE PAST 12 MONTHS, THE FOOD YOU BOUGHT JUST DIDN'T LAST AND YOU DIDN'T HAVE MONEY TO GET MORE.: NEVER TRUE

## 2024-05-16 SDOH — ECONOMIC STABILITY: GENERAL: WOULD YOU LIKE HELP WITH ANY OF THE FOLLOWING NEEDS?: I DON'T WANT HELP WITH ANY OF THESE

## 2024-05-16 SDOH — ECONOMIC STABILITY: HOUSING INSECURITY: WHAT IS YOUR LIVING SITUATION TODAY?: ALONE

## 2024-05-16 SDOH — SOCIAL STABILITY: SOCIAL INSECURITY: HOW OFTEN DOES ANYONE, INCLUDING FAMILY AND FRIENDS, SCREAM OR CURSE AT YOU?: NEVER

## 2024-05-16 SDOH — SOCIAL STABILITY: SOCIAL NETWORK: SUPPORT SYSTEMS: FRIENDS

## 2024-05-16 SDOH — ECONOMIC STABILITY: HOUSING INSECURITY: WHAT IS YOUR LIVING SITUATION TODAY?: I HAVE A STEADY PLACE TO LIVE

## 2024-05-16 SDOH — ECONOMIC STABILITY: HOUSING INSECURITY: WHAT IS YOUR LIVING SITUATION TODAY?: HOUSE

## 2024-05-16 SDOH — SOCIAL STABILITY: SOCIAL INSECURITY: HOW OFTEN DOES ANYONE, INCLUDING FAMILY AND FRIENDS, INSULT OR TALK DOWN TO YOU?: NEVER

## 2024-05-16 SDOH — SOCIAL STABILITY: SOCIAL INSECURITY: HOW OFTEN DOES ANYONE, INCLUDING FAMILY AND FRIENDS, THREATEN YOU WITH HARM?: NEVER

## 2024-05-16 SDOH — ECONOMIC STABILITY: HOUSING INSECURITY: DO YOU HAVE PROBLEMS WITH ANY OF THE FOLLOWING?: NONE OF THE ABOVE

## 2024-05-16 ASSESSMENT — LIFESTYLE VARIABLES
OPIATES USE: DENIES
HOW OFTEN DO YOU HAVE A DRINK CONTAINING ALCOHOL: NEVER
HAVE YOU EVER BEEN EXPOSED TO OTHER VERY DISTURBING, TRAUMATIC OR ANXIETY PROVOKING EVENTS IN YOUR LIFETIME?: NO
ADL NEEDS ASSIST: NO
TOBACCO_USE_STATUS_IN_THE_LAST_30_DAYS: NO TOBACCO USED IN THE LAST 30 DAYS
RECENT DECLINE IN ADLS: NO
ADL SCORE: 12
AUDIT-C TOTAL SCORE: 0
HOW OFTEN DO YOU HAVE 6 OR MORE DRINKS ON ONE OCCASION: NEVER
SHORT OF BREATH OR FATIGUE WITH ADLS: NO
AMPHETAMINES/STIMULANTS USE: DENIES
ARE YOU BLIND OR DO YOU HAVE SERIOUS DIFFICULTY SEEING, EVEN WHEN WEARING GLASSES: NO
HAVE YOU EVER BEEN VERBALLY, EMOTIONALLY, PHYSICALLY OR SEXUALLY ABUSED, OR ABUSED VIA SOCIAL MEDIA?: NO
E-CIGARETTE_USE: NO E-CIGARETTES USE IN THE LAST 30 DAYS
VOLATILE SOLVENTS/INHALANTS USE: DENIES
CAFFEINE: DENIES
ALCOHOL_USE_STATUS: NO OR LOW RISK WITH VALIDATED TOOL
ADL BEFORE ADMISSION: INDEPENDENT
COCAINE USE: DENIES
HOW MANY STANDARD DRINKS CONTAINING ALCOHOL DO YOU HAVE ON A TYPICAL DAY: 0,1 OR 2
ARE YOU DEAF OR DO YOU HAVE SERIOUS DIFFICULTY  HEARING: NO
HOW MANY CIGARETTES HAVE YOU SMOKED PER DAY ON AVERAGE?: DENIES

## 2024-05-16 ASSESSMENT — PATIENT HEALTH QUESTIONNAIRE - PHQ9
4. FEELING TIRED OR HAVING LITTLE ENERGY: NEARLY EVERY DAY
8. MOVING OR SPEAKING SO SLOWLY THAT OTHER PEOPLE COULD HAVE NOTICED. OR THE OPPOSITE, BEING SO FIGETY OR RESTLESS THAT YOU HAVE BEEN MOVING AROUND A LOT MORE THAN USUAL: NOT AT ALL
1. LITTLE INTEREST OR PLEASURE IN DOING THINGS: MORE THAN HALF THE DAYS
CLINICAL INTERPRETATION OF PHQ2 SCORE: FURTHER SCREENING NEEDED
1. LITTLE INTEREST OR PLEASURE IN DOING THINGS: NEARLY EVERY DAY
CLINICAL INTERPRETATION OF PHQ9 SCORE: MODERATE DEPRESSION
3. TROUBLE FALLING OR STAYING ASLEEP OR SLEEPING TOO MUCH: NEARLY EVERY DAY
SUM OF ALL RESPONSES TO PHQ9 QUESTIONS 1 AND 2: 5
5. POOR APPETITE OR OVEREATING: NOT AT ALL
IS PATIENT ABLE TO COMPLETE PHQ2 OR PHQ9: YES
6. FEELING BAD ABOUT YOURSELF - OR THAT YOU ARE A FAILURE OR HAVE LET YOURSELF OR YOUR FAMILY DOWN: SEVERAL DAYS
SUM OF ALL RESPONSES TO PHQ QUESTIONS 1-9: 14
SUM OF ALL RESPONSES TO PHQ9 QUESTIONS 1 AND 2: 5
2. FEELING DOWN, DEPRESSED OR HOPELESS: MORE THAN HALF THE DAYS
SUM OF ALL RESPONSES TO PHQ9 QUESTIONS 1 AND 2: 5
9. THOUGHTS THAT YOU WOULD BE BETTER OFF DEAD, OR OF HURTING YOURSELF: SEVERAL DAYS
CLINICAL INTERPRETATION OF PHQ2 SCORE: FURTHER SCREENING NEEDED
IS PATIENT ABLE TO COMPLETE PHQ2 OR PHQ9: YES
2. FEELING DOWN, DEPRESSED OR HOPELESS: NEARLY EVERY DAY
SUM OF ALL RESPONSES TO PHQ9 QUESTIONS 1 AND 2: 5
7. TROUBLE CONCENTRATING ON THINGS, SUCH AS READING THE NEWSPAPER OR WATCHING TELEVISION: SEVERAL DAYS

## 2024-05-16 ASSESSMENT — PAIN SCALES - GENERAL
PAINLEVEL_OUTOF10: 3
PAINLEVEL_OUTOF10: 8
PAINLEVEL_OUTOF10: 3
PAINLEVEL_OUTOF10: 0
PAINLEVEL_OUTOF10: 3

## 2024-05-17 LAB
ANION GAP SERPL CALC-SCNC: 7 MMOL/L (ref 7–19)
BASOPHILS # BLD: 0 K/MCL (ref 0–0.3)
BASOPHILS NFR BLD: 0 %
BUN SERPL-MCNC: 17 MG/DL (ref 6–20)
BUN/CREAT SERPL: 16 (ref 7–25)
CALCIUM SERPL-MCNC: 10 MG/DL (ref 8.4–10.2)
CHLORIDE SERPL-SCNC: 103 MMOL/L (ref 97–110)
CHOLEST SERPL-MCNC: 138 MG/DL
CHOLEST/HDLC SERPL: 2.5 {RATIO}
CO2 SERPL-SCNC: 31 MMOL/L (ref 21–32)
CREAT SERPL-MCNC: 1.08 MG/DL (ref 0.67–1.17)
DEPRECATED RDW RBC: 50.2 FL (ref 39–50)
EGFRCR SERPLBLD CKD-EPI 2021: 77 ML/MIN/{1.73_M2}
EOSINOPHIL # BLD: 0.1 K/MCL (ref 0–0.5)
EOSINOPHIL NFR BLD: 1 %
ERYTHROCYTE [DISTWIDTH] IN BLOOD: 14.3 % (ref 11–15)
FASTING DURATION TIME PATIENT: ABNORMAL H
GLUCOSE SERPL-MCNC: 100 MG/DL (ref 70–99)
HBA1C MFR BLD: 5.5 % (ref 4.5–5.6)
HCT VFR BLD CALC: 53.8 % (ref 39–51)
HDLC SERPL-MCNC: 55 MG/DL
HGB BLD-MCNC: 17.5 G/DL (ref 13–17)
IMM GRANULOCYTES # BLD AUTO: 0 K/MCL (ref 0–0.2)
IMM GRANULOCYTES # BLD: 0 %
LDLC SERPL CALC-MCNC: 59 MG/DL
LYMPHOCYTES # BLD: 2.5 K/MCL (ref 1–4)
LYMPHOCYTES NFR BLD: 31 %
MCH RBC QN AUTO: 30.8 PG (ref 26–34)
MCHC RBC AUTO-ENTMCNC: 32.5 G/DL (ref 32–36.5)
MCV RBC AUTO: 94.6 FL (ref 78–100)
MONOCYTES # BLD: 0.6 K/MCL (ref 0.3–0.9)
MONOCYTES NFR BLD: 7 %
NEUTROPHILS # BLD: 4.8 K/MCL (ref 1.8–7.7)
NEUTROPHILS NFR BLD: 61 %
NONHDLC SERPL-MCNC: 83 MG/DL
NRBC BLD MANUAL-RTO: 0 /100 WBC
PLATELET # BLD AUTO: 196 K/MCL (ref 140–450)
POTASSIUM SERPL-SCNC: 4.3 MMOL/L (ref 3.4–5.1)
RBC # BLD: 5.69 MIL/MCL (ref 4.5–5.9)
SODIUM SERPL-SCNC: 137 MMOL/L (ref 135–145)
TRIGL SERPL-MCNC: 121 MG/DL
TSH SERPL-ACNC: 3.4 MCUNITS/ML (ref 0.35–5)
WBC # BLD: 8 K/MCL (ref 4.2–11)

## 2024-05-17 PROCEDURE — 80061 LIPID PANEL: CPT | Performed by: PATHOLOGY

## 2024-05-17 PROCEDURE — 90792 PSYCH DIAG EVAL W/MED SRVCS: CPT | Performed by: PATHOLOGY

## 2024-05-17 PROCEDURE — 84443 ASSAY THYROID STIM HORMONE: CPT | Performed by: PATHOLOGY

## 2024-05-17 PROCEDURE — 85025 COMPLETE CBC W/AUTO DIFF WBC: CPT | Performed by: PATHOLOGY

## 2024-05-17 PROCEDURE — 10002803 HB RX 637: Performed by: INTERNAL MEDICINE

## 2024-05-17 PROCEDURE — 10002801 HB RX 250 W/O HCPCS: Performed by: INTERNAL MEDICINE

## 2024-05-17 PROCEDURE — 10002803 HB RX 637: Performed by: PATHOLOGY

## 2024-05-17 PROCEDURE — 36415 COLL VENOUS BLD VENIPUNCTURE: CPT | Performed by: PATHOLOGY

## 2024-05-17 PROCEDURE — 10004577 HB ROOM CHARGE PSYCH

## 2024-05-17 PROCEDURE — 83036 HEMOGLOBIN GLYCOSYLATED A1C: CPT | Performed by: PATHOLOGY

## 2024-05-17 PROCEDURE — 80048 BASIC METABOLIC PNL TOTAL CA: CPT | Performed by: PATHOLOGY

## 2024-05-17 RX ADMIN — DULOXETINE HYDROCHLORIDE 30 MG: 30 CAPSULE, DELAYED RELEASE ORAL at 08:05

## 2024-05-17 RX ADMIN — TAMSULOSIN HYDROCHLORIDE 0.4 MG: 0.4 CAPSULE ORAL at 08:05

## 2024-05-17 RX ADMIN — HYDROCODONE BITARTRATE AND ACETAMINOPHEN 1 TABLET: 5; 325 TABLET ORAL at 13:16

## 2024-05-17 RX ADMIN — Medication 100 MG: at 08:05

## 2024-05-17 RX ADMIN — CLOTRIMAZOLE AND BETAMETHASONE DIPROPIONATE: 10; .64 CREAM TOPICAL at 08:05

## 2024-05-17 RX ADMIN — HYDROXYZINE HYDROCHLORIDE 50 MG: 25 TABLET ORAL at 14:03

## 2024-05-17 RX ADMIN — HYDROCODONE BITARTRATE AND ACETAMINOPHEN 1 TABLET: 5; 325 TABLET ORAL at 21:24

## 2024-05-17 RX ADMIN — LOSARTAN POTASSIUM 50 MG: 50 TABLET, FILM COATED ORAL at 08:05

## 2024-05-17 RX ADMIN — OXYCODONE HYDROCHLORIDE AND ACETAMINOPHEN 500 MG: 500 TABLET ORAL at 08:05

## 2024-05-17 RX ADMIN — ROSUVASTATIN CALCIUM 20 MG: 20 TABLET, FILM COATED ORAL at 08:05

## 2024-05-17 RX ADMIN — HYDROCODONE BITARTRATE AND ACETAMINOPHEN 1 TABLET: 5; 325 TABLET ORAL at 01:49

## 2024-05-17 RX ADMIN — CLOPIDOGREL BISULFATE 75 MG: 75 TABLET, FILM COATED ORAL at 08:04

## 2024-05-17 RX ADMIN — DULOXETINE HYDROCHLORIDE 30 MG: 30 CAPSULE, DELAYED RELEASE ORAL at 21:25

## 2024-05-17 RX ADMIN — HYDROXYZINE HYDROCHLORIDE 50 MG: 25 TABLET ORAL at 21:25

## 2024-05-17 RX ADMIN — CLONAZEPAM 4 MG: 1 TABLET ORAL at 21:25

## 2024-05-17 SDOH — SOCIAL STABILITY: SOCIAL INSECURITY: HOW OFTEN DOES ANYONE, INCLUDING FAMILY AND FRIENDS, THREATEN YOU WITH HARM?: NEVER

## 2024-05-17 SDOH — SOCIAL STABILITY: SOCIAL INSECURITY: HOW OFTEN DOES ANYONE, INCLUDING FAMILY AND FRIENDS, INSULT OR TALK DOWN TO YOU?: NEVER

## 2024-05-17 SDOH — SOCIAL STABILITY: SOCIAL INSECURITY: HOW OFTEN DOES ANYONE, INCLUDING FAMILY AND FRIENDS, PHYSICALLY HURT YOU?: NEVER

## 2024-05-17 SDOH — SOCIAL STABILITY: SOCIAL INSECURITY: HOW OFTEN DOES ANYONE, INCLUDING FAMILY AND FRIENDS, SCREAM OR CURSE AT YOU?: NEVER

## 2024-05-17 ASSESSMENT — PAIN SCALES - GENERAL
PAINLEVEL_OUTOF10: 5
PAINLEVEL_OUTOF10: 7
PAINLEVEL_OUTOF10: 0
PAINLEVEL_OUTOF10: 8
PAINLEVEL_OUTOF10: 0

## 2024-05-18 VITALS
HEART RATE: 99 BPM | DIASTOLIC BLOOD PRESSURE: 82 MMHG | WEIGHT: 234.79 LBS | BODY MASS INDEX: 33.61 KG/M2 | SYSTOLIC BLOOD PRESSURE: 119 MMHG | TEMPERATURE: 96.8 F | OXYGEN SATURATION: 95 % | HEIGHT: 70 IN | RESPIRATION RATE: 18 BRPM

## 2024-05-18 PROCEDURE — 10002803 HB RX 637: Performed by: STUDENT IN AN ORGANIZED HEALTH CARE EDUCATION/TRAINING PROGRAM

## 2024-05-18 PROCEDURE — 10002803 HB RX 637: Performed by: INTERNAL MEDICINE

## 2024-05-18 PROCEDURE — 10002803 HB RX 637: Performed by: PATHOLOGY

## 2024-05-18 PROCEDURE — 99233 SBSQ HOSP IP/OBS HIGH 50: CPT | Performed by: PSYCHIATRY & NEUROLOGY

## 2024-05-18 PROCEDURE — 10004577 HB ROOM CHARGE PSYCH

## 2024-05-18 PROCEDURE — 10002803 HB RX 637: Performed by: PSYCHIATRY & NEUROLOGY

## 2024-05-18 RX ORDER — FLUTICASONE PROPIONATE 50 MCG
2 SPRAY, SUSPENSION (ML) NASAL AT BEDTIME
Status: DISCONTINUED | OUTPATIENT
Start: 2024-05-19 | End: 2024-05-24 | Stop reason: HOSPADM

## 2024-05-18 RX ORDER — FLUTICASONE PROPIONATE 50 MCG
2 SPRAY, SUSPENSION (ML) NASAL DAILY
Status: DISCONTINUED | OUTPATIENT
Start: 2024-05-18 | End: 2024-05-18

## 2024-05-18 RX ORDER — CLONAZEPAM 1 MG/1
3 TABLET ORAL NIGHTLY
Status: DISCONTINUED | OUTPATIENT
Start: 2024-05-18 | End: 2024-05-24 | Stop reason: HOSPADM

## 2024-05-18 RX ADMIN — TRAZODONE HYDROCHLORIDE 50 MG: 50 TABLET ORAL at 22:31

## 2024-05-18 RX ADMIN — CLOPIDOGREL BISULFATE 75 MG: 75 TABLET, FILM COATED ORAL at 08:56

## 2024-05-18 RX ADMIN — TAMSULOSIN HYDROCHLORIDE 0.4 MG: 0.4 CAPSULE ORAL at 08:57

## 2024-05-18 RX ADMIN — HYDROXYZINE HYDROCHLORIDE 50 MG: 25 TABLET ORAL at 22:31

## 2024-05-18 RX ADMIN — DULOXETINE HYDROCHLORIDE 30 MG: 30 CAPSULE, DELAYED RELEASE ORAL at 22:33

## 2024-05-18 RX ADMIN — ROSUVASTATIN CALCIUM 20 MG: 20 TABLET, FILM COATED ORAL at 08:58

## 2024-05-18 RX ADMIN — LOSARTAN POTASSIUM 50 MG: 50 TABLET, FILM COATED ORAL at 08:57

## 2024-05-18 RX ADMIN — CLONAZEPAM 3 MG: 1 TABLET ORAL at 22:32

## 2024-05-18 RX ADMIN — HYDROXYZINE HYDROCHLORIDE 50 MG: 25 TABLET ORAL at 15:30

## 2024-05-18 RX ADMIN — Medication 100 MG: at 08:58

## 2024-05-18 RX ADMIN — HYDROCODONE BITARTRATE AND ACETAMINOPHEN 1 TABLET: 5; 325 TABLET ORAL at 08:58

## 2024-05-18 RX ADMIN — CLOTRIMAZOLE AND BETAMETHASONE DIPROPIONATE: 10; .64 CREAM TOPICAL at 08:56

## 2024-05-18 RX ADMIN — OXYCODONE HYDROCHLORIDE AND ACETAMINOPHEN 500 MG: 500 TABLET ORAL at 08:56

## 2024-05-18 RX ADMIN — DULOXETINE HYDROCHLORIDE 30 MG: 30 CAPSULE, DELAYED RELEASE ORAL at 08:57

## 2024-05-18 RX ADMIN — HYDROCODONE BITARTRATE AND ACETAMINOPHEN 1 TABLET: 5; 325 TABLET ORAL at 22:32

## 2024-05-18 ASSESSMENT — PAIN SCALES - GENERAL
PAINLEVEL_OUTOF10: 3
PAINLEVEL_OUTOF10: 7
PAINLEVEL_OUTOF10: 5
PAINLEVEL_OUTOF10: 3

## 2024-05-19 PROCEDURE — 10004577 HB ROOM CHARGE PSYCH

## 2024-05-19 PROCEDURE — 10002803 HB RX 637: Performed by: PSYCHIATRY & NEUROLOGY

## 2024-05-19 PROCEDURE — 99233 SBSQ HOSP IP/OBS HIGH 50: CPT | Performed by: PSYCHIATRY & NEUROLOGY

## 2024-05-19 PROCEDURE — 10002803 HB RX 637: Performed by: PATHOLOGY

## 2024-05-19 PROCEDURE — 10002803 HB RX 637: Performed by: INTERNAL MEDICINE

## 2024-05-19 RX ORDER — TRAZODONE HYDROCHLORIDE 100 MG/1
100 TABLET ORAL NIGHTLY PRN
Status: DISCONTINUED | OUTPATIENT
Start: 2024-05-19 | End: 2024-05-24 | Stop reason: HOSPADM

## 2024-05-19 RX ORDER — RISPERIDONE 1 MG/1
1 TABLET ORAL NIGHTLY
Status: DISCONTINUED | OUTPATIENT
Start: 2024-05-19 | End: 2024-05-24 | Stop reason: HOSPADM

## 2024-05-19 RX ADMIN — CLONAZEPAM 3 MG: 1 TABLET ORAL at 22:52

## 2024-05-19 RX ADMIN — Medication 100 MG: at 08:57

## 2024-05-19 RX ADMIN — HYDROXYZINE HYDROCHLORIDE 50 MG: 25 TABLET ORAL at 14:32

## 2024-05-19 RX ADMIN — HYDROXYZINE HYDROCHLORIDE 50 MG: 25 TABLET ORAL at 22:52

## 2024-05-19 RX ADMIN — CLOTRIMAZOLE AND BETAMETHASONE DIPROPIONATE: 10; .64 CREAM TOPICAL at 22:52

## 2024-05-19 RX ADMIN — DULOXETINE HYDROCHLORIDE 30 MG: 30 CAPSULE, DELAYED RELEASE ORAL at 08:56

## 2024-05-19 RX ADMIN — ROSUVASTATIN CALCIUM 20 MG: 20 TABLET, FILM COATED ORAL at 08:57

## 2024-05-19 RX ADMIN — Medication 2 SPRAY: at 22:52

## 2024-05-19 RX ADMIN — CLOPIDOGREL BISULFATE 75 MG: 75 TABLET, FILM COATED ORAL at 08:56

## 2024-05-19 RX ADMIN — RISPERIDONE 1 MG: 1 TABLET, FILM COATED ORAL at 22:51

## 2024-05-19 RX ADMIN — LOSARTAN POTASSIUM 50 MG: 50 TABLET, FILM COATED ORAL at 08:56

## 2024-05-19 RX ADMIN — OXYCODONE HYDROCHLORIDE AND ACETAMINOPHEN 500 MG: 500 TABLET ORAL at 08:55

## 2024-05-19 RX ADMIN — HYDROCODONE BITARTRATE AND ACETAMINOPHEN 1 TABLET: 5; 325 TABLET ORAL at 22:52

## 2024-05-19 RX ADMIN — HYDROXYZINE HYDROCHLORIDE 50 MG: 25 TABLET ORAL at 09:54

## 2024-05-19 RX ADMIN — TRAZODONE HYDROCHLORIDE 100 MG: 100 TABLET ORAL at 22:51

## 2024-05-19 RX ADMIN — DULOXETINE HYDROCHLORIDE 30 MG: 30 CAPSULE, DELAYED RELEASE ORAL at 22:51

## 2024-05-19 RX ADMIN — TAMSULOSIN HYDROCHLORIDE 0.4 MG: 0.4 CAPSULE ORAL at 08:57

## 2024-05-19 RX ADMIN — HYDROCODONE BITARTRATE AND ACETAMINOPHEN 1 TABLET: 5; 325 TABLET ORAL at 09:54

## 2024-05-19 ASSESSMENT — PAIN SCALES - GENERAL
PAINLEVEL_OUTOF10: 0
PAINLEVEL_OUTOF10: 5
PAINLEVEL_OUTOF10: 2
PAINLEVEL_OUTOF10: 0
PAINLEVEL_OUTOF10: 5

## 2024-05-20 PROCEDURE — 99232 SBSQ HOSP IP/OBS MODERATE 35: CPT | Performed by: PATHOLOGY

## 2024-05-20 PROCEDURE — 10004577 HB ROOM CHARGE PSYCH

## 2024-05-20 PROCEDURE — 10002803 HB RX 637: Performed by: INTERNAL MEDICINE

## 2024-05-20 PROCEDURE — 10002803 HB RX 637: Performed by: PSYCHIATRY & NEUROLOGY

## 2024-05-20 PROCEDURE — 10002803 HB RX 637: Performed by: PATHOLOGY

## 2024-05-20 RX ADMIN — Medication 1.25 MG: at 08:53

## 2024-05-20 RX ADMIN — ROSUVASTATIN CALCIUM 20 MG: 20 TABLET, FILM COATED ORAL at 08:54

## 2024-05-20 RX ADMIN — CLOTRIMAZOLE AND BETAMETHASONE DIPROPIONATE 1 APPLICATION: 10; .64 CREAM TOPICAL at 08:58

## 2024-05-20 RX ADMIN — Medication 2 SPRAY: at 22:23

## 2024-05-20 RX ADMIN — CLONAZEPAM 3 MG: 1 TABLET ORAL at 22:19

## 2024-05-20 RX ADMIN — DULOXETINE HYDROCHLORIDE 30 MG: 30 CAPSULE, DELAYED RELEASE ORAL at 08:53

## 2024-05-20 RX ADMIN — OXYCODONE HYDROCHLORIDE AND ACETAMINOPHEN 500 MG: 500 TABLET ORAL at 08:53

## 2024-05-20 RX ADMIN — RISPERIDONE 1 MG: 1 TABLET, FILM COATED ORAL at 22:23

## 2024-05-20 RX ADMIN — DULOXETINE HYDROCHLORIDE 30 MG: 30 CAPSULE, DELAYED RELEASE ORAL at 22:23

## 2024-05-20 RX ADMIN — CLOTRIMAZOLE AND BETAMETHASONE DIPROPIONATE: 10; .64 CREAM TOPICAL at 22:22

## 2024-05-20 RX ADMIN — CLOPIDOGREL BISULFATE 75 MG: 75 TABLET, FILM COATED ORAL at 08:53

## 2024-05-20 RX ADMIN — LOSARTAN POTASSIUM 50 MG: 50 TABLET, FILM COATED ORAL at 08:53

## 2024-05-20 RX ADMIN — HYDROCODONE BITARTRATE AND ACETAMINOPHEN 1 TABLET: 5; 325 TABLET ORAL at 22:18

## 2024-05-20 RX ADMIN — TAMSULOSIN HYDROCHLORIDE 0.4 MG: 0.4 CAPSULE ORAL at 08:53

## 2024-05-20 RX ADMIN — HYDROXYZINE HYDROCHLORIDE 50 MG: 25 TABLET ORAL at 14:55

## 2024-05-20 RX ADMIN — TRAZODONE HYDROCHLORIDE 100 MG: 100 TABLET ORAL at 22:15

## 2024-05-20 RX ADMIN — HYDROXYZINE HYDROCHLORIDE 50 MG: 25 TABLET ORAL at 22:16

## 2024-05-20 RX ADMIN — Medication 100 MG: at 08:53

## 2024-05-20 SDOH — SOCIAL STABILITY: SOCIAL INSECURITY: HOW OFTEN DOES ANYONE, INCLUDING FAMILY AND FRIENDS, SCREAM OR CURSE AT YOU?: NEVER

## 2024-05-20 SDOH — SOCIAL STABILITY: SOCIAL INSECURITY: HOW OFTEN DOES ANYONE, INCLUDING FAMILY AND FRIENDS, INSULT OR TALK DOWN TO YOU?: NEVER

## 2024-05-20 SDOH — SOCIAL STABILITY: SOCIAL INSECURITY: HOW OFTEN DOES ANYONE, INCLUDING FAMILY AND FRIENDS, PHYSICALLY HURT YOU?: NEVER

## 2024-05-20 SDOH — SOCIAL STABILITY: SOCIAL INSECURITY: HOW OFTEN DOES ANYONE, INCLUDING FAMILY AND FRIENDS, THREATEN YOU WITH HARM?: NEVER

## 2024-05-20 ASSESSMENT — PATIENT HEALTH QUESTIONNAIRE - PHQ9
CLINICAL INTERPRETATION OF PHQ9 SCORE: MODERATELY SEVERE DEPRESSION
CLINICAL INTERPRETATION OF PHQ2 SCORE: FURTHER SCREENING NEEDED
SUM OF ALL RESPONSES TO PHQ QUESTIONS 1-9: 16
IS PATIENT ABLE TO COMPLETE PHQ2 OR PHQ9: YES
SUM OF ALL RESPONSES TO PHQ9 QUESTIONS 1 AND 2: 6

## 2024-05-20 ASSESSMENT — PAIN SCALES - GENERAL
PAINLEVEL_OUTOF10: 0
PAINLEVEL_OUTOF10: 7
PAINLEVEL_OUTOF10: 7

## 2024-05-20 ASSESSMENT — PAIN SCALES - WONG BAKER: WONGBAKER_NUMERICALRESPONSE: 7

## 2024-05-21 PROCEDURE — 10002803 HB RX 637: Performed by: PSYCHIATRY & NEUROLOGY

## 2024-05-21 PROCEDURE — 99232 SBSQ HOSP IP/OBS MODERATE 35: CPT | Performed by: PATHOLOGY

## 2024-05-21 PROCEDURE — 10004577 HB ROOM CHARGE PSYCH

## 2024-05-21 PROCEDURE — 10002803 HB RX 637: Performed by: PATHOLOGY

## 2024-05-21 PROCEDURE — 10002803 HB RX 637: Performed by: INTERNAL MEDICINE

## 2024-05-21 RX ADMIN — Medication 100 MG: at 08:43

## 2024-05-21 RX ADMIN — TAMSULOSIN HYDROCHLORIDE 0.4 MG: 0.4 CAPSULE ORAL at 08:43

## 2024-05-21 RX ADMIN — OXYCODONE HYDROCHLORIDE AND ACETAMINOPHEN 500 MG: 500 TABLET ORAL at 08:43

## 2024-05-21 RX ADMIN — Medication 2 SPRAY: at 22:40

## 2024-05-21 RX ADMIN — HYDROCODONE BITARTRATE AND ACETAMINOPHEN 1 TABLET: 5; 325 TABLET ORAL at 22:41

## 2024-05-21 RX ADMIN — HYDROXYZINE HYDROCHLORIDE 50 MG: 25 TABLET ORAL at 22:41

## 2024-05-21 RX ADMIN — CLOTRIMAZOLE AND BETAMETHASONE DIPROPIONATE: 10; .64 CREAM TOPICAL at 22:40

## 2024-05-21 RX ADMIN — RISPERIDONE 1 MG: 1 TABLET, FILM COATED ORAL at 22:45

## 2024-05-21 RX ADMIN — LOSARTAN POTASSIUM 50 MG: 50 TABLET, FILM COATED ORAL at 08:43

## 2024-05-21 RX ADMIN — DULOXETINE HYDROCHLORIDE 30 MG: 30 CAPSULE, DELAYED RELEASE ORAL at 08:43

## 2024-05-21 RX ADMIN — CLONAZEPAM 3 MG: 1 TABLET ORAL at 22:42

## 2024-05-21 RX ADMIN — TRAZODONE HYDROCHLORIDE 100 MG: 100 TABLET ORAL at 22:41

## 2024-05-21 RX ADMIN — ROSUVASTATIN CALCIUM 20 MG: 20 TABLET, FILM COATED ORAL at 08:43

## 2024-05-21 RX ADMIN — DULOXETINE HYDROCHLORIDE 30 MG: 30 CAPSULE, DELAYED RELEASE ORAL at 22:41

## 2024-05-21 RX ADMIN — CLOPIDOGREL BISULFATE 75 MG: 75 TABLET, FILM COATED ORAL at 08:43

## 2024-05-21 RX ADMIN — HYDROXYZINE HYDROCHLORIDE 50 MG: 25 TABLET ORAL at 16:25

## 2024-05-21 ASSESSMENT — PAIN SCALES - GENERAL: PAINLEVEL_OUTOF10: 0

## 2024-05-22 PROCEDURE — 99232 SBSQ HOSP IP/OBS MODERATE 35: CPT | Performed by: PATHOLOGY

## 2024-05-22 PROCEDURE — 10002803 HB RX 637: Performed by: PATHOLOGY

## 2024-05-22 PROCEDURE — 10004577 HB ROOM CHARGE PSYCH

## 2024-05-22 PROCEDURE — 10002803 HB RX 637: Performed by: PSYCHIATRY & NEUROLOGY

## 2024-05-22 PROCEDURE — 10002803 HB RX 637: Performed by: INTERNAL MEDICINE

## 2024-05-22 RX ADMIN — HYDROXYZINE HYDROCHLORIDE 50 MG: 25 TABLET ORAL at 22:41

## 2024-05-22 RX ADMIN — CLONAZEPAM 3 MG: 1 TABLET ORAL at 22:35

## 2024-05-22 RX ADMIN — HYDROXYZINE HYDROCHLORIDE 50 MG: 25 TABLET ORAL at 16:41

## 2024-05-22 RX ADMIN — RISPERIDONE 1 MG: 1 TABLET, FILM COATED ORAL at 22:35

## 2024-05-22 RX ADMIN — Medication 100 MG: at 08:50

## 2024-05-22 RX ADMIN — HYDROCODONE BITARTRATE AND ACETAMINOPHEN 1 TABLET: 5; 325 TABLET ORAL at 09:46

## 2024-05-22 RX ADMIN — OXYCODONE HYDROCHLORIDE AND ACETAMINOPHEN 500 MG: 500 TABLET ORAL at 08:50

## 2024-05-22 RX ADMIN — TAMSULOSIN HYDROCHLORIDE 0.4 MG: 0.4 CAPSULE ORAL at 08:50

## 2024-05-22 RX ADMIN — LOSARTAN POTASSIUM 50 MG: 50 TABLET, FILM COATED ORAL at 08:50

## 2024-05-22 RX ADMIN — DULOXETINE HYDROCHLORIDE 30 MG: 30 CAPSULE, DELAYED RELEASE ORAL at 08:50

## 2024-05-22 RX ADMIN — HYDROCODONE BITARTRATE AND ACETAMINOPHEN 1 TABLET: 5; 325 TABLET ORAL at 22:41

## 2024-05-22 RX ADMIN — DULOXETINE HYDROCHLORIDE 30 MG: 30 CAPSULE, DELAYED RELEASE ORAL at 22:35

## 2024-05-22 RX ADMIN — CLOPIDOGREL BISULFATE 75 MG: 75 TABLET, FILM COATED ORAL at 08:50

## 2024-05-22 RX ADMIN — CLOTRIMAZOLE AND BETAMETHASONE DIPROPIONATE: 10; .64 CREAM TOPICAL at 08:50

## 2024-05-22 RX ADMIN — ROSUVASTATIN CALCIUM 20 MG: 20 TABLET, FILM COATED ORAL at 08:50

## 2024-05-22 RX ADMIN — Medication 2 SPRAY: at 22:36

## 2024-05-22 ASSESSMENT — PAIN SCALES - GENERAL
PAINLEVEL_OUTOF10: 0
PAINLEVEL_OUTOF10: 5
PAINLEVEL_OUTOF10: 7
PAINLEVEL_OUTOF10: 5

## 2024-05-23 PROCEDURE — 99233 SBSQ HOSP IP/OBS HIGH 50: CPT | Performed by: PATHOLOGY

## 2024-05-23 PROCEDURE — 10002803 HB RX 637: Performed by: INTERNAL MEDICINE

## 2024-05-23 PROCEDURE — 10002803 HB RX 637: Performed by: PATHOLOGY

## 2024-05-23 PROCEDURE — 10004577 HB ROOM CHARGE PSYCH

## 2024-05-23 PROCEDURE — 10002803 HB RX 637: Performed by: PSYCHIATRY & NEUROLOGY

## 2024-05-23 RX ADMIN — DULOXETINE HYDROCHLORIDE 30 MG: 30 CAPSULE, DELAYED RELEASE ORAL at 08:44

## 2024-05-23 RX ADMIN — TAMSULOSIN HYDROCHLORIDE 0.4 MG: 0.4 CAPSULE ORAL at 08:44

## 2024-05-23 RX ADMIN — LOSARTAN POTASSIUM 50 MG: 50 TABLET, FILM COATED ORAL at 08:44

## 2024-05-23 RX ADMIN — Medication 100 MG: at 08:43

## 2024-05-23 RX ADMIN — OXYCODONE HYDROCHLORIDE AND ACETAMINOPHEN 500 MG: 500 TABLET ORAL at 08:44

## 2024-05-23 RX ADMIN — RISPERIDONE 1 MG: 1 TABLET, FILM COATED ORAL at 22:39

## 2024-05-23 RX ADMIN — CLOPIDOGREL BISULFATE 75 MG: 75 TABLET, FILM COATED ORAL at 08:44

## 2024-05-23 RX ADMIN — Medication 2 SPRAY: at 22:39

## 2024-05-23 RX ADMIN — CLONAZEPAM 3 MG: 1 TABLET ORAL at 22:39

## 2024-05-23 RX ADMIN — HYDROXYZINE HYDROCHLORIDE 50 MG: 25 TABLET ORAL at 22:39

## 2024-05-23 RX ADMIN — ROSUVASTATIN CALCIUM 20 MG: 20 TABLET, FILM COATED ORAL at 08:44

## 2024-05-23 RX ADMIN — CLOTRIMAZOLE AND BETAMETHASONE DIPROPIONATE: 10; .64 CREAM TOPICAL at 08:46

## 2024-05-23 RX ADMIN — DULOXETINE HYDROCHLORIDE 30 MG: 30 CAPSULE, DELAYED RELEASE ORAL at 22:39

## 2024-05-23 RX ADMIN — HYDROCODONE BITARTRATE AND ACETAMINOPHEN 1 TABLET: 5; 325 TABLET ORAL at 23:18

## 2024-05-23 RX ADMIN — TRAZODONE HYDROCHLORIDE 100 MG: 100 TABLET ORAL at 22:39

## 2024-05-23 ASSESSMENT — PAIN SCALES - GENERAL
PAINLEVEL_OUTOF10: 4
PAINLEVEL_OUTOF10: 0

## 2024-05-24 VITALS
WEIGHT: 233.03 LBS | TEMPERATURE: 97.3 F | RESPIRATION RATE: 16 BRPM | OXYGEN SATURATION: 96 % | DIASTOLIC BLOOD PRESSURE: 88 MMHG | BODY MASS INDEX: 33.36 KG/M2 | HEART RATE: 76 BPM | SYSTOLIC BLOOD PRESSURE: 129 MMHG | HEIGHT: 70 IN

## 2024-05-24 PROCEDURE — 10002803 HB RX 637: Performed by: INTERNAL MEDICINE

## 2024-05-24 PROCEDURE — 10002803 HB RX 637: Performed by: PATHOLOGY

## 2024-05-24 RX ORDER — CLONAZEPAM 1 MG/1
3 TABLET ORAL NIGHTLY
Qty: 90 TABLET | Refills: 0 | Status: SHIPPED | OUTPATIENT
Start: 2024-05-24

## 2024-05-24 RX ORDER — TRAZODONE HYDROCHLORIDE 100 MG/1
100 TABLET ORAL NIGHTLY PRN
Qty: 30 TABLET | Refills: 0 | Status: SHIPPED | OUTPATIENT
Start: 2024-05-24

## 2024-05-24 RX ORDER — DULOXETIN HYDROCHLORIDE 30 MG/1
30 CAPSULE, DELAYED RELEASE ORAL 2 TIMES DAILY
Qty: 60 CAPSULE | Refills: 0 | Status: SHIPPED | OUTPATIENT
Start: 2024-05-24

## 2024-05-24 RX ORDER — HYDROXYZINE 50 MG/1
50 TABLET, FILM COATED ORAL 2 TIMES DAILY PRN
Qty: 60 TABLET | Refills: 0 | Status: SHIPPED | OUTPATIENT
Start: 2024-05-24

## 2024-05-24 RX ADMIN — LOSARTAN POTASSIUM 50 MG: 50 TABLET, FILM COATED ORAL at 08:50

## 2024-05-24 RX ADMIN — ROSUVASTATIN CALCIUM 20 MG: 20 TABLET, FILM COATED ORAL at 08:49

## 2024-05-24 RX ADMIN — Medication 100 MG: at 08:50

## 2024-05-24 RX ADMIN — CLOPIDOGREL BISULFATE 75 MG: 75 TABLET, FILM COATED ORAL at 08:50

## 2024-05-24 RX ADMIN — TAMSULOSIN HYDROCHLORIDE 0.4 MG: 0.4 CAPSULE ORAL at 08:49

## 2024-05-24 RX ADMIN — DULOXETINE HYDROCHLORIDE 30 MG: 30 CAPSULE, DELAYED RELEASE ORAL at 08:50

## 2024-05-24 RX ADMIN — OXYCODONE HYDROCHLORIDE AND ACETAMINOPHEN 500 MG: 500 TABLET ORAL at 08:49

## 2024-05-24 ASSESSMENT — ENCOUNTER SYMPTOMS
GASTROINTESTINAL NEGATIVE: 1
NEUROLOGICAL NEGATIVE: 1
EYES NEGATIVE: 1
DEPRESSION: 1
CONSTITUTIONAL NEGATIVE: 1

## 2024-05-24 ASSESSMENT — PAIN SCALES - GENERAL
PAINLEVEL_OUTOF10: 0
PAINLEVEL_OUTOF10: 0

## 2024-08-21 ENCOUNTER — APPOINTMENT (OUTPATIENT)
Dept: GENERAL RADIOLOGY | Facility: HOSPITAL | Age: 64
End: 2024-08-21
Attending: EMERGENCY MEDICINE
Payer: MEDICARE

## 2024-08-21 ENCOUNTER — HOSPITAL ENCOUNTER (EMERGENCY)
Facility: HOSPITAL | Age: 64
Discharge: HOME | End: 2024-08-21
Attending: EMERGENCY MEDICINE
Payer: MEDICARE

## 2024-08-21 VITALS
DIASTOLIC BLOOD PRESSURE: 82 MMHG | SYSTOLIC BLOOD PRESSURE: 121 MMHG | BODY MASS INDEX: 32.93 KG/M2 | TEMPERATURE: 97 F | RESPIRATION RATE: 16 BRPM | HEART RATE: 101 BPM | HEIGHT: 70 IN | WEIGHT: 230 LBS | OXYGEN SATURATION: 97 %

## 2024-08-21 DIAGNOSIS — Z77.098 EXPOSURE TO CHEMICAL INHALATION: Primary | ICD-10-CM

## 2024-08-21 PROCEDURE — 71045 X-RAY EXAM CHEST 1 VIEW: CPT | Performed by: EMERGENCY MEDICINE

## 2024-08-21 PROCEDURE — 99284 EMERGENCY DEPT VISIT MOD MDM: CPT

## 2024-08-21 PROCEDURE — 94640 AIRWAY INHALATION TREATMENT: CPT

## 2024-08-21 RX ORDER — IPRATROPIUM BROMIDE AND ALBUTEROL SULFATE 2.5; .5 MG/3ML; MG/3ML
3 SOLUTION RESPIRATORY (INHALATION) ONCE
Status: COMPLETED | OUTPATIENT
Start: 2024-08-21 | End: 2024-08-21

## 2024-08-22 NOTE — ED PROVIDER NOTES
Patient Seen in: [facility] Emergency Department      History     Chief Complaint   Patient presents with    Exposure,Chem Occupational     Stated Complaint: chemical exposure inhalation, less than 1 min    Subjective:   HPI    64-year-old male comes to the hospital stating that about an hour ago he was cleaning a toilet that he did not know had a different toilet cleaning agent in it when he poured a second, created a gas that he inhaled and began to get a lot of coughing.  He did have some burning sensation as well that seems was dissipated at this time.  He denies any chest pain.  He has no fevers or chills.  He is complaining of having a cough since then.  Is denying any other complaints at this time.    Objective:   Past Medical History:    Anxiety    Back problem    Calculus of kidney    Coronary atherosclerosis    Depression    Enlarged prostate    High blood pressure    High cholesterol    NIDIA (obstructive sleep apnea)    Other and unspecified hyperlipidemia    Sleep apnea    Unspecified essential hypertension    Urinary retention    Visual impairment              Past Surgical History:   Procedure Laterality Date    Cath bare metal stent (bms)      Removal of lung,lobectomy      Stent placemt retro carotid Right                 Social History     Socioeconomic History    Marital status:    Tobacco Use    Smoking status: Former    Smokeless tobacco: Never   Vaping Use    Vaping status: Never Used   Substance and Sexual Activity    Alcohol use: Not Currently     Comment: last drink 1030 AM 5/3    Drug use: Never   Other Topics Concern    Caffeine Concern Yes     Comment: iced tea    Exercise Yes     Comment: walking     Social Determinants of Health     Financial Resource Strain: Medium Risk (5/16/2024)    Received from [facility]    Financial Resource Strain     In the past year, have you or any family members you live with been unable to get any of the following when it was really  needed? Check all that apply.: Medicine or Any Health Care (Medical, Dental, Mental Health, Vision)   Food Insecurity: Low Risk  (5/16/2024)    Received from [facility]    Food Insecurity     Within the past 12 months, you worried that your food would run out before you got money to buy more.  : Never true     Within the past 12 months, the food you bought just didn't last and you didn't have money to get more. : Never true   Transportation Needs: Not At Risk (5/16/2024)    Received from [facility]    Transportation Needs     In the past 12 months, has lack of reliable transportation kept you from medical appointments, meetings, work or from getting things needed for daily living? : No   Physical Activity: Low Risk  (3/6/2023)    Received from [facility]    Exercise Vital Sign     On average, how many days per week do you engage in moderate to strenuous exercise (like a brisk walk)?: 5 days     On average, how many minutes do you engage in exercise at this level?: 120 min   Social Connections: High Risk (5/16/2024)    Received from [facility]    Social Connections     How often do you see or talk to people that you care about and feel close to? (For example: talking to friends on the phone, visiting friends or family, going to Taoism or club meetings): Less than once a week   Housing Stability: Low Risk  (5/4/2024)    Housing Stability     Housing Instability: No              Review of Systems    Positive for stated Chief Complaint: Exposure,Chem Occupational    Other systems are as noted in HPI.  Constitutional and vital signs reviewed.      All other systems reviewed and negative except as noted above.    Physical Exam     ED Triage Vitals [08/21/24 2035]   /77   Pulse 108   Resp 16   Temp 96.7 °F (35.9 °C)   Temp src Temporal   SpO2 91 %   O2 Device None (Room air)       Current Vitals:   Vital Signs  BP: 121/82  Pulse: 101  Resp: 16  Temp: 96.7 °F (35.9  °C)  Temp src: Temporal  MAP (mmHg): 95    Oxygen Therapy  SpO2: 97 %  O2 Device: Nasal cannula            Physical Exam    HEENT; NCAT, EOMI, throat clear, neck supple, no LAD, no JVD  Heart S1S2 RRR  lungs: CTAB, dry cough noted with inspiration  abd: Soft NT, ND,  NABS without rebound or guarding  Ext no C/C/E    ED Course   Labs Reviewed - No data to display       ED Course as of 08/21/24 2224  ------------------------------------------------------------  Time: 08/21 2222  Comment: Brought to the patient had a chest x-ray done that appears interpreted showed no acute cardiopulmonary process by my interpretation.  I reviewed the radiology report as well patient was given a DuoNeb and does feel better.  Reexam shows lungs but could auscultation bilaterally     XR CHEST AP PORTABLE  (CPT=71045)    Result Date: 8/21/2024  PROCEDURE:  XR CHEST AP PORTABLE  (CPT=71045)  TECHNIQUE:  AP chest radiograph was obtained.  COMPARISON:  [facility] , XR, XR CHEST AP PORTABLE  (CPT=71045), 5/03/2024, 3:14 PM.  INDICATIONS:  chemical exposure inhalation, history of shortness of breath  PATIENT STATED HISTORY: (As transcribed by Technologist)  Patient shares he inhaled chemical for about 1 min.    FINDINGS:  Heart size is within normal limits.  Pleural spaces appear clear.  Mediastinal and hilar contours are normal.  No focal consolidation.  No significant change from prior exams.  If there is persistent concern then consider follow-up imaging including CT.            CONCLUSION:  See above.   LOCATION:  [facility]      Dictated by (CST): Mark Daly MD on 8/21/2024 at 10:06 PM     Finalized by (CST): Mark Daly MD on 8/21/2024 at 10:07 PM               MDM      Differential diagnosis included bronchospasm, chemical irritation, pneumonia but not limited to such.  The patient here I believe did have irritation secondary to chemical exposure but was observed in the department, was given a treatment and feeling back to his normal  self      Patient was screened and evaluated during this visit.   As a treating physician attending to the patient, I determined, within reasonable clinical confidence and prior to discharge, that an emergency medical condition was not or was no longer present.  There was no indication for further evaluation, treatment or admission on an emergency basis.       The usual and customary discharge instuctions were discussed given the patient's ER course.  We discussed signs and symptoms that should prompt the patient's immediate return to the emergency department.   Reasonable over the counter and prescription treatment options and Physician follow up plan was discussed.       The patient is discharged in good condition.     This note was prepared using Dragon Medical voice recognition dictation software.  As a result errors may occur.  When identified to these areas have been corrected.  While every attempt is made to correct errors during dictation discrepancies may still exist.  Please contact if there are any errors.                                   Medical Decision Making      Disposition and Plan     Clinical Impression:  1. Exposure to chemical inhalation         Disposition:  Discharge  8/21/2024 10:23 pm    Follow-up:  Mesfin Millan MD  [address] [facility] [place] [facility] [address]  [phone]    Schedule an appointment as soon as possible for a visit in 2 day(s)            Medications Prescribed:  Current Discharge Medication List

## 2024-11-25 NOTE — PROGRESS NOTES
Lv 10/11/24   Pulmonary Progress Note        NAME: Fallon Juinor - ROOM: 1451/7535-F - MRN: [id number] - Age: 62year old - : 1960        SUBJECTIVE: no complaints this AM, denies chest pain/SOB    OBJECTIVE:   18  0100 18  0500 18  0814  LIPASE, LDH in the last 72 hours. Invalid input(s): ALPHOS, TBIL, DBIL, TPROT    Invalid input(s): ARTERIALPH, ARTERIALPO2, ARTERIALPCO2, ARTERIALHCO3    No results for input(s): BNP in the last 72 hours.     Invalid input(s): TROPI      Albumin   Date/T

## 2025-04-08 ENCOUNTER — OFFICE VISIT (OUTPATIENT)
Facility: LOCATION | Age: 65
End: 2025-04-08
Payer: MEDICARE

## 2025-04-08 DIAGNOSIS — J38.3 LESION OF TRUE VOCAL CORD: Primary | ICD-10-CM

## 2025-04-08 DIAGNOSIS — K21.9 LARYNGOPHARYNGEAL REFLUX: ICD-10-CM

## 2025-04-08 DIAGNOSIS — Z87.891 H/O TOBACCO USE, PRESENTING HAZARDS TO HEALTH: ICD-10-CM

## 2025-04-08 PROCEDURE — 99203 OFFICE O/P NEW LOW 30 MIN: CPT | Performed by: OTOLARYNGOLOGY

## 2025-04-08 PROCEDURE — 31575 DIAGNOSTIC LARYNGOSCOPY: CPT | Performed by: OTOLARYNGOLOGY

## 2025-04-09 RX ORDER — OMEPRAZOLE 40 MG/1
40 CAPSULE, DELAYED RELEASE ORAL DAILY
Qty: 30 CAPSULE | Refills: 2 | Status: SHIPPED | OUTPATIENT
Start: 2025-04-09 | End: 2025-04-10

## 2025-04-09 NOTE — PROGRESS NOTES
Otolaryngology Consultation Note     Reason for consultation: throat  Consulting physician and service: Mesfin Millan MD      HPI: 64 y/o M presents with throat discomfort and globus sensation for the past several months. Notes \"foamy\" secretions for the past few weeks as well as increased phlegm production. No fever/chills. No dysphagia, odynophagia or difficulty breathing. No changes in voice/hoarseness. Positive h/o tobacco use. No other complaints.      Past Medical History: Past Medical History[1]     Past Surgical History: Past Surgical History[2]     Medication: Scheduled Meds:Scheduled Medications[3]  Continuous Infusions:Medication Infusions[4]  PRN Meds:.PRN Medications[5]     Allergies:  Allergies[6]  Pertinent Family History: Family History[7]     Pertinent Social History:   Social History     Socioeconomic History    Marital status:      Spouse name: Not on file    Number of children: Not on file    Years of education: Not on file    Highest education level: Not on file   Occupational History    Not on file   Tobacco Use    Smoking status: Former    Smokeless tobacco: Never   Vaping Use    Vaping status: Never Used   Substance and Sexual Activity    Alcohol use: Not Currently     Comment: last drink 1030 AM 5/3    Drug use: Never    Sexual activity: Not on file   Other Topics Concern     Service Not Asked    Blood Transfusions Not Asked    Caffeine Concern Yes     Comment: iced tea    Occupational Exposure Not Asked    Hobby Hazards Not Asked    Sleep Concern Not Asked    Stress Concern Not Asked    Weight Concern Not Asked    Special Diet Not Asked    Back Care Not Asked    Exercise Yes     Comment: walking    Bike Helmet Not Asked    Seat Belt Not Asked    Self-Exams Not Asked   Social History Narrative    Not on file     Social Drivers of Health     Food Insecurity: Low Risk  (5/16/2024)    Received from [facility]    Food Insecurity     Within the past 12 months, you  worried that your food would run out before you got money to buy more.  : Never true     Within the past 12 months, the food you bought just didn't last and you didn't have money to get more. : Never true   Transportation Needs: Not At Risk (5/16/2024)    Received from [facility]    Transportation Needs     In the past 12 months, has lack of reliable transportation kept you from medical appointments, meetings, work or from getting things needed for daily living? : No   Stress: Not on file   Housing Stability: Low Risk  (5/4/2024)    Housing Stability     Housing Instability: No     Housing Instability Emergency: Not on file     Crib or Bassinette: Not on file        Review of Systems:  Constitutional: Negative.  HENT: see above  Eyes: Negative.  Respiratory: Negative.  Cardiovascular: Negative.  Gastrointestinal: Negative.  Musculoskeletal: Negative.  Skin: Negative.  Renal: Negative  Endocrine: Negative  Psychiatric/Behavioral: Negative.     Physical Examination:  Vitals: There were no vitals taken for this visit.     General: Breathing comfortably on room air while sitting up. Able to communicate verbally. Voice normal. Normal appearing body habitus.     Musculoskeletal: Head: Atraumatic and normocephalic.     Neck: Full ROM and able to extend without issues     Ears: External auditory canals clear with no evidence of significant cerumen or stenosis. Tympanic membranes visible with no evidence of retraction or perforation. No evidence of middle ear effusion bilaterally.     Nose: No sinus tenderness bilaterally upon palpation. No obvious nasal deformity. No masses, rhinorrhea, epistaxis. Nasal septum, mucosa, and turbinates appear normal.     Mouth/Throat: Salivary glands appear normal with no evidence of pain or mass. No masses or lesions noted within the oral mucosa, hard and soft palates, tongue, tonsils and posterior pharynx. Able to tolerate secretions. Oral cavity and oropharynx widely patent.  Tonsils 1 plus and symmetric. Posterior pharyngeal walls appear normal. Thyroid non tender to palpation without evidence of mass or nodules.     Eyes: Extraocular movements intact and pupils equally reactive to light stimulus. No spontaneous or gaze-evoked nystagmus. No proptosis or ecchymosis. VFI.     Lymphatic: No significant cervical lymphadenopathy noted.     Neuro: CN 7 intact with symmetric mobility and strength. No loss of facial sensation.     Skin: Dry, normal turgor, normal color.     Psych: Alert and oriented to person/place/time. Normal affect, amiable     Significant laboratory values: n/a     Imaging: n/a     Procedures:   Otolaryngology Procedure     Patient name: Tong León     YOB: 1960     Procedure: Flexible Fiberoptic Laryngoscopy     Indication: throat discomfort/pain     Anesthesia: Topical 1% lidocaine with oxymetazoline     Surgeon: Todd Leger MD     Procedure detail: Benefits and risks discussed with patient, which include bleeding, pain, infection, damage to surrounding structures. Agreement and informed consent obtained from patient. Patient, while sitting up, was anesthetized with topical anesthetic. This was allowed to sit for 5 minutes. A lubricated flexible fiberoptic laryngoscope was inserted through the nasal cavity to the level of the larynx with findings as follows: No masses or lesions in nasal cavities, nasopharynx, oropharynx, hypopharynx. Moderate interarytenoid edema; few white lesions noted along anterior 2/3 of right TVF, TVF mobile/symmetric bilaterally. Airway widely patent. Scope was then removed and patient tolerated to procedure well without complication.     Dispo: Patient instructed to follow up as instructed within assessment and plan portion of this notation.          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 Rpt FFL on return to clinic to ensure resolution. Recommend MDL with biopsy in OR if persists  Laryngopharyngeal  reflux: start Omeprazole 40 mg daily, reflux precautions.  H/o tobacco use: cont to encourage cessation       Dr. Mesfin Millan MD, thank you for involving me in this patient's care. Please contact me with further questions or concerns.    Todd Leger MD         [1]   Past Medical History:   Anxiety    Back problem    Calculus of kidney    Coronary atherosclerosis    Depression    Enlarged prostate    High blood pressure    High cholesterol    NIDIA (obstructive sleep apnea)    Other and unspecified hyperlipidemia    Sleep apnea    Unspecified essential hypertension    Urinary retention    Visual impairment   [2]   Past Surgical History:  Procedure Laterality Date    Cath bare metal stent (bms)      Removal of lung,lobectomy      Stent placemt retro carotid Right    [3] [4] [5] [6] No Known Allergies  [7]   Family History  Problem Relation Age of Onset    Heart Disorder Mother     Diabetes Paternal Grandmother

## 2025-04-10 ENCOUNTER — TELEPHONE (OUTPATIENT)
Facility: LOCATION | Age: 65
End: 2025-04-10

## 2025-04-10 RX ORDER — FAMOTIDINE 20 MG/1
20 TABLET, FILM COATED ORAL 2 TIMES DAILY
Qty: 60 TABLET | Refills: 2 | Status: SHIPPED | OUTPATIENT
Start: 2025-04-10

## 2025-04-10 NOTE — TELEPHONE ENCOUNTER
Pt called back and was told by pharmacists that it is not ok to mix the omeprazole with his clopidogrel

## 2025-04-16 DIAGNOSIS — I65.23 BILATERAL CAROTID ARTERY STENOSIS: Primary | ICD-10-CM

## 2025-04-26 ENCOUNTER — APPOINTMENT (OUTPATIENT)
Dept: GENERAL RADIOLOGY | Facility: HOSPITAL | Age: 65
End: 2025-04-26
Payer: MEDICARE

## 2025-04-26 ENCOUNTER — APPOINTMENT (OUTPATIENT)
Dept: CT IMAGING | Facility: HOSPITAL | Age: 65
End: 2025-04-26
Attending: EMERGENCY MEDICINE
Payer: MEDICARE

## 2025-04-26 ENCOUNTER — HOSPITAL ENCOUNTER (EMERGENCY)
Facility: HOSPITAL | Age: 65
Discharge: HOME | End: 2025-04-26
Attending: EMERGENCY MEDICINE
Payer: MEDICARE

## 2025-04-26 VITALS
TEMPERATURE: 99 F | OXYGEN SATURATION: 97 % | WEIGHT: 230 LBS | BODY MASS INDEX: 32.93 KG/M2 | SYSTOLIC BLOOD PRESSURE: 122 MMHG | HEIGHT: 70 IN | HEART RATE: 75 BPM | RESPIRATION RATE: 22 BRPM | DIASTOLIC BLOOD PRESSURE: 85 MMHG

## 2025-04-26 DIAGNOSIS — E86.0 DEHYDRATION: ICD-10-CM

## 2025-04-26 DIAGNOSIS — K21.9 GASTROESOPHAGEAL REFLUX DISEASE, UNSPECIFIED WHETHER ESOPHAGITIS PRESENT: Primary | ICD-10-CM

## 2025-04-26 LAB
ALBUMIN SERPL-MCNC: 5.2 G/DL (ref 3.2–4.8)
ALBUMIN/GLOB SERPL: 2 {RATIO} (ref 1–2)
ALP LIVER SERPL-CCNC: 73 U/L (ref 45–117)
ALT SERPL-CCNC: 20 U/L (ref 10–49)
ANION GAP SERPL CALC-SCNC: 11 MMOL/L (ref 0–18)
AST SERPL-CCNC: 22 U/L (ref ?–34)
BASOPHILS # BLD AUTO: 0.05 X10(3) UL (ref 0–0.2)
BASOPHILS NFR BLD AUTO: 0.6 %
BILIRUB SERPL-MCNC: 0.7 MG/DL (ref 0.2–1.1)
BUN BLD-MCNC: 11 MG/DL (ref 9–23)
CALCIUM BLD-MCNC: 10 MG/DL (ref 8.7–10.6)
CHLORIDE SERPL-SCNC: 103 MMOL/L (ref 98–112)
CO2 SERPL-SCNC: 26 MMOL/L (ref 21–32)
CREAT BLD-MCNC: 1.03 MG/DL (ref 0.7–1.3)
D DIMER PPP FEU-MCNC: 0.54 UG/ML FEU (ref ?–0.65)
EGFRCR SERPLBLD CKD-EPI 2021: 81 ML/MIN/1.73M2 (ref 60–?)
EOSINOPHIL # BLD AUTO: 0.14 X10(3) UL (ref 0–0.7)
EOSINOPHIL NFR BLD AUTO: 1.8 %
ERYTHROCYTE [DISTWIDTH] IN BLOOD BY AUTOMATED COUNT: 13.3 %
FLUAV + FLUBV RNA SPEC NAA+PROBE: NEGATIVE
FLUAV + FLUBV RNA SPEC NAA+PROBE: NEGATIVE
GLOBULIN PLAS-MCNC: 2.6 G/DL (ref 2–3.5)
GLUCOSE BLD-MCNC: 104 MG/DL (ref 70–99)
GLUCOSE BLD-MCNC: 113 MG/DL (ref 70–99)
HCT VFR BLD AUTO: 49.6 % (ref 39–53)
HGB BLD-MCNC: 17 G/DL (ref 13–17.5)
IMM GRANULOCYTES # BLD AUTO: 0.01 X10(3) UL (ref 0–1)
IMM GRANULOCYTES NFR BLD: 0.1 %
LIPASE SERPL-CCNC: 26 U/L (ref 12–53)
LYMPHOCYTES # BLD AUTO: 2.08 X10(3) UL (ref 1–4)
LYMPHOCYTES NFR BLD AUTO: 26.9 %
MCH RBC QN AUTO: 31.2 PG (ref 26–34)
MCHC RBC AUTO-ENTMCNC: 34.3 G/DL (ref 31–37)
MCV RBC AUTO: 91 FL (ref 80–100)
MONOCYTES # BLD AUTO: 0.52 X10(3) UL (ref 0.1–1)
MONOCYTES NFR BLD AUTO: 6.7 %
NEUTROPHILS # BLD AUTO: 4.92 X10 (3) UL (ref 1.5–7.7)
NEUTROPHILS # BLD AUTO: 4.92 X10(3) UL (ref 1.5–7.7)
NEUTROPHILS NFR BLD AUTO: 63.9 %
OSMOLALITY SERPL CALC.SUM OF ELEC: 290 MOSM/KG (ref 275–295)
PLATELET # BLD AUTO: 207 10(3)UL (ref 150–450)
POTASSIUM SERPL-SCNC: 4.2 MMOL/L (ref 3.5–5.1)
PROT SERPL-MCNC: 7.8 G/DL (ref 5.7–8.2)
RBC # BLD AUTO: 5.45 X10(6)UL (ref 3.8–5.8)
RSV RNA SPEC NAA+PROBE: NEGATIVE
SARS-COV-2 RNA RESP QL NAA+PROBE: NOT DETECTED
SODIUM SERPL-SCNC: 140 MMOL/L (ref 136–145)
TROPONIN I SERPL HS-MCNC: <3 NG/L (ref ?–53)
WBC # BLD AUTO: 7.7 X10(3) UL (ref 4–11)

## 2025-04-26 PROCEDURE — 82962 GLUCOSE BLOOD TEST: CPT

## 2025-04-26 PROCEDURE — 83690 ASSAY OF LIPASE: CPT | Performed by: EMERGENCY MEDICINE

## 2025-04-26 PROCEDURE — 96374 THER/PROPH/DIAG INJ IV PUSH: CPT

## 2025-04-26 PROCEDURE — 80053 COMPREHEN METABOLIC PANEL: CPT | Performed by: EMERGENCY MEDICINE

## 2025-04-26 PROCEDURE — 93005 ELECTROCARDIOGRAM TRACING: CPT

## 2025-04-26 PROCEDURE — 85025 COMPLETE CBC W/AUTO DIFF WBC: CPT

## 2025-04-26 PROCEDURE — 80053 COMPREHEN METABOLIC PANEL: CPT

## 2025-04-26 PROCEDURE — 84484 ASSAY OF TROPONIN QUANT: CPT

## 2025-04-26 PROCEDURE — 85379 FIBRIN DEGRADATION QUANT: CPT | Performed by: EMERGENCY MEDICINE

## 2025-04-26 PROCEDURE — 0241U SARS-COV-2/FLU A AND B/RSV BY PCR (GENEXPERT): CPT | Performed by: EMERGENCY MEDICINE

## 2025-04-26 PROCEDURE — 84484 ASSAY OF TROPONIN QUANT: CPT | Performed by: EMERGENCY MEDICINE

## 2025-04-26 PROCEDURE — 93010 ELECTROCARDIOGRAM REPORT: CPT

## 2025-04-26 PROCEDURE — 83690 ASSAY OF LIPASE: CPT

## 2025-04-26 PROCEDURE — 96361 HYDRATE IV INFUSION ADD-ON: CPT

## 2025-04-26 PROCEDURE — 85025 COMPLETE CBC W/AUTO DIFF WBC: CPT | Performed by: EMERGENCY MEDICINE

## 2025-04-26 PROCEDURE — 96375 TX/PRO/DX INJ NEW DRUG ADDON: CPT

## 2025-04-26 PROCEDURE — 99285 EMERGENCY DEPT VISIT HI MDM: CPT

## 2025-04-26 PROCEDURE — 70450 CT HEAD/BRAIN W/O DYE: CPT | Performed by: EMERGENCY MEDICINE

## 2025-04-26 PROCEDURE — 71045 X-RAY EXAM CHEST 1 VIEW: CPT

## 2025-04-26 RX ORDER — SUCRALFATE 1 G/1
1 TABLET ORAL
Qty: 56 TABLET | Refills: 0 | Status: SHIPPED | OUTPATIENT
Start: 2025-04-26

## 2025-04-26 RX ORDER — METOCLOPRAMIDE HYDROCHLORIDE 5 MG/ML
10 INJECTION INTRAMUSCULAR; INTRAVENOUS ONCE
Status: COMPLETED | OUTPATIENT
Start: 2025-04-26 | End: 2025-04-26

## 2025-04-26 RX ORDER — PANTOPRAZOLE SODIUM 40 MG/1
40 TABLET, DELAYED RELEASE ORAL DAILY
Qty: 30 TABLET | Refills: 0 | Status: SHIPPED | OUTPATIENT
Start: 2025-04-26 | End: 2025-05-26

## 2025-04-26 RX ORDER — DIPHENHYDRAMINE HYDROCHLORIDE 50 MG/ML
50 INJECTION, SOLUTION INTRAMUSCULAR; INTRAVENOUS ONCE
Status: COMPLETED | OUTPATIENT
Start: 2025-04-26 | End: 2025-04-26

## 2025-04-26 NOTE — ED INITIAL ASSESSMENT (HPI)
States he has been sick all week with a stomach ache, woke up today to go to the bathroom, didn't feel right and passed out and hit his head from a standing position No vomiting after the fall/head injury.    No vomiting r/t his stomach ache, some diarrhea but not eating much. No cardiac hx, but does have stent in his neck. States NO chest pain, SALUD, SOB. Ambulatory to triage.

## 2025-04-26 NOTE — ED PROVIDER NOTES
Patient Seen in: [facility] Emergency Department      History     Chief Complaint   Patient presents with    Syncope    Dizziness    Abdomen/Flank Pain     Stated Complaint: syncopal epsiode this AM, hit head. dizzy    Subjective:   HPI    65-year-old male presents to the emergency department stated that he got up to use the restroom this morning, went downstairs and then suddenly felt lightheaded as though he was going to pass out, briefly lost consciousness and fell hitting his head on the floor.  He complains of a worse headache now than he has been experiencing over the past handful of days due to the head injury.  No vision changes, speech difficulty, unilateral numbness or weakness.  Patient is on Plavix and aspirin.  The patient states that he has been feeling poorly for the past several days with a headache and some epigastric abdominal pain that he has attributed to reflux that he was advised he was suffering from previously.  He is no longer on an H2 blocker or PPI.  Some nausea.  No vomiting.  He stated that he had diarrhea this morning.  He has been depressed recently because his mother  a few weeks ago.  He currently lives alone and is retired.  History of Present Illness               Objective:     Past Medical History:    Anxiety    Back problem    Calculus of kidney    Coronary atherosclerosis    Depression    Enlarged prostate    High blood pressure    High cholesterol    NIDIA (obstructive sleep apnea)    Other and unspecified hyperlipidemia    Sleep apnea    Unspecified essential hypertension    Urinary retention    Visual impairment              Past Surgical History:   Procedure Laterality Date    Cath bare metal stent (bms)      Removal of lung,lobectomy      Stent placemt retro carotid Right                 Social History     Socioeconomic History    Marital status:    Tobacco Use    Smoking status: Former    Smokeless tobacco: Never   Vaping Use    Vaping status: Never Used    Substance and Sexual Activity    Alcohol use: Not Currently     Comment: last drink 1030 AM 5/3    Drug use: Never   Other Topics Concern    Caffeine Concern Yes     Comment: iced tea    Exercise Yes     Comment: walking     Social Drivers of Health     Food Insecurity: Low Risk  (5/16/2024)    Received from [facility]    Food Insecurity     Within the past 12 months, you worried that your food would run out before you got money to buy more.  : Never true     Within the past 12 months, the food you bought just didn't last and you didn't have money to get more. : Never true   Transportation Needs: Not At Risk (5/16/2024)    Received from [facility]    Transportation Needs     In the past 12 months, has lack of reliable transportation kept you from medical appointments, meetings, work or from getting things needed for daily living? : No   Housing Stability: Low Risk  (5/4/2024)    Housing Stability     Housing Instability: No                                Physical Exam     ED Triage Vitals [04/26/25 1426]   BP 96/66   Pulse 110   Resp 22   Temp 98.5 °F (36.9 °C)   Temp src Temporal   SpO2 90 %   O2 Device None (Room air)       Current Vitals:   Vital Signs  BP: 122/85  Pulse: 75  Resp: 22  Temp: 98.5 °F (36.9 °C)  Temp src: Temporal  MAP (mmHg): 97    Oxygen Therapy  SpO2: 97 %  O2 Device: None (Room air)        Physical Exam     Physical Exam            General Appearance: This is a middle-age male lying on a gurney.  Vital signs were reviewed per nurses notes.  Monitor reveals a sinus rhythm rate of 95.  Initial blood pressure was 113/70.  Pulse oximetry 92% on room air.  HEENT: Normocephalic/atraumatic.  Anicteric sclera.  Oral mucosa is moist.  Oropharynx is normal.  Neck: No adenopathy or thyromegaly.  Lungs are clear to auscultation.  Heart exam: Normal S1-S2 without extra sounds or murmurs.  Regular rate and rhythm.  Abdomen is nontender.  Extremities are atraumatic.  Skin is dry  without rashes or lesions.  Neuroexam: Alert and oriented x 4.  Speech is fluent.  Moving all 4 extremities well.  ED Course     Labs Reviewed   COMP METABOLIC PANEL (14) - Abnormal; Notable for the following components:       Result Value    Glucose 113 (*)     Albumin 5.2 (*)     All other components within normal limits   POCT GLUCOSE - Abnormal; Notable for the following components:    POC Glucose 104 (*)     All other components within normal limits   TROPONIN I HIGH SENSITIVITY - Normal   LIPASE - Normal   D-DIMER - Normal   SARS-COV-2/FLU A AND B/RSV BY PCR (GENEXPERT) - Normal    Narrative:     This test is intended for the qualitative detection and differentiation of SARS-CoV-2, influenza A, influenza B, and respiratory syncytial virus (RSV) viral RNA in nasopharyngeal or nares swabs from individuals suspected of respiratory viral infection consistent with COVID-19 by their healthcare provider. Signs and symptoms of respiratory viral infection due to SARS-CoV-2, influenza, and RSV can be similar.    Test performed using the Xpert Xpress SARS-CoV-2/FLU/RSV (real time RT-PCR)  assay on the [employer] instrument, [employer], [place] [address].   This test is being used under the Food and Drug Administration's Emergency Use Authorization.    The authorized Fact Sheet for Healthcare Providers for this assay is available upon request from the laboratory.   CBC WITH DIFFERENTIAL WITH PLATELET   RAINBOW DRAW LAVENDER   RAINBOW DRAW LIGHT GREEN   RAINBOW DRAW BLUE     EKG    Rate, intervals and axes as noted on EKG Report.  Rate: 95  Rhythm: Sinus Rhythm  Reading: Normal EKG.  Agree with EKG report.              Results            Intravenous access was obtained.  Laboratory studies were drawn.  Normal saline was administered.    CT BRAIN OR HEAD (CPT=70450)  Result Date: 4/26/2025  PROCEDURE:  CT BRAIN OR HEAD (29776)  COMPARISON:  [facility] , CT, CTA BRAIN + CTA CAROTIDS (CPT=70496/69182), 1/12/2023, 3:35 PM.   INDICATIONS:  syncopal epsiode this AM, hit head. dizzy  TECHNIQUE:  Noncontrast CT scanning is performed through the brain. Dose reduction techniques were used. Dose information is transmitted to the ACR (American College of Radiology) NRDR (National Radiology Data Registry) which includes the Dose Index Registry.  PATIENT STATED HISTORY: (As transcribed by Technologist)  Syncopal episode    FINDINGS:  Ventricles and sulci are diffusely prominent in caliber, stable.  Allowing for age this is considered moderate generalized atrophy.  No new mass effect or midline shift.  No acute intracranial hemorrhage.  White matter low attenuation is similar to the prior and consistent with mild to moderate chronic small vessel disease.  Prior focal infarct/insult left caudate nucleus head is similar to the prior.  The visualized paranasal sinuses and mastoid air cells are satisfactorily aerated.               CONCLUSION:  No acute intracranial abnormality.    LOCATION:  [facility]   Dictated by (CST): Ronaldo Thorpe MD on 4/26/2025 at 3:55 PM     Finalized by (CST): Ronaldo Thorpe MD on 4/26/2025 at 3:57 PM       XR CHEST AP PORTABLE  (CPT=71045)  Result Date: 4/26/2025  PROCEDURE:  XR CHEST AP PORTABLE  (CPT=71045)  TECHNIQUE:  AP chest radiograph was obtained.  COMPARISON:  [facility] , XR, XR CHEST AP PORTABLE  (CPT=71045), 8/21/2024, 9:47 PM.  INDICATIONS:  syncopal epsiode this AM, hit head. dizzy  PATIENT STATED HISTORY: (As transcribed by Technologist)  Patient stated he had a syncopal episode yesterday.    FINDINGS:  The heart is normal in size.  The lungs are clear of acute-appearing disease process.  The costophrenic angles are sharp.  There is no active disease seen on the basis of portable chest radiography.            CONCLUSION:  No active disease seen.   LOCATION:  [facility]      Dictated by (CST): Noah Andre MD on 4/26/2025 at 3:26 PM     Finalized by (CST): Noah Andre MD on 4/26/2025 at 3:26 PM       I personally  reviewed the images myself and went over results with patient.    I viewed the chest x-ray and CT plain brain films myself.  No cardiopulmonary or intracranial process of an acute nature was noted.    Patient was ambulatory without difficulty after IV fluids and felt symptomatically improved.  Test results and treatment plan were discussed prior to disposition.                     MDM      #1.  GE reflux disease.  Epigastric discomfort most consistent with GE reflux in the absence of any laboratory abnormalities that would suggest acute coronary syndrome, pancreatitis or cholecystitis.  PPI and Carafate prescribed.  2.  Dehydration.  Most likely etiology for syncope that is most consistent with vasovagal etiology.  No evidence of an arrhythmia.  No notable seizure activity.  No evidence of intracranial hemorrhage.  Symptomatically improved with IV fluids.        Medical Decision Making      Disposition and Plan     Clinical Impression:  1. Gastroesophageal reflux disease, unspecified whether esophagitis present    2. Dehydration         Disposition:  Discharge  4/26/2025  5:44 pm    Follow-up:  Mesfin Millan MD  [address] [address]  [phone]    Call in 2 day(s)            Medications Prescribed:  Discharge Medication List as of 4/26/2025  5:56 PM        START taking these medications    Details   pantoprazole 40 MG Oral Tab EC Take 1 tablet (40 mg total) by mouth daily., Normal, Disp-30 tablet, R-0      sucralfate 1 g Oral Tab Take 1 tablet (1 g total) by mouth 4 (four) times daily before meals and nightly., Normal, Disp-56 tablet, R-0             Supplementary Documentation:

## 2025-04-28 LAB
ATRIAL RATE: 95 BPM
P AXIS: 48 DEGREES
P-R INTERVAL: 146 MS
Q-T INTERVAL: 364 MS
QRS DURATION: 94 MS
QTC CALCULATION (BEZET): 457 MS
R AXIS: 18 DEGREES
T AXIS: 35 DEGREES
VENTRICULAR RATE: 95 BPM

## 2025-05-14 ENCOUNTER — HOSPITAL ENCOUNTER (EMERGENCY)
Facility: HOSPITAL | Age: 65
Discharge: HOME | End: 2025-05-14
Payer: MEDICARE

## 2025-05-14 ENCOUNTER — APPOINTMENT (OUTPATIENT)
Dept: GENERAL RADIOLOGY | Facility: HOSPITAL | Age: 65
End: 2025-05-14
Payer: MEDICARE

## 2025-05-14 VITALS
HEART RATE: 92 BPM | OXYGEN SATURATION: 95 % | SYSTOLIC BLOOD PRESSURE: 122 MMHG | WEIGHT: 234 LBS | BODY MASS INDEX: 34 KG/M2 | RESPIRATION RATE: 18 BRPM | TEMPERATURE: 98 F | DIASTOLIC BLOOD PRESSURE: 65 MMHG

## 2025-05-14 DIAGNOSIS — R19.7 DIARRHEA, UNSPECIFIED TYPE: ICD-10-CM

## 2025-05-14 DIAGNOSIS — J06.9 UPPER RESPIRATORY TRACT INFECTION, UNSPECIFIED TYPE: ICD-10-CM

## 2025-05-14 DIAGNOSIS — H10.31 ACUTE CONJUNCTIVITIS OF RIGHT EYE, UNSPECIFIED ACUTE CONJUNCTIVITIS TYPE: Primary | ICD-10-CM

## 2025-05-14 LAB
ALBUMIN SERPL-MCNC: 4.7 G/DL (ref 3.2–4.8)
ALBUMIN/GLOB SERPL: 1.9 {RATIO} (ref 1–2)
ALP LIVER SERPL-CCNC: 76 U/L (ref 45–117)
ALT SERPL-CCNC: 14 U/L (ref 10–49)
ANION GAP SERPL CALC-SCNC: 9 MMOL/L (ref 0–18)
AST SERPL-CCNC: 17 U/L (ref ?–34)
BASOPHILS # BLD AUTO: 0.05 X10(3) UL (ref 0–0.2)
BASOPHILS NFR BLD AUTO: 0.4 %
BILIRUB SERPL-MCNC: 0.5 MG/DL (ref 0.2–1.1)
BUN BLD-MCNC: 18 MG/DL (ref 9–23)
CALCIUM BLD-MCNC: 9.3 MG/DL (ref 8.7–10.6)
CHLORIDE SERPL-SCNC: 106 MMOL/L (ref 98–112)
CO2 SERPL-SCNC: 26 MMOL/L (ref 21–32)
CREAT BLD-MCNC: 0.91 MG/DL (ref 0.7–1.3)
EGFRCR SERPLBLD CKD-EPI 2021: 94 ML/MIN/1.73M2 (ref 60–?)
EOSINOPHIL # BLD AUTO: 0.43 X10(3) UL (ref 0–0.7)
EOSINOPHIL NFR BLD AUTO: 3.9 %
ERYTHROCYTE [DISTWIDTH] IN BLOOD BY AUTOMATED COUNT: 13.7 %
FLUAV + FLUBV RNA SPEC NAA+PROBE: NEGATIVE
FLUAV + FLUBV RNA SPEC NAA+PROBE: NEGATIVE
GLOBULIN PLAS-MCNC: 2.5 G/DL (ref 2–3.5)
GLUCOSE BLD-MCNC: 97 MG/DL (ref 70–99)
HCT VFR BLD AUTO: 44.7 % (ref 39–53)
HGB BLD-MCNC: 15.1 G/DL (ref 13–17.5)
IMM GRANULOCYTES # BLD AUTO: 0.05 X10(3) UL (ref 0–1)
IMM GRANULOCYTES NFR BLD: 0.4 %
LYMPHOCYTES # BLD AUTO: 1.93 X10(3) UL (ref 1–4)
LYMPHOCYTES NFR BLD AUTO: 17.3 %
MCH RBC QN AUTO: 30.8 PG (ref 26–34)
MCHC RBC AUTO-ENTMCNC: 33.8 G/DL (ref 31–37)
MCV RBC AUTO: 91.2 FL (ref 80–100)
MONOCYTES # BLD AUTO: 0.88 X10(3) UL (ref 0.1–1)
MONOCYTES NFR BLD AUTO: 7.9 %
NEUTROPHILS # BLD AUTO: 7.79 X10 (3) UL (ref 1.5–7.7)
NEUTROPHILS # BLD AUTO: 7.79 X10(3) UL (ref 1.5–7.7)
NEUTROPHILS NFR BLD AUTO: 70.1 %
OSMOLALITY SERPL CALC.SUM OF ELEC: 294 MOSM/KG (ref 275–295)
PLATELET # BLD AUTO: 164 10(3)UL (ref 150–450)
POTASSIUM SERPL-SCNC: 4.2 MMOL/L (ref 3.5–5.1)
PROT SERPL-MCNC: 7.2 G/DL (ref 5.7–8.2)
RBC # BLD AUTO: 4.9 X10(6)UL (ref 3.8–5.8)
RSV RNA SPEC NAA+PROBE: NEGATIVE
SARS-COV-2 RNA RESP QL NAA+PROBE: NOT DETECTED
SODIUM SERPL-SCNC: 141 MMOL/L (ref 136–145)
WBC # BLD AUTO: 11.1 X10(3) UL (ref 4–11)

## 2025-05-14 PROCEDURE — 36415 COLL VENOUS BLD VENIPUNCTURE: CPT

## 2025-05-14 PROCEDURE — 99285 EMERGENCY DEPT VISIT HI MDM: CPT

## 2025-05-14 PROCEDURE — 99284 EMERGENCY DEPT VISIT MOD MDM: CPT

## 2025-05-14 PROCEDURE — 80053 COMPREHEN METABOLIC PANEL: CPT

## 2025-05-14 PROCEDURE — 85025 COMPLETE CBC W/AUTO DIFF WBC: CPT

## 2025-05-14 PROCEDURE — 71046 X-RAY EXAM CHEST 2 VIEWS: CPT

## 2025-05-14 PROCEDURE — 0241U SARS-COV-2/FLU A AND B/RSV BY PCR (GENEXPERT): CPT

## 2025-05-14 RX ORDER — ERYTHROMYCIN 5 MG/G
1 OINTMENT OPHTHALMIC EVERY 6 HOURS
Qty: 1 G | Refills: 0 | Status: SHIPPED | OUTPATIENT
Start: 2025-05-14 | End: 2025-05-21

## 2025-05-14 RX ORDER — BENZONATATE 100 MG/1
100 CAPSULE ORAL 3 TIMES DAILY PRN
Qty: 30 CAPSULE | Refills: 0 | Status: SHIPPED | OUTPATIENT
Start: 2025-05-14 | End: 2025-06-13

## 2025-05-15 ENCOUNTER — LAB ENCOUNTER (OUTPATIENT)
Dept: LAB | Facility: HOSPITAL | Age: 65
End: 2025-05-15
Attending: STUDENT IN AN ORGANIZED HEALTH CARE EDUCATION/TRAINING PROGRAM
Payer: MEDICARE

## 2025-05-15 DIAGNOSIS — R19.7 DIARRHEA, UNSPECIFIED TYPE: ICD-10-CM

## 2025-05-15 PROCEDURE — 87045 FECES CULTURE AEROBIC BACT: CPT

## 2025-05-15 PROCEDURE — 87493 C DIFF AMPLIFIED PROBE: CPT

## 2025-05-15 PROCEDURE — 87015 SPECIMEN INFECT AGNT CONCNTJ: CPT

## 2025-05-15 PROCEDURE — 82272 OCCULT BLD FECES 1-3 TESTS: CPT

## 2025-05-15 PROCEDURE — 87427 SHIGA-LIKE TOXIN AG IA: CPT

## 2025-05-15 PROCEDURE — 87046 STOOL CULTR AEROBIC BACT EA: CPT

## 2025-05-15 PROCEDURE — 87077 CULTURE AEROBIC IDENTIFY: CPT

## 2025-05-15 NOTE — ED PROVIDER NOTES
Patient Seen in: [facility] Emergency Department      History     Chief Complaint   Patient presents with    Cough/URI    Nausea/Vomiting/Diarrhea     Stated Complaint: Sinus pressure, congestion, cough    Subjective:   HPI    Patient is a 65-year-old gentleman presenting to the emergency department with nasal congestion, sinus pressure cough and intermittent redness with purulent drainage from the right eye, as well as diarrhea.  Diarrhea has been ongoing for a couple of months.  Patient does have previous history of C. difficile and was recently on oral antibiotics for right eye infection along with steroid eyedrops.  He continues to have purulent drainage from his right eye.  No change in vision.  Nasal congestion and cough has been most bothersome for the patient in the last several days prompting the visit to the ER.  No sore throat, no vomiting, able to tolerate oral liquids without problems.  No abdominal pain, no other complaints.  He states the diarrhea does not smell similar to when he had C. difficile previously but has become more watery in the last several days.    Objective:   Past Medical History:    Anxiety    Back problem    Calculus of kidney    Coronary atherosclerosis    Depression    Enlarged prostate    High blood pressure    High cholesterol    NIDIA (obstructive sleep apnea)    Other and unspecified hyperlipidemia    Sleep apnea    Unspecified essential hypertension    Urinary retention    Visual impairment              Past Surgical History:   Procedure Laterality Date    Cath bare metal stent (bms)      Removal of lung,lobectomy      Stent placemt retro carotid Right                 Social History     Socioeconomic History    Marital status:    Tobacco Use    Smoking status: Former    Smokeless tobacco: Never   Vaping Use    Vaping status: Never Used   Substance and Sexual Activity    Alcohol use: Not Currently     Comment: last drink 1030 AM 5/3    Drug use: Never   Other  Topics Concern    Caffeine Concern Yes     Comment: iced tea    Exercise Yes     Comment: walking     Social Drivers of Health     Food Insecurity: Low Risk  (5/16/2024)    Received from [facility]    Food Insecurity     Within the past 12 months, you worried that your food would run out before you got money to buy more.  : Never true     Within the past 12 months, the food you bought just didn't last and you didn't have money to get more. : Never true   Transportation Needs: Not At Risk (5/16/2024)    Received from [facility]    Transportation Needs     In the past 12 months, has lack of reliable transportation kept you from medical appointments, meetings, work or from getting things needed for daily living? : No   Housing Stability: Low Risk  (5/4/2024)    Housing Stability     Housing Instability: No              Review of Systems    Positive for stated complaint: Sinus pressure, congestion, cough  Other systems are as noted in HPI.  Constitutional and vital signs reviewed.      All other systems reviewed and negative except as noted above.    Physical Exam     ED Triage Vitals [05/14/25 1921]   BP 97/63   Pulse 97   Resp 20   Temp 98.4 °F (36.9 °C)   Temp src Oral   SpO2 96 %   O2 Device None (Room air)       Current:/65   Pulse 92   Temp 98.4 °F (36.9 °C) (Oral)   Resp 18   Wt 106.1 kg   SpO2 95%   BMI 33.58 kg/m²         Physical Exam    Constitutional: No apparent distress  Eyes: No scleral icterus, right eye with very mild conjunctival injection and small amount of purulent eye drainage present.  Heart: regular rate rhythm, no murmurs  Lungs: Clear to auscultation bilaterally  Abdomen: Soft and nontender  Skin: No rash  Neuro: Alert and oriented ×3          ED Course/ My interpretations:     Labs Reviewed   CBC WITH DIFFERENTIAL WITH PLATELET - Abnormal; Notable for the following components:       Result Value    WBC 11.1 (*)     Neutrophil Absolute Prelim 7.79 (*)      Neutrophil Absolute 7.79 (*)     All other components within normal limits   COMP METABOLIC PANEL (14) - Normal   SARS-COV-2/FLU A AND B/RSV BY PCR (GENEXPERT) - Normal    Narrative:     This test is intended for the qualitative detection and differentiation of SARS-CoV-2, influenza A, influenza B, and respiratory syncytial virus (RSV) viral RNA in nasopharyngeal or nares swabs from individuals suspected of respiratory viral infection consistent with COVID-19 by their healthcare provider. Signs and symptoms of respiratory viral infection due to SARS-CoV-2, influenza, and RSV can be similar.    Test performed using the Xpert Xpress SARS-CoV-2/FLU/RSV (real time RT-PCR)  assay on the GeneXpert instrument, [facility], [place] [address].   This test is being used under the Food and Drug Administration's Emergency Use Authorization.    The authorized Fact Sheet for Healthcare Providers for this assay is available upon request from the laboratory.         My independent imaging interpretation:      Procedures    CBC With Differential With Platelet    Comp Metabolic Panel (14)    XR CHEST PA + LAT CHEST (CPT=71046)      No consolidation.  All available radiology reports for this visit reviewed.      Medications given:  Orders Placed This Encounter    CBC With Differential With Platelet    Comp Metabolic Panel (14)    erythromycin 5 MG/GM Ophthalmic Ointment    benzonatate 100 MG Oral Cap                    MDM      Extensive differential diagnosis was considered for the patient including pneumonia, meningitis, sepsis, upper respiratory infection, otitis media, peritonsillar abscess and other pathology.    Lung sounds are clear and chest x-ray shows no consolidation therefore I do not suspect pneumonia.  Patient most likely has viral syndrome however given his recent antibiotic use, more watery nature of his diarrhea in the last several days and previous history of C. difficile I recommended we pursue stool testing.  Patient  remains well-hydrated despite symptoms.  Unable to provide stool specimen in the emergency department at this moment so will be sent home with stool collection kit.    Patient will be treated symptomatically and will follow up closely with their primary care physician.  They were educated on symptomatic care and reasons to seek emergency medical care if they were to worsen.  They demonstrated understanding, they are comfortable with the plan and will follow-up as directed.        Disposition and Plan     Clinical Impression:  1. Acute conjunctivitis of right eye, unspecified acute conjunctivitis type    2. Upper respiratory tract infection, unspecified type    3. Diarrhea, unspecified type         Disposition:  Discharge  5/14/2025 10:09 pm    Follow-up:  No follow-up provider specified.        Medications Prescribed:  Current Discharge Medication List        START taking these medications    Details   erythromycin 5 MG/GM Ophthalmic Ointment Apply 1 Application to eye every 6 (six) hours for 7 days.  Qty: 1 g, Refills: 0      benzonatate 100 MG Oral Cap Take 1 capsule (100 mg total) by mouth 3 (three) times daily as needed for cough.  Qty: 30 capsule, Refills: 0             Supplementary Documentation:                                                            Documentation created with the aid of Dragon voice recognition software.  Although efforts were made to ensure the accuracy of the note, some inaccuracies may persist.

## 2025-05-15 NOTE — ED INITIAL ASSESSMENT (HPI)
Pt reports from home with congestion, cough, and diarrhea that started Monday. Pt reports no fever.  Recently diagnosed with \"eye infection\". Recently treated with antibiotics and steroids drops. Finished both prescriptions

## 2025-05-16 LAB — C DIFF TOX B STL QL: NEGATIVE

## 2025-05-28 ENCOUNTER — ANESTHESIA EVENT (OUTPATIENT)
Dept: GASTROENTEROLOGY | Age: 65
End: 2025-05-28

## 2025-05-28 ENCOUNTER — ANESTHESIA (OUTPATIENT)
Dept: GASTROENTEROLOGY | Age: 65
End: 2025-05-28

## 2025-05-28 ENCOUNTER — HOSPITAL ENCOUNTER (OUTPATIENT)
Dept: GASTROENTEROLOGY | Age: 65
Discharge: HOME | End: 2025-05-28
Attending: INTERNAL MEDICINE

## 2025-05-28 VITALS
SYSTOLIC BLOOD PRESSURE: 99 MMHG | BODY MASS INDEX: 31.88 KG/M2 | HEIGHT: 70 IN | HEART RATE: 70 BPM | TEMPERATURE: 97.2 F | WEIGHT: 222.66 LBS | DIASTOLIC BLOOD PRESSURE: 81 MMHG | OXYGEN SATURATION: 96 %

## 2025-05-28 DIAGNOSIS — K21.9 GASTRO-ESOPHAGEAL REFLUX DISEASE WITHOUT ESOPHAGITIS: ICD-10-CM

## 2025-05-28 DIAGNOSIS — K62.5 HEMORRHAGE OF ANUS AND RECTUM: ICD-10-CM

## 2025-05-28 PROCEDURE — 10006023 HB SUPPLY 272

## 2025-05-28 PROCEDURE — 13000008 HB ANESTHESIA MAC OUTSIDE OR

## 2025-05-28 PROCEDURE — 10004451 HB PACU RECOVERY 1ST 30 MINUTES

## 2025-05-28 PROCEDURE — 10002800 HB RX 250 W HCPCS

## 2025-05-28 PROCEDURE — 88305 TISSUE EXAM BY PATHOLOGIST: CPT | Performed by: INTERNAL MEDICINE

## 2025-05-28 PROCEDURE — 13000029 HB GI MAJOR COMPLEX CASE EA ADD MINUTE

## 2025-05-28 PROCEDURE — 10002807 HB RX 258

## 2025-05-28 PROCEDURE — C1889 IMPLANT/INSERT DEVICE, NOC: HCPCS

## 2025-05-28 PROCEDURE — 13000001 HB PHASE II RECOVERY EA 30 MINUTES

## 2025-05-28 PROCEDURE — 13000028 HB GI MAJOR COMPLEX CASE S/U + 1ST 15 MIN

## 2025-05-28 PROCEDURE — C1751 CATH, INF, PER/CENT/MIDLINE: HCPCS

## 2025-05-28 PROCEDURE — 10002801 HB RX 250 W/O HCPCS

## 2025-05-28 PROCEDURE — 10002807 HB RX 258: Performed by: INTERNAL MEDICINE

## 2025-05-28 RX ORDER — SODIUM CHLORIDE 9 MG/ML
INJECTION, SOLUTION INTRAVENOUS CONTINUOUS PRN
Status: DISCONTINUED | OUTPATIENT
Start: 2025-05-28 | End: 2025-05-28

## 2025-05-28 RX ORDER — GLYCOPYRROLATE 0.2 MG/ML
INJECTION, SOLUTION INTRAMUSCULAR; INTRAVENOUS PRN
Status: DISCONTINUED | OUTPATIENT
Start: 2025-05-28 | End: 2025-05-28

## 2025-05-28 RX ORDER — PROPOFOL 10 MG/ML
INJECTION, EMULSION INTRAVENOUS PRN
Status: DISCONTINUED | OUTPATIENT
Start: 2025-05-28 | End: 2025-05-28

## 2025-05-28 RX ORDER — SODIUM CHLORIDE 9 MG/ML
INJECTION, SOLUTION INTRAVENOUS CONTINUOUS
Status: DISCONTINUED | OUTPATIENT
Start: 2025-05-28 | End: 2025-05-30 | Stop reason: HOSPADM

## 2025-05-28 RX ADMIN — PROPOFOL 120 MCG/KG/MIN: 10 INJECTION, EMULSION INTRAVENOUS at 10:55

## 2025-05-28 RX ADMIN — Medication 50 MCG: at 11:09

## 2025-05-28 RX ADMIN — SODIUM CHLORIDE: 9 INJECTION, SOLUTION INTRAVENOUS at 10:53

## 2025-05-28 RX ADMIN — Medication 100 MCG: at 11:12

## 2025-05-28 RX ADMIN — GLYCOPYRROLATE 0.2 MG: 0.2 INJECTION INTRAMUSCULAR; INTRAVENOUS at 10:54

## 2025-05-28 RX ADMIN — PROPOFOL 150 MG: 10 INJECTION, EMULSION INTRAVENOUS at 10:55

## 2025-05-28 RX ADMIN — SODIUM CHLORIDE: 9 INJECTION, SOLUTION INTRAVENOUS at 09:17

## 2025-05-28 ASSESSMENT — ACTIVITIES OF DAILY LIVING (ADL)
ADL_BEFORE_ADMISSION: INDEPENDENT
ADL_SCORE: 12

## 2025-05-28 ASSESSMENT — PAIN SCALES - GENERAL
PAINLEVEL_OUTOF10: 0

## 2025-05-28 ASSESSMENT — PAIN SCALES - WONG BAKER: WONGBAKER_NUMERICALRESPONSE: 0

## 2025-05-28 ASSESSMENT — LIFESTYLE VARIABLES: HOW OFTEN DO YOU HAVE A DRINK CONTAINING ALCOHOL: NEVER

## 2025-05-30 LAB
ASR DISCLAIMER: NORMAL
CASE RPRT: NORMAL
CLINICAL INFO: NORMAL
PATH REPORT.FINAL DX SPEC: NORMAL
PATH REPORT.GROSS SPEC: NORMAL

## 2025-07-02 ENCOUNTER — APPOINTMENT (OUTPATIENT)
Dept: GENERAL RADIOLOGY | Facility: HOSPITAL | Age: 65
End: 2025-07-02
Attending: EMERGENCY MEDICINE
Payer: MEDICARE

## 2025-07-02 ENCOUNTER — APPOINTMENT (OUTPATIENT)
Dept: CT IMAGING | Facility: HOSPITAL | Age: 65
End: 2025-07-02
Attending: EMERGENCY MEDICINE
Payer: MEDICARE

## 2025-07-02 ENCOUNTER — HOSPITAL ENCOUNTER (EMERGENCY)
Facility: HOSPITAL | Age: 65
Discharge: HOME | End: 2025-07-03
Attending: EMERGENCY MEDICINE
Payer: MEDICARE

## 2025-07-02 DIAGNOSIS — R53.83 OTHER FATIGUE: Primary | ICD-10-CM

## 2025-07-02 DIAGNOSIS — R51.9 NONINTRACTABLE HEADACHE, UNSPECIFIED CHRONICITY PATTERN, UNSPECIFIED HEADACHE TYPE: ICD-10-CM

## 2025-07-02 LAB
ALBUMIN SERPL-MCNC: 4.9 G/DL (ref 3.2–4.8)
ALBUMIN/GLOB SERPL: 1.8 {RATIO} (ref 1–2)
ALP LIVER SERPL-CCNC: 75 U/L (ref 45–117)
ALT SERPL-CCNC: 21 U/L (ref 10–49)
ANION GAP SERPL CALC-SCNC: 9 MMOL/L (ref 0–18)
AST SERPL-CCNC: 23 U/L (ref ?–34)
BASOPHILS # BLD AUTO: 0.03 X10(3) UL (ref 0–0.2)
BASOPHILS NFR BLD AUTO: 0.4 %
BILIRUB SERPL-MCNC: 0.6 MG/DL (ref 0.2–1.1)
BILIRUB UR QL STRIP.AUTO: NEGATIVE
BUN BLD-MCNC: 12 MG/DL (ref 9–23)
CALCIUM BLD-MCNC: 10.1 MG/DL (ref 8.7–10.6)
CHLORIDE SERPL-SCNC: 102 MMOL/L (ref 98–112)
CK SERPL-CCNC: 78 U/L (ref 46–171)
CLARITY UR REFRACT.AUTO: CLEAR
CO2 SERPL-SCNC: 29 MMOL/L (ref 21–32)
CREAT BLD-MCNC: 1 MG/DL (ref 0.7–1.3)
EGFRCR SERPLBLD CKD-EPI 2021: 84 ML/MIN/1.73M2 (ref 60–?)
EOSINOPHIL # BLD AUTO: 0.16 X10(3) UL (ref 0–0.7)
EOSINOPHIL NFR BLD AUTO: 2.3 %
ERYTHROCYTE [DISTWIDTH] IN BLOOD BY AUTOMATED COUNT: 14.6 %
GLOBULIN PLAS-MCNC: 2.7 G/DL (ref 2–3.5)
GLUCOSE BLD-MCNC: 120 MG/DL (ref 70–99)
GLUCOSE UR STRIP.AUTO-MCNC: NORMAL MG/DL
HCT VFR BLD AUTO: 50 % (ref 39–53)
HGB BLD-MCNC: 16.4 G/DL (ref 13–17.5)
IMM GRANULOCYTES # BLD AUTO: 0.02 X10(3) UL (ref 0–1)
IMM GRANULOCYTES NFR BLD: 0.3 %
KETONES UR STRIP.AUTO-MCNC: NEGATIVE MG/DL
LEUKOCYTE ESTERASE UR QL STRIP.AUTO: NEGATIVE
LYMPHOCYTES # BLD AUTO: 2.17 X10(3) UL (ref 1–4)
LYMPHOCYTES NFR BLD AUTO: 30.6 %
MCH RBC QN AUTO: 30.2 PG (ref 26–34)
MCHC RBC AUTO-ENTMCNC: 32.8 G/DL (ref 31–37)
MCV RBC AUTO: 92.1 FL (ref 80–100)
MONOCYTES # BLD AUTO: 0.51 X10(3) UL (ref 0.1–1)
MONOCYTES NFR BLD AUTO: 7.2 %
NEUTROPHILS # BLD AUTO: 4.2 X10 (3) UL (ref 1.5–7.7)
NEUTROPHILS # BLD AUTO: 4.2 X10(3) UL (ref 1.5–7.7)
NEUTROPHILS NFR BLD AUTO: 59.2 %
NITRITE UR QL STRIP.AUTO: NEGATIVE
OSMOLALITY SERPL CALC.SUM OF ELEC: 291 MOSM/KG (ref 275–295)
PH UR STRIP.AUTO: 5.5 [PH] (ref 5–8)
PLATELET # BLD AUTO: 226 10(3)UL (ref 150–450)
POTASSIUM SERPL-SCNC: 4.1 MMOL/L (ref 3.5–5.1)
PROT SERPL-MCNC: 7.6 G/DL (ref 5.7–8.2)
PROT UR STRIP.AUTO-MCNC: NEGATIVE MG/DL
RBC # BLD AUTO: 5.43 X10(6)UL (ref 3.8–5.8)
RBC UR QL AUTO: NEGATIVE
SODIUM SERPL-SCNC: 140 MMOL/L (ref 136–145)
SP GR UR STRIP.AUTO: >1.03 (ref 1–1.03)
TROPONIN I SERPL HS-MCNC: <3 NG/L (ref ?–53)
TSI SER-ACNC: 1.82 UIU/ML (ref 0.55–4.78)
UROBILINOGEN UR STRIP.AUTO-MCNC: NORMAL MG/DL
WBC # BLD AUTO: 7.1 X10(3) UL (ref 4–11)

## 2025-07-02 PROCEDURE — 36415 COLL VENOUS BLD VENIPUNCTURE: CPT

## 2025-07-02 PROCEDURE — 70496 CT ANGIOGRAPHY HEAD: CPT | Performed by: EMERGENCY MEDICINE

## 2025-07-02 PROCEDURE — 99285 EMERGENCY DEPT VISIT HI MDM: CPT

## 2025-07-02 PROCEDURE — 93005 ELECTROCARDIOGRAM TRACING: CPT

## 2025-07-02 PROCEDURE — 84484 ASSAY OF TROPONIN QUANT: CPT | Performed by: EMERGENCY MEDICINE

## 2025-07-02 PROCEDURE — 84443 ASSAY THYROID STIM HORMONE: CPT | Performed by: EMERGENCY MEDICINE

## 2025-07-02 PROCEDURE — 85025 COMPLETE CBC W/AUTO DIFF WBC: CPT | Performed by: EMERGENCY MEDICINE

## 2025-07-02 PROCEDURE — 70498 CT ANGIOGRAPHY NECK: CPT | Performed by: EMERGENCY MEDICINE

## 2025-07-02 PROCEDURE — 93010 ELECTROCARDIOGRAM REPORT: CPT

## 2025-07-02 PROCEDURE — 82550 ASSAY OF CK (CPK): CPT | Performed by: EMERGENCY MEDICINE

## 2025-07-02 PROCEDURE — 81003 URINALYSIS AUTO W/O SCOPE: CPT | Performed by: EMERGENCY MEDICINE

## 2025-07-02 PROCEDURE — 99284 EMERGENCY DEPT VISIT MOD MDM: CPT

## 2025-07-02 PROCEDURE — 80053 COMPREHEN METABOLIC PANEL: CPT

## 2025-07-02 PROCEDURE — 71045 X-RAY EXAM CHEST 1 VIEW: CPT | Performed by: EMERGENCY MEDICINE

## 2025-07-02 PROCEDURE — 80053 COMPREHEN METABOLIC PANEL: CPT | Performed by: EMERGENCY MEDICINE

## 2025-07-02 PROCEDURE — 85025 COMPLETE CBC W/AUTO DIFF WBC: CPT

## 2025-07-02 RX ORDER — BUSPIRONE HYDROCHLORIDE 5 MG/1
5 TABLET ORAL 3 TIMES DAILY
COMMUNITY

## 2025-07-02 NOTE — ED INITIAL ASSESSMENT (HPI)
Patient states c/o headache, nausea, diarrhea, fatigue, and leg burn x 2 weeks. Patient states he is unable to get out of bed.

## 2025-07-03 VITALS
TEMPERATURE: 98 F | OXYGEN SATURATION: 97 % | RESPIRATION RATE: 20 BRPM | HEART RATE: 73 BPM | SYSTOLIC BLOOD PRESSURE: 139 MMHG | DIASTOLIC BLOOD PRESSURE: 84 MMHG

## 2025-07-04 LAB
ATRIAL RATE: 70 BPM
P AXIS: 51 DEGREES
P-R INTERVAL: 154 MS
Q-T INTERVAL: 386 MS
QRS DURATION: 96 MS
QTC CALCULATION (BEZET): 416 MS
R AXIS: 21 DEGREES
T AXIS: 21 DEGREES
VENTRICULAR RATE: 70 BPM

## 2025-07-04 NOTE — ED PROVIDER NOTES
Patient Seen in: [facility] Emergency Department        History  Chief Complaint   Patient presents with    Fatigue     Stated Complaint: fatigue, HA, nausea and diarrhea.    Subjective:   HPI          This is a 65-year-old gentleman here for evaluation of multiple complaints.  States for the past 2 to 3 weeks he has just felt drained all the time, no energy, often unable to get out of bed, [place] any falls or injury any chest pain shortness of breath abdominal pain skin changes rash urinary symptoms cough cold symptoms focal weakness or numbness.  States he occasionally gets headaches, does have nausea, occasional nonbloody loose stool, that has improved though.  Patient lives alone states he is , usually gets his food by going out to restaurants which he has been doing denies any other complaints or concerns      Objective:     Past Medical History:    Anxiety    Back problem    Calculus of kidney    Coronary atherosclerosis    Depression    Enlarged prostate    High blood pressure    High cholesterol    NIDIA (obstructive sleep apnea)    Other and unspecified hyperlipidemia    Sleep apnea    Unspecified essential hypertension    Urinary retention    Visual impairment              Past Surgical History:   Procedure Laterality Date    Cath bare metal stent (bms)      Removal of lung,lobectomy      Stent placemt retro carotid Right                 Social History     Socioeconomic History    Marital status:    Tobacco Use    Smoking status: Former    Smokeless tobacco: Never   Vaping Use    Vaping status: Never Used   Substance and Sexual Activity    Alcohol use: Not Currently     Comment: last drink 1030 AM 5/3    Drug use: Never   Other Topics Concern    Caffeine Concern Yes     Comment: iced tea    Exercise Yes     Comment: walking     Social Drivers of Health     Food Insecurity: Low Risk  (5/16/2024)    Received from [facility]    Food Insecurity     Within the past 12 months, you  worried that your food would run out before you got money to buy more.  : Never true     Within the past 12 months, the food you bought just didn't last and you didn't have money to get more. : Never true   Transportation Needs: Not At Risk (5/16/2024)    Received from [facility]    Transportation Needs     In the past 12 months, has lack of reliable transportation kept you from medical appointments, meetings, work or from getting things needed for daily living? : No   Housing Stability: Low Risk  (5/4/2024)    Housing Stability     Housing Instability: No                                Physical Exam    ED Triage Vitals   BP 07/02/25 1827 137/78   Pulse 07/02/25 1826 83   Resp 07/02/25 1826 17   Temp 07/02/25 1826 97.7 °F (36.5 °C)   Temp src 07/02/25 1826 Temporal   SpO2 07/02/25 1826 97 %   O2 Device 07/02/25 1826 None (Room air)       Current Vitals:   Vital Signs  BP: 139/84  Pulse: 73  Resp: 20  MAP (mmHg): (!) 101    Oxygen Therapy  SpO2: 97 %  O2 Device: None (Room air)            Physical Exam      Physical Exam  Vitals signs and nursing note reviewed.   General:  Patient laying supine in the bed in no acute distress  Head: Normocephalic and atraumatic.   HEENT:  Mucous membranes are moist.   Cardiovascular:  Normal rate and regular rhythm.  No Edema  Pulmonary:  Pulmonary effort is normal.  Normal breath sounds. No wheezing, rhonchi or rales.   Abdominal: Soft nontender nondistended, normal bowel sounds, no guarding no rebound tenderness  Skin: Warm and dry  Neurological: Awake alert, speech is normal, motor 5/5 in bilateral upper and lower extremities.  sensation is grossly intact throughout.  No facial asymmetry.  Stable narrow based gait.            ED Course  Labs Reviewed   COMP METABOLIC PANEL (14) - Abnormal; Notable for the following components:       Result Value    Glucose 120 (*)     Albumin 4.9 (*)     All other components within normal limits   URINALYSIS WITH CULTURE REFLEX -  Abnormal; Notable for the following components:    Spec Gravity >1.030 (*)     All other components within normal limits   TROPONIN I HIGH SENSITIVITY - Normal   TSH W REFLEX TO FREE T4 - Normal   CK CREATINE KINASE (NOT CREATININE) - Normal   CBC WITH DIFFERENTIAL WITH PLATELET   RAINBOW DRAW BLUE     EKG    Rate, intervals and axes as noted on EKG Report.  Rate: 70  Rhythm: Sinus Rhythm  Reading: No acute ischemic changes           XR CHEST AP PORTABLE  (CPT=71045)  Result Date: 7/2/2025  PROCEDURE: XR CHEST AP PORTABLE  (CPT=71045) INDICATIONS: fatigue, HA, nausea and diarrhea. COMPARISON: 5/14/2025. FINDINGS: Lung volumes are satisfactory. Minimal scarring at the left base is similar to the prior. No new consolidation. CP angles remain sharp. Heart and pulmonary vessels appear stable, normal caliber. Smooth mediastinal contours.     CONCLUSION: No evidence of active cardiopulmonary disease. Electronically Verified and Signed by Attending Radiologist: Ronaldo Thorpe MD 7/2/2025 10:35 PM Workstation: [id number]    CTA BRAIN + CTA CAROTIDS (CPT=70496/41197)  Result Date: 7/2/2025  PROCEDURE: CTA BRAIN + CTA CAROTIDS (CPT=70496/39920) INDICATIONS: headache, gen weakness, prior L carotid stent COMPARISON: 1/12/2023 CTA brain and CTA carotids. 4/26/2025 CT head. TECHNIQUE: CT angiography of the brain and neck vasculature using non-ionic contrast was performed. 3D volume renderings are performed by the radiologist on an independent workstation and interpreted to optimize visualization of vascular anatomy.  All measurements obtained in this exam were performed using NASCET criteria. Dose reduction techniques were used. Dose information is transmitted to the ACR (American College of Radiology) NRDR (National Radiology Data Registry) which includes the Dose Index  Registry. CONTRAST USED: IOPAMIDOL 76% IV SOLN FOR POWER INJECTOR:75 mL FINDINGS: Ventricles and sulci are diffusely prominent in caliber, stable. Allowing for age  this is considered a moderate generalized atrophy. No new mass effect or midline shift. No acute intracranial hemorrhage. White matter low-attenuation is similar to the prior consistent with chronic small vessel disease. A focal remote infarct at the left caudate nucleus head is similar to the prior. No enhancing mass lesion. The visualized paranasal sinuses and mastoid air cells are satisfactorily aerated. The thoracic aorta is partially imaged. There is tortuosity and ectasia. No dissection. Typical takeoff pattern of the great vessels from the transverse arch. Mild atherosclerotic changes are seen at the origins. There is great vessel tortuosity. Interval stenting of the proximal right ICA. The stent is patent. There is no measurable stenosis at either bifurcation. External carotid arteries are patent. There is tortuosity of the distal ICA segments bilaterally, similar to the prior. Short segment very distal right vertebral artery occlusion, similar to the prior. The remainder of the vertebral basilar artery segments are patent, and appears similar to the prior. Patent anterior, middle and posterior cerebral arteries multifocal/beaded appearance to the proximal middle cerebral arteries and proximal posterior cerebral arteries appears similar to the prior. No intracranial aneurysm or arteriovenous malformation. Patent dural sinuses.     CONCLUSION: 1. No acute intracranial abnormality. 2. Interval right carotid stenting. The stent is patent. 3. Short segment right distal vertebral artery occlusion is similar to the prior. 4. Multifocal stenoses/beaded appearance of the proximal MCA and PCA segments appears similar to the prior. 5. Details as above. Continued clinical correlation recommended. Electronically Verified and Signed by Attending Radiologist: Ronaldo Thorpe MD 7/2/2025 10:18 PM Workstation: [id number]                      [facility]  65-year-old gentleman here for evaluation of generalized fatigue over the past few  weeks, nonspecific symptoms.  Differential includes thyroid dysfunction electrolyte abnormality ACS.  Also consider depression.  CT of the head and neck was obtained as patient has history of previous carotid stenting it showed no acute abnormality patient's electrolytes are unremarkable, EKG no acute ischemic changes troponin is negative urinalysis without evidence of infection and chest x-ray unremarkable.  Spoke at length with the patient about his symptoms, states mood is low at times, denies SI or HI.  States he will follow-up with his primary doctor for further evaluation declined any other resources at this time return precautions discussed he is in agreement with plan.        Medical Decision Making      Disposition and Plan     Clinical Impression:  1. Other fatigue    2. Nonintractable headache, unspecified chronicity pattern, unspecified headache type         Disposition:  Discharge  7/3/2025  2:02 am    Follow-up:  Mesfin Millan MD  [address] [address]  [phone]    Call in 1 day(s)  Follow-up with your PMD for reevaluation in 24 to 48 hours.  Return to ER if symptoms worsen or change or if any other new concerns.    Follow-up with your neurologist as well.          Medications Prescribed:  Discharge Medication List as of 7/3/2025  2:02 AM                Supplementary Documentation:

## 2025-07-23 DIAGNOSIS — E78.2 MIXED HYPERLIPIDEMIA: Primary | ICD-10-CM

## (undated) DEVICE — BLADE SURG 11 UNFRM SHARPNESS STRL SS

## (undated) DEVICE — BLANKET WRM UNDERBODY ADULT 221X91IN 24X48IN BR HGR PLMR 7OZ

## (undated) DEVICE — PAD EG 15SQ IN UNV FOAM SPLIT PREATTACH CORD ADULT 9100

## (undated) DEVICE — GLOVE SURG 7.5 PROTEXIS LF CRM PF SMTH BEAD CUFF STRL

## (undated) DEVICE — GOWN SURG LG L3 NONREINFORCE SET IN SLV STRL LF DISP BLUE

## (undated) DEVICE — SUTURE PROLENE 5-0 C-1 18IN 2 ARM MONO NABSB BLUE 8717H

## (undated) DEVICE — KIT INTVN NEUROPROTECTION ENROUTE TRANSCAROTID

## (undated) DEVICE — GOWN SURG XL L4 RAGLAN SLV BRTHBL STRL LF DISP SMARTGOWN

## (undated) DEVICE — KIT INTRO 4FR 50CM 15CM .018IN MICROWIRE XTN TUBE NDL STIFF

## (undated) DEVICE — BLADE SURG 10 INDIV FOIL WRAP STD SCPL HNDL STRL PRSNA + SS

## (undated) DEVICE — LOOP VSL MAXI 18IN RED LF 2 CARD MDCHC ELSTMR STRL

## (undated) DEVICE — GOWN SURG XL L4 IMPRV REINFORCE SET IN SLV STRL LF DISP BLUE

## (undated) DEVICE — GLOVE SURG 5.5 PROTEXIS LF CRM PF BEAD CUFF STRL PLISPRN

## (undated) DEVICE — ADHESIVE PREMIERPRO EXOFIN 1ML HVISC TUBE SOFT FLXB APL

## (undated) DEVICE — GUIDEWIRE 150CM 3MM RDS J CRV .035IN VASC PTFE FX CORE LF

## (undated) DEVICE — SHEET TRNSF 1960X760MM 480MM MAXITUBE FLITES PRM TUBE LF

## (undated) DEVICE — Device

## (undated) DEVICE — TOWEL SURG 26X15IN BLUE TIBURON NABSB DRAPE ADH STRIP STRL

## (undated) DEVICE — COVER INST SUTBT ADH BACK CRTDG RADOPQ DISP YLW STRL LF

## (undated) DEVICE — KIT MICROINTRODUCER 4FR .018IN 40CM 7CM .018IN SFTP MNDRL

## (undated) DEVICE — COVER PROBE FLX-FEEL 58X6IN OR 4 FLAG 2 SHT 24 PRINT STRL LF

## (undated) DEVICE — HANDPIECE SCT MDVC FLX-CLR HI CAPACITY FLXB CLR STRL LF DISP

## (undated) DEVICE — SUTURE PRMHND 2-0 30IN SILK BRAID TIES 10 STRN

## (undated) DEVICE — GUIDEWIRE ENROUTE 95CM .014IN VASC ERG CNTRL

## (undated) DEVICE — GLOVE SURG 8 PREMIERPRO LF NATURAL PF TXTR SMTH STRL

## (undated) DEVICE — SUTURE VICRYL MTPS 4-0 PS2 27IN BRAID COAT ABS UNDYED J426H

## (undated) DEVICE — GLOVE SURG 6 PREMIERPRO LF NATURAL PF TXTR SMTH STRL

## (undated) DEVICE — ELECTRODE PT RTN C30- LB 9FT CORD NONIRRITATE NONSENSITIZE

## (undated) DEVICE — DRAPE 2 INCS FILM ANTIMICROBIAL 33X23IN SURG IOBAN STRL

## (undated) DEVICE — HEMOSTAT ABS 8X4IN FLXB SHEER WEAVE SURGICEL STRL DISP

## (undated) DEVICE — DRAPE 2 INCS FILM ANTIMICROBIAL 23X17IN SURG IOBAN STRL

## (undated) DEVICE — BOOT SUT .437IN .062IN STD FOAM AID RADOPQ PAD ADH BCK VASC

## (undated) DEVICE — DEVICE GTWY ENCORE ADV 20ML KIT TRQ GW INTRO INFL 20MM 3MM

## (undated) DEVICE — SUTURE VICRYL 3-0 SH 27IN BRAID COAT ABS UNDYED J416H

## (undated) DEVICE — 2% CHLORHEXIDINE SKIN PREP ORANGE 26ML

## (undated) DEVICE — COVER EQUIPMENT SNAP KAPS 22IN DOME RND LF

## (undated) DEVICE — ELECTRODE DFBR UNV 15.2X10.8CM ADH PAD RDTRNS POUCH PADPRO

## (undated) DEVICE — SUTURE PRMHND SUTUPAK 3-0 30IN SILK BRAID TIES 10

## (undated) DEVICE — GLOVE SURG 8 PROTEXIS LF CRM PF BEAD CUFF STRL PLISPRN 12IN

## (undated) DEVICE — SHEATH 6FR 10CM 2.5CM .035IN INTRO SNAP ON DIL LOCK KINK RST

## (undated) DEVICE — SPONGE SURG 4X4IN CTN 16 PLY XRY DTCT STRL LF DISP

## (undated) DEVICE — SYRINGE 30ML CONC TIP GRAD N-PYRG DEHP-FR STRL MED LF DISP

## (undated) DEVICE — SUTURE PRMHND 2-0 PS 18IN SILK BRAID NABSB BLK 1588H

## (undated) DEVICE — GLOVE SURG 6.5 PROTEXIS LF CRM PF BEAD CUFF STRL PLISPRN

## (undated) DEVICE — BLADE SURG 15 STRL PRSNA + PLMR

## (undated) DEVICE — GLOVE SURG 7 PROTEXIS LF CRM PF BEAD CUFF STRL PLISPRN 12IN

## (undated) NOTE — ED AVS SNAPSHOT
Ambrose Ribera   MRN: [id number]    Department:  [facility] Emergency Department   Date of Visit:  3/3/2018           Disclosure     Insurance plans vary and the physician(s) referred by the ER may not be covered by your plan.  Please contact your i tell this physician (or your personal doctor if your instructions are to return to your personal doctor) about any new or lasting problems. The primary care or specialist physician will see patients referred from the [facility] Emergency Department.  Yocasta Duque

## (undated) NOTE — ED AVS SNAPSHOT
Pamela Salazar   MRN: [id number]    Department:  [facility] Emergency Department   Date of Visit:  1/19/2018           Disclosure     Insurance plans vary and the physician(s) referred by the ER may not be covered by your plan.  Please contact your tell this physician (or your personal doctor if your instructions are to return to your personal doctor) about any new or lasting problems. The primary care or specialist physician will see patients referred from the [facility] Emergency Department.  Yaya Hopper

## (undated) NOTE — ED AVS SNAPSHOT
Evan Modi   MRN: [id number]    Department:  [facility] Emergency Department   Date of Visit:  4/30/2019           Disclosure     Insurance plans vary and the physician(s) referred by the ER may not be covered by your plan.  Please contact your tell this physician (or your personal doctor if your instructions are to return to your personal doctor) about any new or lasting problems. The primary care or specialist physician will see patients referred from the [facility] Emergency Department.  Anika Li

## (undated) NOTE — LETTER
Patient Name: Mildred Vyas        : 1960       Medical Record #: [id number]    CONSENT FOR PROCEDURES/SEDATION    Date: 2018       Time: 6:09 PM        1.  I authorize the performance upon Mildred Vyas the following:    _____small bore Chest